# Patient Record
Sex: FEMALE | Race: WHITE | Employment: OTHER | URBAN - METROPOLITAN AREA
[De-identification: names, ages, dates, MRNs, and addresses within clinical notes are randomized per-mention and may not be internally consistent; named-entity substitution may affect disease eponyms.]

---

## 2017-04-10 ENCOUNTER — HOSPITAL ENCOUNTER (OUTPATIENT)
Dept: MAMMOGRAPHY | Age: 72
Discharge: HOME | End: 2017-04-10
Attending: INTERNAL MEDICINE
Payer: MEDICARE

## 2017-04-10 VITALS — SYSTOLIC BLOOD PRESSURE: 143 MMHG | DIASTOLIC BLOOD PRESSURE: 76 MMHG | TEMPERATURE: 96.2 F | HEART RATE: 57 BPM

## 2017-04-10 DIAGNOSIS — N63.20 LEFT BREAST MASS: ICD-10-CM

## 2017-04-10 DIAGNOSIS — N63.20 BREAST MASS, LEFT: ICD-10-CM

## 2017-04-10 PROCEDURE — 77065 DX MAMMO INCL CAD UNI: CPT

## 2017-04-10 PROCEDURE — 19083 BX BREAST 1ST LESION US IMAG: CPT

## 2017-04-10 PROCEDURE — 88361 TUMOR IMMUNOHISTOCHEM/COMPUT: CPT

## 2017-04-10 PROCEDURE — 73060999999 HC MISC LAB CHARGE

## 2017-04-10 PROCEDURE — 88305 TISSUE EXAM BY PATHOLOGIST: CPT | Performed by: INTERNAL MEDICINE

## 2017-04-10 PROCEDURE — 74011000250 HC RX REV CODE- 250: Performed by: INTERNAL MEDICINE

## 2017-04-10 RX ORDER — LIDOCAINE HYDROCHLORIDE 10 MG/ML
5 INJECTION INFILTRATION; PERINEURAL
Status: COMPLETED | OUTPATIENT
Start: 2017-04-10 | End: 2017-04-10

## 2017-04-10 RX ADMIN — LIDOCAINE HYDROCHLORIDE 5 ML: 10 INJECTION, SOLUTION INFILTRATION; PERINEURAL at 10:43

## 2017-04-12 NOTE — PROGRESS NOTES
Dr Krishna Craft and I spoke with Ms Barbara Barba and her  regarding the results of her Lt breast U/S bx. 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 They did very well with results, had few questions and she had no post bx issues or complaints. I prayed with them and gave them an education packet.  MRI is 4/19 DT @ 10:15 then meet with [facility] 8/08 @ 9:15

## 2017-04-19 ENCOUNTER — HOSPITAL ENCOUNTER (OUTPATIENT)
Dept: MRI IMAGING | Age: 72
Discharge: HOME | End: 2017-04-19
Attending: INTERNAL MEDICINE
Payer: MEDICARE

## 2017-04-19 DIAGNOSIS — C50.912 BREAST CANCER, LEFT BREAST (HCC): ICD-10-CM

## 2017-04-19 LAB — CREAT BLD-MCNC: 1 MG/DL (ref 0.6–1)

## 2017-04-19 PROCEDURE — 74011250636 HC RX REV CODE- 250/636: Performed by: INTERNAL MEDICINE

## 2017-04-19 PROCEDURE — A9577 INJ MULTIHANCE: HCPCS | Performed by: INTERNAL MEDICINE

## 2017-04-19 PROCEDURE — 74011000258 HC RX REV CODE- 258: Performed by: INTERNAL MEDICINE

## 2017-04-19 PROCEDURE — 77059 MRI BREAST BI W WO CONT: CPT

## 2017-04-19 PROCEDURE — 82565 ASSAY OF CREATININE: CPT

## 2017-04-19 RX ORDER — SODIUM CHLORIDE 0.9 % (FLUSH) 0.9 %
10 SYRINGE (ML) INJECTION
Status: COMPLETED | OUTPATIENT
Start: 2017-04-19 | End: 2017-04-19

## 2017-04-19 RX ADMIN — GADOBENATE DIMEGLUMINE 19 ML: 529 INJECTION, SOLUTION INTRAVENOUS at 11:29

## 2017-04-19 RX ADMIN — Medication 10 ML: at 11:29

## 2017-04-19 RX ADMIN — SODIUM CHLORIDE 100 ML: 900 INJECTION, SOLUTION INTRAVENOUS at 11:29

## 2017-04-25 PROBLEM — C50.912 BREAST CANCER, LEFT BREAST (HCC): Status: ACTIVE | Noted: 2017-04-25

## 2017-04-25 RX ORDER — CEFAZOLIN SODIUM IN 0.9 % NACL 2 G/50 ML
2 INTRAVENOUS SOLUTION, PIGGYBACK (ML) INTRAVENOUS ONCE
Status: CANCELLED | OUTPATIENT
Start: 2017-05-05 | End: 2017-05-05

## 2017-04-26 ENCOUNTER — PATIENT OUTREACH (OUTPATIENT)
Dept: CASE MANAGEMENT | Age: 72
End: 2017-04-26

## 2017-04-26 NOTE — ACP (ADVANCE CARE PLANNING)
New patient visit with Dr. Eriberto Ward. Patient wants to have lumpectomy and discussed wire localization lumpectomy with sentinel  lymph node biopsy. Once surgery is completed the patient will  return for path report about one week after surgery and then will be referred to medical oncology. Surgery will be at the [address] and it is outpatient.   To return post op.

## 2017-05-01 ENCOUNTER — HOSPITAL ENCOUNTER (OUTPATIENT)
Dept: SURGERY | Age: 72
Discharge: HOME | End: 2017-05-01
Attending: SURGERY

## 2017-05-01 VITALS
WEIGHT: 190 LBS | HEIGHT: 64 IN | RESPIRATION RATE: 16 BRPM | OXYGEN SATURATION: 100 % | BODY MASS INDEX: 32.44 KG/M2 | SYSTOLIC BLOOD PRESSURE: 134 MMHG | DIASTOLIC BLOOD PRESSURE: 83 MMHG | HEART RATE: 66 BPM

## 2017-05-01 NOTE — PERIOP NOTES
Patient verified name, , and surgery as listed in [facility]. Type 1b surgery, Walk in assessment complete. Labs per surgeon: none  Labs per anesthesia protocol: none  EKG: not needed per protocols. Hibiclens and instructions given per hospital policy. Patient provided with handouts including Guide to Surgery, Pain Management, Hand Hygiene, Blood Transfusion Education, and [facility] Brochure. Patient answered medical/surgical history questions at their best of ability. All prior to admission medications documented in [facility]. Original medication prescription bottle were visualized during patient appointment. Patient instructed to hold all vitamins 7 days prior to surgery and NSAIDS 5 days prior to surgery, patient verbalized understanding. Medications to be held - none    Patient instructed to continue previous medications as prescribed prior to surgery and to take the following medications the day of surgery according to anesthesia guidelines with a small sip of water: tenoretic, zyrtec prn, ativan prn, effexor. Patient taught back and verbalized understanding.

## 2017-05-04 ENCOUNTER — ANESTHESIA EVENT (OUTPATIENT)
Dept: SURGERY | Age: 72
End: 2017-05-04
Payer: MEDICARE

## 2017-05-05 ENCOUNTER — APPOINTMENT (OUTPATIENT)
Dept: NUCLEAR MEDICINE | Age: 72
End: 2017-05-05
Attending: SURGERY
Payer: MEDICARE

## 2017-05-05 ENCOUNTER — APPOINTMENT (OUTPATIENT)
Dept: MAMMOGRAPHY | Age: 72
End: 2017-05-05
Attending: SURGERY
Payer: MEDICARE

## 2017-05-05 ENCOUNTER — ANESTHESIA (OUTPATIENT)
Dept: SURGERY | Age: 72
End: 2017-05-05
Payer: MEDICARE

## 2017-05-05 ENCOUNTER — HOSPITAL ENCOUNTER (OUTPATIENT)
Age: 72
Setting detail: OUTPATIENT SURGERY
Discharge: HOME | End: 2017-05-05
Attending: SURGERY | Admitting: SURGERY
Payer: MEDICARE

## 2017-05-05 VITALS
WEIGHT: 190 LBS | HEIGHT: 64 IN | TEMPERATURE: 97.9 F | BODY MASS INDEX: 32.44 KG/M2 | DIASTOLIC BLOOD PRESSURE: 70 MMHG | OXYGEN SATURATION: 95 % | SYSTOLIC BLOOD PRESSURE: 152 MMHG | HEART RATE: 73 BPM | RESPIRATION RATE: 14 BRPM

## 2017-05-05 DIAGNOSIS — C50.919 BREAST CANCER (HCC): ICD-10-CM

## 2017-05-05 DIAGNOSIS — C50.912 CARCINOMA OF LEFT BREAST (HCC): ICD-10-CM

## 2017-05-05 DIAGNOSIS — N63.20 MASS OF LEFT BREAST: ICD-10-CM

## 2017-05-05 PROCEDURE — 76210000016 HC OR PH I REC 1 TO 1.5 HR: Performed by: SURGERY

## 2017-05-05 PROCEDURE — 77030018836 HC SOL IRR NACL ICUM -A: Performed by: SURGERY

## 2017-05-05 PROCEDURE — 77030003460 US PLC NDL/WIRE/CLIP/SEED BREAST LT

## 2017-05-05 PROCEDURE — 76010000161 HC OR TIME 1 TO 1.5 HR INTENSV-TIER 1: Performed by: SURGERY

## 2017-05-05 PROCEDURE — 77030032490 HC SLV COMPR SCD KNE COVD -B: Performed by: SURGERY

## 2017-05-05 PROCEDURE — 74011250636 HC RX REV CODE- 250/636: Performed by: ANESTHESIOLOGY

## 2017-05-05 PROCEDURE — A9541 TC99M SULFUR COLLOID: HCPCS

## 2017-05-05 PROCEDURE — 77030010938 HC CLP LIG TELE -A: Performed by: SURGERY

## 2017-05-05 PROCEDURE — 77030003028 HC SUT VCRL J&J -A: Performed by: SURGERY

## 2017-05-05 PROCEDURE — 74011000250 HC RX REV CODE- 250: Performed by: ANESTHESIOLOGY

## 2017-05-05 PROCEDURE — 74011250636 HC RX REV CODE- 250/636

## 2017-05-05 PROCEDURE — 77030002933 HC SUT MCRYL J&J -A: Performed by: SURGERY

## 2017-05-05 PROCEDURE — 77030031139 HC SUT VCRL2 J&J -A: Performed by: SURGERY

## 2017-05-05 PROCEDURE — 77030020782 HC GWN BAIR PAWS FLX 3M -B: Performed by: ANESTHESIOLOGY

## 2017-05-05 PROCEDURE — 77030020143 HC AIRWY LARYN INTUB CGAS -A: Performed by: ANESTHESIOLOGY

## 2017-05-05 PROCEDURE — 88305 TISSUE EXAM BY PATHOLOGIST: CPT | Performed by: SURGERY

## 2017-05-05 PROCEDURE — 74011250636 HC RX REV CODE- 250/636: Performed by: SURGERY

## 2017-05-05 PROCEDURE — 76210000020 HC REC RM PH II FIRST 0.5 HR: Performed by: SURGERY

## 2017-05-05 PROCEDURE — 76060000033 HC ANESTHESIA 1 TO 1.5 HR: Performed by: SURGERY

## 2017-05-05 PROCEDURE — 77065 DX MAMMO INCL CAD UNI: CPT

## 2017-05-05 PROCEDURE — 74011000250 HC RX REV CODE- 250: Performed by: SURGERY

## 2017-05-05 PROCEDURE — 77030002996 HC SUT SLK J&J -A: Performed by: SURGERY

## 2017-05-05 PROCEDURE — 74011000250 HC RX REV CODE- 250

## 2017-05-05 PROCEDURE — 77030011640 HC PAD GRND REM COVD -A: Performed by: SURGERY

## 2017-05-05 RX ORDER — MIDAZOLAM HYDROCHLORIDE 1 MG/ML
2 INJECTION, SOLUTION INTRAMUSCULAR; INTRAVENOUS
Status: DISCONTINUED | OUTPATIENT
Start: 2017-05-05 | End: 2017-05-05 | Stop reason: HOSPADM

## 2017-05-05 RX ORDER — OXYCODONE HYDROCHLORIDE 5 MG/1
10 TABLET ORAL
Status: DISCONTINUED | OUTPATIENT
Start: 2017-05-05 | End: 2017-05-05 | Stop reason: HOSPADM

## 2017-05-05 RX ORDER — ONDANSETRON 2 MG/ML
4 INJECTION INTRAMUSCULAR; INTRAVENOUS ONCE
Status: DISCONTINUED | OUTPATIENT
Start: 2017-05-05 | End: 2017-05-05 | Stop reason: HOSPADM

## 2017-05-05 RX ORDER — LIDOCAINE HYDROCHLORIDE 10 MG/ML
5 INJECTION INFILTRATION; PERINEURAL
Status: COMPLETED | OUTPATIENT
Start: 2017-05-05 | End: 2017-05-05

## 2017-05-05 RX ORDER — HYDROMORPHONE HYDROCHLORIDE 2 MG/ML
0.5 INJECTION, SOLUTION INTRAMUSCULAR; INTRAVENOUS; SUBCUTANEOUS
Status: DISCONTINUED | OUTPATIENT
Start: 2017-05-05 | End: 2017-05-05 | Stop reason: HOSPADM

## 2017-05-05 RX ORDER — PROMETHAZINE HYDROCHLORIDE 25 MG/1
25 TABLET ORAL
Qty: 20 TAB | Refills: 0 | Status: SHIPPED | OUTPATIENT
Start: 2017-05-05 | End: 2017-09-14 | Stop reason: ALTCHOICE

## 2017-05-05 RX ORDER — PROPOFOL 10 MG/ML
INJECTION, EMULSION INTRAVENOUS AS NEEDED
Status: DISCONTINUED | OUTPATIENT
Start: 2017-05-05 | End: 2017-05-05 | Stop reason: HOSPADM

## 2017-05-05 RX ORDER — LIDOCAINE HYDROCHLORIDE 10 MG/ML
0.1 INJECTION INFILTRATION; PERINEURAL AS NEEDED
Status: DISCONTINUED | OUTPATIENT
Start: 2017-05-05 | End: 2017-05-05 | Stop reason: HOSPADM

## 2017-05-05 RX ORDER — FENTANYL CITRATE 50 UG/ML
100 INJECTION, SOLUTION INTRAMUSCULAR; INTRAVENOUS ONCE
Status: DISCONTINUED | OUTPATIENT
Start: 2017-05-05 | End: 2017-05-05 | Stop reason: HOSPADM

## 2017-05-05 RX ORDER — LIDOCAINE HYDROCHLORIDE 20 MG/ML
INJECTION, SOLUTION EPIDURAL; INFILTRATION; INTRACAUDAL; PERINEURAL AS NEEDED
Status: DISCONTINUED | OUTPATIENT
Start: 2017-05-05 | End: 2017-05-05 | Stop reason: HOSPADM

## 2017-05-05 RX ORDER — ALBUTEROL SULFATE 0.83 MG/ML
2.5 SOLUTION RESPIRATORY (INHALATION) AS NEEDED
Status: DISCONTINUED | OUTPATIENT
Start: 2017-05-05 | End: 2017-05-05 | Stop reason: HOSPADM

## 2017-05-05 RX ORDER — MIDAZOLAM HYDROCHLORIDE 1 MG/ML
2 INJECTION, SOLUTION INTRAMUSCULAR; INTRAVENOUS ONCE
Status: DISCONTINUED | OUTPATIENT
Start: 2017-05-05 | End: 2017-05-05 | Stop reason: HOSPADM

## 2017-05-05 RX ORDER — NALOXONE HYDROCHLORIDE 0.4 MG/ML
0.1 INJECTION, SOLUTION INTRAMUSCULAR; INTRAVENOUS; SUBCUTANEOUS AS NEEDED
Status: DISCONTINUED | OUTPATIENT
Start: 2017-05-05 | End: 2017-05-05 | Stop reason: HOSPADM

## 2017-05-05 RX ORDER — SODIUM CHLORIDE, SODIUM LACTATE, POTASSIUM CHLORIDE, CALCIUM CHLORIDE 600; 310; 30; 20 MG/100ML; MG/100ML; MG/100ML; MG/100ML
100 INJECTION, SOLUTION INTRAVENOUS CONTINUOUS
Status: DISCONTINUED | OUTPATIENT
Start: 2017-05-05 | End: 2017-05-05 | Stop reason: HOSPADM

## 2017-05-05 RX ORDER — DEXAMETHASONE SODIUM PHOSPHATE 4 MG/ML
INJECTION, SOLUTION INTRA-ARTICULAR; INTRALESIONAL; INTRAMUSCULAR; INTRAVENOUS; SOFT TISSUE AS NEEDED
Status: DISCONTINUED | OUTPATIENT
Start: 2017-05-05 | End: 2017-05-05 | Stop reason: HOSPADM

## 2017-05-05 RX ORDER — CEFAZOLIN SODIUM IN 0.9 % NACL 2 G/50 ML
2 INTRAVENOUS SOLUTION, PIGGYBACK (ML) INTRAVENOUS ONCE
Status: COMPLETED | OUTPATIENT
Start: 2017-05-05 | End: 2017-05-05

## 2017-05-05 RX ORDER — HYDROCODONE BITARTRATE AND ACETAMINOPHEN 7.5; 325 MG/1; MG/1
1 TABLET ORAL
Qty: 30 TAB | Refills: 0 | Status: SHIPPED | OUTPATIENT
Start: 2017-05-05 | End: 2017-07-03 | Stop reason: ALTCHOICE

## 2017-05-05 RX ORDER — DIPHENHYDRAMINE HYDROCHLORIDE 50 MG/ML
12.5 INJECTION, SOLUTION INTRAMUSCULAR; INTRAVENOUS
Status: DISCONTINUED | OUTPATIENT
Start: 2017-05-05 | End: 2017-05-05 | Stop reason: HOSPADM

## 2017-05-05 RX ORDER — ONDANSETRON 2 MG/ML
INJECTION INTRAMUSCULAR; INTRAVENOUS AS NEEDED
Status: DISCONTINUED | OUTPATIENT
Start: 2017-05-05 | End: 2017-05-05 | Stop reason: HOSPADM

## 2017-05-05 RX ORDER — OXYCODONE HYDROCHLORIDE 5 MG/1
5 TABLET ORAL
Status: DISCONTINUED | OUTPATIENT
Start: 2017-05-05 | End: 2017-05-05 | Stop reason: HOSPADM

## 2017-05-05 RX ORDER — FENTANYL CITRATE 50 UG/ML
INJECTION, SOLUTION INTRAMUSCULAR; INTRAVENOUS AS NEEDED
Status: DISCONTINUED | OUTPATIENT
Start: 2017-05-05 | End: 2017-05-05 | Stop reason: HOSPADM

## 2017-05-05 RX ORDER — LIDOCAINE HYDROCHLORIDE 10 MG/ML
INJECTION INFILTRATION; PERINEURAL AS NEEDED
Status: DISCONTINUED | OUTPATIENT
Start: 2017-05-05 | End: 2017-05-05 | Stop reason: HOSPADM

## 2017-05-05 RX ADMIN — FENTANYL CITRATE 100 MCG: 50 INJECTION, SOLUTION INTRAMUSCULAR; INTRAVENOUS at 15:29

## 2017-05-05 RX ADMIN — SODIUM CHLORIDE, SODIUM LACTATE, POTASSIUM CHLORIDE, AND CALCIUM CHLORIDE 100 ML/HR: 600; 310; 30; 20 INJECTION, SOLUTION INTRAVENOUS at 08:50

## 2017-05-05 RX ADMIN — LIDOCAINE HYDROCHLORIDE 5 ML: 10 INJECTION, SOLUTION INFILTRATION; PERINEURAL at 11:44

## 2017-05-05 RX ADMIN — ONDANSETRON 4 MG: 2 INJECTION INTRAMUSCULAR; INTRAVENOUS at 15:41

## 2017-05-05 RX ADMIN — PROPOFOL 170 MG: 10 INJECTION, EMULSION INTRAVENOUS at 15:31

## 2017-05-05 RX ADMIN — CEFAZOLIN 2 G: 1 INJECTION, POWDER, FOR SOLUTION INTRAMUSCULAR; INTRAVENOUS; PARENTERAL at 15:26

## 2017-05-05 RX ADMIN — SODIUM CHLORIDE, SODIUM LACTATE, POTASSIUM CHLORIDE, AND CALCIUM CHLORIDE: 600; 310; 30; 20 INJECTION, SOLUTION INTRAVENOUS at 15:49

## 2017-05-05 RX ADMIN — DEXAMETHASONE SODIUM PHOSPHATE 10 MG: 4 INJECTION, SOLUTION INTRA-ARTICULAR; INTRALESIONAL; INTRAMUSCULAR; INTRAVENOUS; SOFT TISSUE at 15:41

## 2017-05-05 RX ADMIN — LIDOCAINE HYDROCHLORIDE 100 MG: 20 INJECTION, SOLUTION EPIDURAL; INFILTRATION; INTRACAUDAL; PERINEURAL at 15:31

## 2017-05-05 RX ADMIN — LIDOCAINE HYDROCHLORIDE 0.1 ML: 10 INJECTION, SOLUTION INFILTRATION; PERINEURAL at 08:50

## 2017-05-05 NOTE — BRIEF OP NOTE
BRIEF OPERATIVE NOTE    Date of Procedure: 5/5/2017   Preoperative Diagnosis: Malignant neoplasm of left female breast, unspecified site of breast ([facility] [C50.912]  Postoperative Diagnosis: Malignant neoplasm of left female breast, unspecified site of breast ([facility] [C50.912]    Procedure(s):  LEFT BREAST NEEDLE LOCALIZED LUMPECTOMY  LEFT SENTINEL NODE BIOPSY WITH LYMPHATIC MAPPING   Surgeon(s) and Role:     * Shayla Woods MD - Primary         Assistant Staff:       Surgical Staff:  Circ-1: Ian Tee RN  Scrub Tech-1: Silvina Marin  Scrub Tech-2: Jaki Salas  Event Time In   Incision Start 1546   Incision Close 1632     Anesthesia: General   Estimated Blood Loss: 20ml  Specimens:   ID Type Source Tests Collected by Time Destination   1 : LEFT BREAST TISSUE Needle Loc   Shayla Woods MD 9/3/6582 5456 Pathology   2 : LEFT SENTINEL NODE #1 AND #2 Preservative   Shayla Woods MD 4/4/5193 1225 Pathology   3 : LEFT SENTINEL NODE #3 Preservative   Shayla Woods MD 3/7/6290 4676 Pathology      Findings: as above    Complications: none  Implants: * No implants in log *

## 2017-05-05 NOTE — DISCHARGE INSTRUCTIONS
May shower on Sunday. Breast Biopsy: What to Expect at Home    Your Recovery  After a sentinel node biopsy, many women have no side effects. Some women have pain or bruising at the cut (incision) and feel tired. Your breast and underarm area may be slightly swollen. This may last a few days. You should feel close to normal in a few days. The incision the doctor made usually heals in about 2 weeks. The scar usually fades with time. Some women have a buildup of fluid in the area where the lymph nodes were removed. This is known as seroma. This goes away on its own, or your doctor can drain it. Your doctor injected blue dye and radioactive material into your breast. The blue dye may give your breast a bluish color and turn your urine green for about 24 hours. The radioactive material leaves the body on its own in 24 to 48 hours. A sentinel node biopsy may be done at the same time as other breast surgeries. If this is the case, how you recover will be different. This care sheet gives you a general idea about how long it will take for you to recover. But each person recovers at a different pace. Follow the steps below to get better as quickly as possible. How can you care for yourself at home? Activity  · Rest when you feel tired. Getting enough sleep will help you recover. · Try to walk each day. Start by walking a little more than you did the day before. Bit by bit, increase the amount you walk. Walking boosts blood flow and helps prevent pneumonia and constipation. · You may drive when you are no longer taking pain medicine and you feel up to it. · You can lift things when you feel comfortable doing so. · Most women return to work and their normal routines in 2 to 7 days. · You may shower 24 to 48 hours after surgery, if your doctor okays it. Pat the incision dry. Do not take a bath for the first 2 weeks, or until your doctor tells you it is okay.   · Avoid activity or exercise that may put stress on the cut. This includes washing windows, vacuuming, or gardening with the affected arm. Diet  · You can eat your normal diet. If your stomach is upset, try bland, low-fat foods like plain rice, broiled chicken, toast, and yogurt. · You may notice that your bowels are not regular right after your surgery. This is common. Try to avoid constipation and straining with bowel movements. Take a fiber supplement such as Citrucel or Metamucil every day. If you have not had a bowel movement after a couple of days, take a mild laxative. Medicines  · Take pain medicines exactly as directed. ¨ If the doctor gave you a prescription medicine for pain, take it as prescribed. ¨ If you are not taking a prescription pain medicine, take an over-the-counter medicine such as acetaminophen (Tylenol), ibuprofen (Advil, Motrin), or naproxen (Aleve). Read and follow all instructions on the label. ¨ Do not take two or more pain medicines at the same time unless the doctor told you to. Many pain medicines have acetaminophen, which is Tylenol. Too much acetaminophen (Tylenol) can be harmful. · If your doctor prescribed antibiotics, take them as directed. Do not stop taking them just because you feel better. You need to take the full course of antibiotics. · If you think your pain medicine is making you sick to your stomach:  ¨ Take your medicine after meals (unless your doctor has told you not to). ¨ Ask your doctor for a different pain medicine. Incision care  · If you have strips of tape on the cut (incision) the doctor made, leave the tape on for about 1 week or until it falls off. · After you can shower, wash the area daily with warm, soapy water and pat it dry. Follow-up care is a key part of your treatment and safety. Be sure to make and go to all appointments, and call your doctor if you are having problems. Its also a good idea to know your test results and keep a list of the medicines you take.   When should you call for help? Call 911 anytime you think you may need emergency care. For example, call if:  · You passed out (lost consciousness). · You have severe trouble breathing. · You have sudden chest pain and shortness of breath, or you cough up blood. Call your doctor now or seek immediate medical care if:  · You have signs of lymphedema. ¨ You have a feeling of tightness or swelling in or around your arm. ¨ You have pain, weakness that keeps getting worse, or a tingling \"pins and needles\" feeling. ¨ Your arm feels full or heavy. ¨ You notice that your hand or wrist is becoming stiff and hard to move. ¨ You notice swelling in your fingers. · You have increased swelling in the breast.  · You have signs of infection, such as:  ¨ Increased pain, swelling, warmth, or redness. ¨ Red streaks leading from the incision. ¨ Pus draining from the incision. ¨ Swollen lymph nodes in your neck, armpits, or groin. ¨ A fever. · You have loose stitches, or your incision comes open. · You need to change your dressing 3 times because of bleeding. Watch closely for changes in your health, and be sure to contact your doctor if:  · Your rings, watches, or bracelets feel tight, but you have not gained weight. · You do not have a bowel movement after taking a laxative. Where can you learn more? Go to [employer]  Enter H004 in the search box to learn more about \"[place] Node Biopsy for Breast Cancer: What to Expect at Home. \"   © 5590-4558 [employer]wise, [employer]orporated. Care instructions adapted under license by [facility] (which disclaims liability or warranty for this information). This care instruction is for use with your licensed healthcare professional. If you have questions about a medical condition or this instruction, always ask your healthcare professional. [place] [address] any warranty or liability for your use of this information. Content Version: 9.4.03367;  Last Revised: November 30, 2010

## 2017-05-05 NOTE — ANESTHESIA PREPROCEDURE EVALUATION
Anesthetic History   No history of anesthetic complications            Review of Systems / Medical History  Patient summary reviewed and pertinent labs reviewed    Pulmonary  Within defined limits                 Neuro/Psych   Within defined limits           Cardiovascular    Hypertension: well controlled              Exercise tolerance: >4 METS     GI/Hepatic/Renal  Within defined limits              Endo/Other        Obesity and cancer (breast)     Other Findings              Physical Exam    Airway  Mallampati: III  TM Distance: > 6 cm  Neck ROM: normal range of motion   Mouth opening: Normal     Cardiovascular  Regular rate and rhythm,  S1 and S2 normal,  no murmur, click, rub, or gallop  Rhythm: regular  Rate: normal         Dental  No notable dental hx       Pulmonary  Breath sounds clear to auscultation               Abdominal  GI exam deferred       Other Findings            Anesthetic Plan    ASA: 2  Anesthesia type: general          Induction: Intravenous  Anesthetic plan and risks discussed with: Patient and Family

## 2017-05-05 NOTE — ANESTHESIA POSTPROCEDURE EVALUATION
Post-Anesthesia Evaluation and Assessment    Patient: Tiffanie Guerra MRN: [id number]  SSN: [id number]    YOB: 1945  Age: 71 y.o. Sex: female       Cardiovascular Function/Vital Signs  Visit Vitals    /70 (BP 1 Location: Left arm, BP Patient Position: At rest)    Pulse 73    Temp 36.6 °C (97.9 °F)    Resp 14    Ht 5' 4\" (1.626 m)    Wt 86.2 kg (190 lb)    SpO2 95%    BMI 32.61 kg/m2       Patient is status post general anesthesia for Procedure(s):  LEFT BREAST NEEDLE LOCALIZED LUMPECTOMY  LEFT SENTINEL NODE BIOPSY WITH LYMPHATIC MAPPING . Nausea/Vomiting: None    Postoperative hydration reviewed and adequate. Pain:  Pain Scale 1: Numeric (0 - 10) (05/05/17 1726)  Pain Intensity 1: 0 (05/05/17 1726)   Managed    Neurological Status:   Neuro (WDL): Within Defined Limits (05/05/17 1726)  Neuro  Neurologic State: Alert;Eyes open spontaneously (05/05/17 1726)  Cognition: Follows commands (05/05/17 1726)  LUE Motor Response: Purposeful (05/05/17 1726)  LLE Motor Response: Purposeful (05/05/17 1726)  RUE Motor Response: Purposeful (05/05/17 1726)  RLE Motor Response: Purposeful (05/05/17 1726)   At baseline    Mental Status and Level of Consciousness: Arousable    Pulmonary Status:   O2 Device: Room air (05/05/17 1741)   Adequate oxygenation and airway patent    Complications related to anesthesia: None    Post-anesthesia assessment completed.  No concerns    Signed By: Janelle Gavin MD     May 5, 2017

## 2017-05-05 NOTE — IP AVS SNAPSHOT
[address] [place]  
[phone] Patient: Ty Hope MRN: [id number] NRA:[id number] You are allergic to the following Allergen Reactions Penicillins Other (comments)  
 unknown Recent Documentation Height Weight BMI OB Status Smoking Status 1.626 m 86.2 kg 32.61 kg/m2 Postmenopausal Never Smoker Emergency Contacts Name Discharge Info Relation Home Work Mobile [address] CAREGIVER [3] Spouse [3] [phone] About your hospitalization You were admitted on: May 5, 2017 You last received care in the:  [facility] PACU You were discharged on: May 5, 2017 Unit phone number:  [phone] Why you were hospitalized Your primary diagnosis was:  Not on File Providers Seen During Your Hospitalizations Provider Role Specialty Primary office phone Ana Dhillon MD Attending Provider General Surgery [phone] Your Primary Care Physician (PCP) Primary Care Physician Office Phone Office Fax [address] [place] [address] [phone] Follow-up Information Follow up With Details Comments Contact Info Kati Jerome MD   [id number] [facility] [address] [phone] Ana Dhillon MD Schedule an appointment as soon as possible for a visit in 10 day(s)  Sadia Reed [facility] [address] [facility] [address] [phone] Current Discharge Medication List  
  
START taking these medications Dose & Instructions Dispensing Information Comments Morning Noon Evening Bedtime HYDROcodone-acetaminophen 7.5-325 mg per tablet Commonly known as:  [place] Littler Your last dose was: Your next dose is:    
   
   
 Dose:  1 Tab Take 1 Tab by mouth every six (6) hours as needed for Pain. Max Daily Amount: 4 Tabs. Quantity:  30 Tab Refills:  0 promethazine 25 mg tablet Commonly known as:  PHENERGAN Your last dose was: Your next dose is:    
   
   
 Dose:  25 mg Take 1 Tab by mouth every six (6) hours as needed for Nausea. Quantity:  20 Tab Refills:  0 CONTINUE these medications which have CHANGED Dose & Instructions Dispensing Information Comments Morning Noon Evening Bedtime  
 atenolol-chlorthalidone 50-25 mg per tablet Commonly known as:  B[place] Mercedes What changed:  See the new instructions. Your last dose was: Your next dose is: TAKE 1 TABLET BY MOUTH EVERY DAY Quantity:  90 Tab Refills:  3 CONTINUE these medications which have NOT CHANGED Dose & Instructions Dispensing Information Comments Morning Noon Evening Bedtime  
 cetirizine 10 mg tablet Commonly known as:  ZYRTEC Your last dose was: Your next dose is:    
   
   
 Dose:  10 mg Take 1 Tab by mouth daily as needed for Allergies. Indications: ALLERGIC RHINITIS Quantity:  30 Tab Refills:  2 EFFEXOR  mg capsule Generic drug:  venlafaxine-SR Your last dose was: Your next dose is: Take  by mouth daily. Take / use AM day of surgery  per anesthesia protocols. Refills:  0 LORazepam 1 mg tablet Commonly known as:  ATIVAN Your last dose was: Your next dose is:    
   
   
 Dose:  1 Tab Take 1 Tab by mouth every eight (8) hours as needed. Take / use AM day of surgery  per anesthesia protocols if needed Refills:  4 MULTIVITAMIN PO Your last dose was: Your next dose is: Take  by mouth daily. Refills:  0 PREMARIN 0.625 mg/gram vaginal cream  
Generic drug:  conjugated estrogens Your last dose was:     
   
Your next dose is:    
   
   
 Dose:  0.5 g  
 Insert 0.5 g into vagina daily. Refills:  0 RESTORIL 30 mg capsule Generic drug:  temazepam  
   
Your last dose was: Your next dose is: Take  by mouth nightly as needed for Sleep. Refills:  0  
     
   
   
   
  
 VITAMIN B COMPLEX PO Your last dose was: Your next dose is: Take  by mouth. Refills:  0 Where to Get Your Medications Information on where to get these meds will be given to you by the nurse or doctor. ! Ask your nurse or doctor about these medications HYDROcodone-acetaminophen 7.5-325 mg per tablet  
 promethazine 25 mg tablet Discharge Instructions May shower on Sunday. Breast Biopsy: What to Expect at [facility] Your Recovery After a sentinel node biopsy, many women have no side effects. Some women have pain or bruising at the cut (incision) and feel tired. Your breast and underarm area may be slightly swollen. This may last a few days. You should feel close to normal in a few days. The incision the doctor made usually heals in about 2 weeks. The scar usually fades with time. Some women have a buildup of fluid in the area where the lymph nodes were removed. This is known as seroma. This goes away on its own, or your doctor can drain it. Your doctor injected blue dye and radioactive material into your breast. The blue dye may give your breast a bluish color and turn your urine green for about 24 hours. The radioactive material leaves the body on its own in 24 to 48 hours. A sentinel node biopsy may be done at the same time as other breast surgeries. If this is the case, how you recover will be different. This care sheet gives you a general idea about how long it will take for you to recover. But each person recovers at a different pace. Follow the steps below to get better as quickly as possible. How can you care for yourself at home? Activity · Rest when you feel tired. Getting enough sleep will help you recover. · Try to walk each day. Start by walking a little more than you did the day before. Bit by bit, increase the amount you walk. Walking boosts blood flow and helps prevent pneumonia and constipation. · You may drive when you are no longer taking pain medicine and you feel up to it. · You can lift things when you feel comfortable doing so. · Most women return to work and their normal routines in 2 to 7 days. · You may shower 24 to 48 hours after surgery, if your doctor okays it. Pat the incision dry. Do not take a bath for the first 2 weeks, or until your doctor tells you it is okay. · Avoid activity or exercise that may put stress on the cut. This includes washing windows, vacuuming, or gardening with the affected arm. Diet · You can eat your normal diet. If your stomach is upset, try bland, low-fat foods like plain rice, broiled chicken, toast, and yogurt. · You may notice that your bowels are not regular right after your surgery. This is common. Try to avoid constipation and straining with bowel movements. Take a fiber supplement such as Citrucel or Metamucil every day. If you have not had a bowel movement after a couple of days, take a mild laxative. Medicines · Take pain medicines exactly as directed. ¨ If the doctor gave you a prescription medicine for pain, take it as prescribed. ¨ If you are not taking a prescription pain medicine, take an over-the-counter medicine such as acetaminophen (Tylenol), ibuprofen (Advil, Motrin), or naproxen (Aleve). Read and follow all instructions on the label. ¨ Do not take two or more pain medicines at the same time unless the doctor told you to. Many pain medicines have acetaminophen, which is Tylenol. Too much acetaminophen (Tylenol) can be harmful. · If your doctor prescribed antibiotics, take them as directed.  Do not stop taking them just because you feel better. You need to take the full course of antibiotics. · If you think your pain medicine is making you sick to your stomach: 
¨ Take your medicine after meals (unless your doctor has told you not to). ¨ Ask your doctor for a different pain medicine. Incision care · If you have strips of tape on the cut (incision) the doctor made, leave the tape on for about 1 week or until it falls off. · After you can shower, wash the area daily with warm, soapy water and pat it dry. Follow-up care is a key part of your treatment and safety. Be sure to make and go to all appointments, and call your doctor if you are having problems. Its also a good idea to know your test results and keep a list of the medicines you take. When should you call for help? Call 911 anytime you think you may need emergency care. For example, call if: 
· You passed out (lost consciousness). · You have severe trouble breathing. · You have sudden chest pain and shortness of breath, or you cough up blood. Call your doctor now or seek immediate medical care if: 
· You have signs of lymphedema. ¨ You have a feeling of tightness or swelling in or around your arm. ¨ You have pain, weakness that keeps getting worse, or a tingling \"pins and needles\" feeling. ¨ Your arm feels full or heavy. ¨ You notice that your hand or wrist is becoming stiff and hard to move. ¨ You notice swelling in your fingers. · You have increased swelling in the breast. 
· You have signs of infection, such as: 
¨ Increased pain, swelling, warmth, or redness. ¨ Red streaks leading from the incision. ¨ Pus draining from the incision. ¨ Swollen lymph nodes in your neck, armpits, or groin. ¨ A fever. · You have loose stitches, or your incision comes open. · You need to change your dressing 3 times because of bleeding. Watch closely for changes in your health, and be sure to contact your doctor if: · Your rings, watches, or bracelets feel tight, but you have not gained weight. · You do not have a bowel movement after taking a laxative. Where can you learn more? Go to [employer] Enter 898 [id number] in the search box to learn more about \"[place] Node Biopsy for Breast Cancer: What to Expect at Home. \"  
© 2639-9370 Healthwise, Incorporated. Care instructions adapted under license by Lyndon Awad (which disclaims liability or warranty for this information). This care instruction is for use with your licensed healthcare professional. If you have questions about a medical condition or this instruction, always ask your healthcare professional. [address] any warranty or liability for your use of this information. Content Version: 9.4.50674; Last Revised: November 30, 2010 Discharge Instructions Attachments/References BREAST CANCER: SENTINEL NODE BIOPSY : POST-OP (ENGLISH) LUMPECTOMY : POST-OP (ENGLISH) Discharge Orders None ACO Transitions of Care Introducing [facility] [address] [facility] offers a voluntary care coordination program to provide high quality service and care to [facility] fee-for-service beneficiaries. Phillip Gamboa was designed to help you enhance your health and well-being through the following services: ? Transitions of Care  support for individuals who are transitioning from one care setting to another (example: Hospital to home). ? Chronic and Complex Care Coordination  support for individuals and caregivers of those with serious or chronic illnesses or with more than one chronic (ongoing) condition and those who take a number of different medications. If you meet specific medical criteria, a [facility] [address] may call you directly to coordinate your care with your primary care physician and your other care providers. For questions about the [facility] programs, please, contact your physicians office. For general questions or additional information about Accountable Care Organizations: 
Please visit www.medicare.gov/acos. html or call 1-800-MEDICARE (6-610.453.2696) TTY users should call 8-337.787.4868. Introducing [facility]! Brittany Atul introduces SlapVid patient portal. Now you can access parts of your medical record, email your doctor's office, and request medication refills online. 1. In your internet browser, go to [email or web address] [email or web address] 2. Click on the First Time User? Click Here link in the Sign In box. You will see the New Member Sign Up page. 3. Enter your SlapVid Access Code exactly as it appears below. You will not need to use this code after youve completed the sign-up process. If you do not sign up before the expiration date, you must request a new code. · SlapVid Access Code: [email or web address] Expires: 5/8/2017 10:51 AM 
 
4. Enter the last four digits of your Social Security Number (xxxx) and Date of Birth (mm/dd/yyyy) as indicated and click Submit. You will be taken to the next sign-up page. 5. Create a SlapVid ID. This will be your SlapVid login ID and cannot be changed, so think of one that is secure and easy to remember. 6. Create a SlapVid password. You can change your password at any time. 7. Enter your Password Reset Question and Answer. This can be used at a later time if you forget your password. 8. Enter your e-mail address. You will receive e-mail notification when new information is available in [address] 9. Click Sign Up. You can now view and download portions of your medical record. 10. Click the Download Summary menu link to download a portable copy of your medical information. If you have questions, please visit the Frequently Asked Questions section of the SlapVid website.  Remember, SlapVid is NOT to be used for urgent needs. For medical emergencies, dial 911. Now available from your iPhone and Android! General Information Please provide this summary of care documentation to your next provider. Patient Signature:  ____________________________________________________________ Date:  ____________________________________________________________  
  
Mariya Cons Provider Signature:  ____________________________________________________________ Date:  ____________________________________________________________ More Information [place] Node Biopsy for Breast Cancer: What to Expect at Home Your Recovery After a sentinel node biopsy, many women have no side effects. Some women have pain or bruising at the cut (incision) and feel tired. Your breast and underarm area may be slightly swollen. This may last a few days. You should feel close to normal in a few days. The incision the doctor made usually heals in about 2 weeks. The scar usually fades with time. Some women have a buildup of fluid in the area where the lymph nodes were removed. This is known as seroma. This goes away on its own, or your doctor can drain it. When you had this test, your doctor injected blue dye or radioactive material (or both) into your breast. The blue dye may give your breast a bluish color and turn your urine green for about 24 hours. The radioactive material leaves the body on its own in 24 to 48 hours. A sentinel node biopsy may be done at the same time as other breast surgeries. If this is the case, how you recover will be different. This care sheet gives you a general idea about how long it will take for you to recover. But each person recovers at a different pace. Follow the steps below to get better as quickly as possible. How can you care for yourself at home? Activity · Rest when you feel tired. Getting enough sleep will help you recover. · Try to walk each day. Start by walking a little more than you did the day before. Bit by bit, increase the amount you walk. Walking boosts blood flow and helps prevent pneumonia and constipation. · You may drive when you are no longer taking pain medicine and you feel up to it. · You can lift things when you feel comfortable doing so. · Most women return to work and their normal routines in 2 to 7 days. · You may shower 24 to 48 hours after surgery, if your doctor okays it. Pat the incision dry. Do not take a bath for the first 2 weeks, or until your doctor tells you it is okay. · Avoid activity or exercise that may put stress on the cut. This includes washing windows, vacuuming, or gardening with the affected arm. Diet · You can eat your normal diet. If your stomach is upset, try bland, low-fat foods like plain rice, broiled chicken, toast, and yogurt. · You may notice that your bowels are not regular right after your surgery. This is common. Try to avoid constipation and straining with bowel movements. Take a fiber supplement such as Citrucel or Metamucil every day. If you have not had a bowel movement after a couple of days, take a mild laxative. Medicines · Your doctor will tell you if and when you can restart your medicines. He or she will also give you instructions about taking any new medicines. · If you take blood thinners, such as warfarin (Coumadin), clopidogrel (Plavix), or aspirin, be sure to talk to your doctor. He or she will tell you if and when to start taking those medicines again. Make sure that you understand exactly what your doctor wants you to do. · Take pain medicines exactly as directed. ¨ If the doctor gave you a prescription medicine for pain, take it as prescribed. ¨ If you are not taking a prescription pain medicine, take an over-the-counter medicine such as acetaminophen (Tylenol), ibuprofen (Advil, Motrin), or naproxen (Aleve).  Read and follow all instructions on the label. ¨ Do not take two or more pain medicines at the same time unless the doctor told you to. Many pain medicines have acetaminophen, which is Tylenol. Too much acetaminophen (Tylenol) can be harmful. · If your doctor prescribed antibiotics, take them as directed. Do not stop taking them just because you feel better. You need to take the full course of antibiotics. · If you think your pain medicine is making you sick to your stomach: 
¨ Take your medicine after meals (unless your doctor has told you not to). ¨ Ask your doctor for a different pain medicine. Incision care · If you have strips of tape on the cut (incision) the doctor made, leave the tape on for about 1 week or until it falls off. · After you can shower, wash the area daily with warm, soapy water and pat it dry. Follow-up care is a key part of your treatment and safety. Be sure to make and go to all appointments, and call your doctor if you are having problems. It's also a good idea to know your test results and keep a list of the medicines you take. When should you call for help? Call 911 anytime you think you may need emergency care. For example, call if: 
· You passed out (lost consciousness). · You have severe trouble breathing. · You have sudden chest pain and shortness of breath, or you cough up blood. Call your doctor now or seek immediate medical care if: 
· You have increased swelling in the breast. 
· You have signs of infection, such as: 
¨ Increased pain, swelling, warmth, or redness. ¨ Red streaks leading from the incision. ¨ Pus draining from the incision. ¨ Swollen lymph nodes in your neck, armpits, or groin. ¨ A fever. · You have loose stitches, or your incision comes open. · You need to change your dressing 3 times because of bleeding. Watch closely for changes in your health, and be sure to contact your doctor if: 
· You have signs of lymphedema. ¨ You have a feeling of tightness or swelling in or around your arm. ¨ You have pain, weakness that keeps getting worse, or a tingling \"pins and needles\" feeling. ¨ Your arm feels full or heavy. ¨ You notice that your hand or wrist is becoming stiff and hard to move. ¨ You notice swelling in your fingers. · You do not have a bowel movement after taking a laxative. Where can you learn more? Go to http://bryson-simone.info/. Enter 654 [id number] in the search box to learn more about \"[place] Node Biopsy for Breast Cancer: What to Expect at Home. \" Current as of: August 1, 2016 Content Version: 11.2 © 8861-2007 [employer]. Care instructions adapted under license by [employer] (which disclaims liability or warranty for this information). If you have questions about a medical condition or this instruction, always ask your healthcare professional. [address] any warranty or liability for your use of this information. Lumpectomy: What to Expect at [facility] Your Recovery For 1 or 2 days after the surgery you will probably feel tired and have some pain. The skin around the cut (incision) may feel firm, swollen, and tender, and be bruised. Tenderness should go away in about 2 or 3 days, and the bruising within 2 weeks. Firmness and swelling may last for 3 to 6 months. You may feel a soft lump in your breast that gradually turns hard. This is the incision healing. It is not cancer. You should wear a well-fitted and supportive bra, even during the night, for 1 week. You will probably be able to go back to work or your normal routine in 1 to 3 weeks after the surgery. This may depend on whether you have more treatment. Your doctor may have removed some lymph nodes in your armpit to see if the cancer has spread. If so, you may feel either numbness or tingling (\"pins and needles\") in your armpit or on the inside of your upper arm.  This should improve over the next several weeks. Some women have numbness for a longer time. When you find out that you have cancer, you may feel many emotions and may need some help coping. Seek out family, friends, and counselors for support. You also can do things at home to make yourself feel better while you go through treatment. Call the Arsen Cooney (8-136.380.5474) or visit its website at [address] [employer] for more information. This care sheet gives you a general idea about how long it will take for you to recover. But each person recovers at a different pace. Follow the steps below to get better as quickly as possible. How can you care for yourself at home? Activity · Rest when you feel tired. Getting enough sleep will help you recover. You may want to sleep on the side that has not been operated on. Use a pillow to support the affected breast while lying on your side. · Avoid strenuous activities, such as biking, jogging, weightlifting, or aerobic exercise, for 1 month or until your doctor says it is okay. This may include housework, such as washing windows, especially if you have to use the arm next to the affected breast. 
· Most women can return to their normal activities within 2 weeks. · Try to walk each day. Start out by walking a little more than you did the day before. Bit by bit, increase the amount you walk. Walking boosts blood flow and helps prevent pneumonia and constipation. · For 1 to 2 weeks, avoid lifting anything over 10 to 15 pounds or that would make you strain. This may include heavy grocery bags and milk containers, a heavy briefcase or backpack, cat litter or dog food bags, a vacuum , or a child. · You may drive when you are no longer taking pain medicine and can use your arm without pain. Talk to your doctor about when to start driving, especially if you are having radiation treatments.  
· You will probably be able to go back to work or your normal routine in 1 to 3 weeks. It may be longer, depending on the type of work you do and whether you are having radiation or chemotherapy. · You may shower 24 to 48 hours after surgery, if your doctor okays it. Pat the incision dry. Do not take a bath for the first 2 weeks, or until your doctor tells you it is okay. Diet · You can eat your normal diet. If your stomach is upset, try bland, low-fat foods like plain rice, broiled chicken, toast, and yogurt. · You may notice that your bowel movements are not regular right after your surgery. This is common. Try to avoid constipation and straining with bowel movements. You may want to take a fiber supplement every day. If you have not had a bowel movement after a couple of days, ask your doctor about taking a mild laxative. Medicines · Your doctor will tell you if and when you can restart your medicines. He or she will also give you instructions about taking any new medicines. · If you take blood thinners, such as warfarin (Coumadin), clopidogrel (Plavix), or aspirin, be sure to talk to your doctor. He or she will tell you if and when to start taking those medicines again. Make sure that you understand exactly what your doctor wants you to do. · Take pain medicines exactly as directed. ¨ Your doctor may have given you a medicine to numb the area inside and around your cut (incision). The numbness will last from 6 to 12 hours. If you went home right after the surgery, you may want to take pain medicine before this wears off. ¨ If the doctor gave you a prescription medicine for pain, take it as prescribed. ¨ If you are not taking a prescription pain medicine, ask your doctor if you can take an over-the-counter medicine. · If your doctor prescribed antibiotics, take them as directed. Do not stop taking them just because you feel better. You need to take the full course of antibiotics.  
· If you think your pain medicine is making you sick to your stomach: 
 ¨ Take your medicine after meals (unless your doctor has told you not to). ¨ Ask your doctor for a different pain medicine. Incision care · If you have strips of tape on the cut the doctor made (incision), leave the tape on for a week or until it falls off. · When you can shower, wash the area daily with warm, soapy water and pat it dry. Follow-up care is a key part of your treatment and safety. Be sure to make and go to all appointments, and call your doctor if you are having problems. It's also a good idea to know your test results and keep a list of the medicines you take. When should you call for help? Call 911 anytime you think you may need emergency care. For example, call if: 
· You passed out (lost consciousness). · You have severe trouble breathing. · You have sudden chest pain and shortness of breath, or you cough up blood. Call your doctor now or seek immediate medical care if: 
· You have pain that does not get better when you take your pain medicine. · You have signs of infection, such as: 
¨ Increased pain, swelling, warmth, or redness. ¨ Red streaks leading from the incision. ¨ Pus draining from the incision. ¨ A fever. · You have loose stitches or an open incision. · You have sudden swelling of your arm, hands, or fingers. · Bright red blood has soaked through the bandage over your incision. Watch closely for changes in your health, and be sure to contact your doctor if: 
· You see fluid leaking from either nipple. · Your swelling gets worse. · Your swelling is not going down. · You do not have a bowel movement after taking a laxative. Where can you learn more? Go to http://bryson-simone.info/. Enter D222 in the search box to learn more about \"Lumpectomy: What to Expect at Home. \" Current as of: September 22, 2016 Content Version: 11.2 © 1957-1435 [employer].  Care instructions adapted under license by [address] (which disclaims liability or warranty for this information). If you have questions about a medical condition or this instruction, always ask your healthcare professional. [address] any warranty or liability for your use of this information.

## 2017-05-06 NOTE — OP NOTES
[facility]   OPERATIVE REPORT       Name:  Nikos Grayson   MR#:  [id number]   :  1945   Account #:  [de-identified]   Date of Adm:  2017       DATE OF SURGERY: 2017    PREPROCEDURE DIAGNOSIS: Left breast cancer. POSTOPERATIVE DIAGNOSIS: Left breast cancer. PROCEDURE: Left breast needle localized lumpectomy, left   axillary sentinel lymph node biopsy with lymphatic mapping. SURGEON: Kodi Belle MD    ASSISTANT: None. ANESTHESIA: General anesthetic. ESTIMATED BLOOD LOSS: 20 mL. CONDITION AT COMPLETION: Stable. INDICATIONS: This patient is a 81-year-old white female who was   found to have a left-sided breast cancer on mammogram. The   risks, benefits, and alternatives to surgical options were all   discussed with the patient in the office. The patient understood   the risks of procedure and wished to proceed. Appropriate   consent was given. PROCEDURE: The patient was taken to the operating room where she   underwent general endotracheal anesthetic with no difficulties. The breast and axilla were prepped and draped in a sterile   fashion. Prior to going to the operating room, needle   localization and biopsy clip in the left lateral breast was   performed successfully. The sentinel lymph node was also   identified by lymphoscintigraphy. The wire entered the breast at   approximately 9 o'clock position laterally. A curved incision   was made surrounding the wire with a #15 blade, taking an   ellipse of skin to superficial margin. Dissection around the   wire was then performed with electrocautery. Lumpectomy was   performed in a generous fashion with the biopsy clip and wire at   its center. Once the specimen was removed, it was marked for   orientation with a long suture lateral, short suture superior,   and a double suture to deep margin. Clips were placed, 1   lateral, 2 superior, and 3 deep.  Specimen radiograph was   performed in the room showing removal of the biopsy clip at the   center of the specimen with generous margin. Of note, the deep   margin of the specimen was pectoral fascia, which was taken   along with the specimen. Irrigation was performed and hemostasis   assured. Attention was turned to the axilla. Through the same   skin incision. The NeoProbe was used to localize the sentinel   node in the left axilla. This was carefully dissected out. Three   separate lymph nodes had increased uptake and all were taken as   sentinel nodes and sent as separate specimens. After removal of   these 3 nodes, scanning of the axilla showed no further uptake. Irrigation was again performed of this cavity and hemostasis   assured. The wound was closed in 2 layers with 3-0 Vicryl   interrupted and 4-0 subcuticular Monocryl. Mastisol and Steri-  Strips dressing were applied. The patient was awakened in the   room, extubated, and taken to recovery in good condition.         MD Cristin Rascon / BENI   D:  05/05/2017   17:38   T:  05/05/2017   23:15   Job #:  [id number]

## 2017-05-16 ENCOUNTER — PATIENT OUTREACH (OUTPATIENT)
Dept: CASE MANAGEMENT | Age: 72
End: 2017-05-16

## 2017-06-20 ENCOUNTER — PATIENT OUTREACH (OUTPATIENT)
Dept: CASE MANAGEMENT | Age: 72
End: 2017-06-20

## 2017-06-20 PROBLEM — C50.412 MALIGNANT NEOPLASM OF UPPER-OUTER QUADRANT OF LEFT FEMALE BREAST (HCC): Status: ACTIVE | Noted: 2017-06-20

## 2017-06-20 NOTE — PROGRESS NOTES
6/20/2017 Saw the patient with Dr Michelle Ledezma. Her pathology from left breast lumpectomy on 5/5 is as follows:     A: LEFT BREAST TISSUE: INFILTRATING DUCT CARCINOMA WITH LOBULAR FEATURES, LOW GRADE (WELL DIFFERENTIATED) MEASURING APPROXIMATELY 1.1 X 1 X 0.7 CM IN ASSOCIATION WITH DUCTAL CARCINOMA IN SITU, INTERMEDIATE GRADE (CRIBRIFORM TYPE). INFILTRATING CARCINOMA IS APPROXIMATELY 0.1 CN FROM MARKED SUPERIOR MARGIN. A MICROSCOPIC FOCUS OF TUMOR MOST LIKELY REPRESENTING INTRAMAMMARY LYMPHATIC SPREAD IS PRESENT, APPROXIMATELY 0.3 CM FROM MARKED ANTERIOR MARGIN. OTHER MARGINS ARE GREATER THAN 1 CM FROM TUMOR. SEE COMMENT. B: LEFT SENTINEL LYMPH NODE: LYMPH NODE NEGATIVE FOR CARCINOMA. C: LEFT SENTINEL NODE #2: LYMPH NODE NEGATIVE FOR METASTATIC CARCINOMA. ER 97% GA 86% Her 2 positive    Dr Michelle Ledezma discussed with the patient that she would need chemotherapy with Herceptin. We discussed Taxol/Herceptin. The patient was a bit caught off guard with the discussion of chemotherapy. I have given her my number and Nevin's number. Lina Perera will follow up on Thursday to see what her final decision is regarding treatment. She is agreeable to radiation and hormone blocking pill as this was the treatment she anticipated. Navigation will continue to follow.

## 2017-06-20 NOTE — PROGRESS NOTES
6/20/2017 The patient called me and she does want to proceed with chemotherapy. I have asked the  to begin work on port placement, echocardiogram, chemo education and chemo start. Navigation will continue to follow.

## 2017-06-21 ENCOUNTER — PATIENT OUTREACH (OUTPATIENT)
Dept: CASE MANAGEMENT | Age: 72
End: 2017-06-21

## 2017-06-21 NOTE — PROGRESS NOTES
6/21/17 pt called this morning and stated she changed her mind and will not take any chemo. Reason was she was clinically depressed and just could not do that. This is being managed by her psychologist, and she has an appt today with them. She is willing to do radiation, a referral was ordered. Dr. Donny Vera was made aware of pts decision. He would like follow up in the next 2 weeks to see pt.  making arrangement for appts and will call the pt. Navigation will continue to follow.

## 2017-07-03 ENCOUNTER — HOSPITAL ENCOUNTER (OUTPATIENT)
Dept: LAB | Age: 72
Discharge: HOME | End: 2017-07-03
Payer: MEDICARE

## 2017-07-03 ENCOUNTER — HOSPITAL ENCOUNTER (OUTPATIENT)
Dept: RADIATION ONCOLOGY | Age: 72
Discharge: HOME | End: 2017-07-03
Payer: MEDICARE

## 2017-07-03 ENCOUNTER — PATIENT OUTREACH (OUTPATIENT)
Dept: CASE MANAGEMENT | Age: 72
End: 2017-07-03

## 2017-07-03 VITALS
BODY MASS INDEX: 32.22 KG/M2 | DIASTOLIC BLOOD PRESSURE: 78 MMHG | WEIGHT: 187.7 LBS | SYSTOLIC BLOOD PRESSURE: 130 MMHG | TEMPERATURE: 98.2 F | OXYGEN SATURATION: 96 % | HEART RATE: 71 BPM

## 2017-07-03 DIAGNOSIS — C50.412 MALIGNANT NEOPLASM OF UPPER-OUTER QUADRANT OF LEFT FEMALE BREAST (HCC): ICD-10-CM

## 2017-07-03 LAB
ALBUMIN SERPL BCP-MCNC: 3.7 G/DL (ref 3.2–4.6)
ALBUMIN/GLOB SERPL: 0.9 {RATIO} (ref 1.2–3.5)
ALP SERPL-CCNC: 93 U/L (ref 50–136)
ALT SERPL-CCNC: 30 U/L (ref 12–65)
ANION GAP BLD CALC-SCNC: 8 MMOL/L (ref 7–16)
AST SERPL W P-5'-P-CCNC: 20 U/L (ref 15–37)
BASOPHILS # BLD AUTO: 0 K/UL (ref 0–0.2)
BASOPHILS # BLD: 0 % (ref 0–2)
BILIRUB SERPL-MCNC: 0.4 MG/DL (ref 0.2–1.1)
BUN SERPL-MCNC: 23 MG/DL (ref 8–23)
CALCIUM SERPL-MCNC: 9.8 MG/DL (ref 8.3–10.4)
CHLORIDE SERPL-SCNC: 98 MMOL/L (ref 98–107)
CO2 SERPL-SCNC: 32 MMOL/L (ref 21–32)
CREAT SERPL-MCNC: 1.13 MG/DL (ref 0.6–1)
DIFFERENTIAL METHOD BLD: ABNORMAL
EOSINOPHIL # BLD: 0.1 K/UL (ref 0–0.8)
EOSINOPHIL NFR BLD: 1 % (ref 0.5–7.8)
ERYTHROCYTE [DISTWIDTH] IN BLOOD BY AUTOMATED COUNT: 14.3 % (ref 11.9–14.6)
GLOBULIN SER CALC-MCNC: 3.9 G/DL (ref 2.3–3.5)
GLUCOSE SERPL-MCNC: 114 MG/DL (ref 65–100)
HCT VFR BLD AUTO: 39.1 % (ref 35.8–46.3)
HGB BLD-MCNC: 13.3 G/DL (ref 11.7–15.4)
LYMPHOCYTES # BLD AUTO: 21 % (ref 13–44)
LYMPHOCYTES # BLD: 2 K/UL (ref 0.5–4.6)
MCH RBC QN AUTO: 30.9 PG (ref 26.1–32.9)
MCHC RBC AUTO-ENTMCNC: 34 G/DL (ref 31.4–35)
MCV RBC AUTO: 90.9 FL (ref 79.6–97.8)
MONOCYTES # BLD: 0.7 K/UL (ref 0.1–1.3)
MONOCYTES NFR BLD AUTO: 7 % (ref 4–12)
NEUTS SEG # BLD: 7 K/UL (ref 1.7–8.2)
NEUTS SEG NFR BLD AUTO: 70 % (ref 43–78)
NRBC # BLD: 0 K/UL (ref 0–0.2)
PLATELET # BLD AUTO: 292 K/UL (ref 150–450)
PMV BLD AUTO: 10.7 FL (ref 10.8–14.1)
POTASSIUM SERPL-SCNC: 3.4 MMOL/L (ref 3.5–5.1)
PROT SERPL-MCNC: 7.6 G/DL (ref 6.3–8.2)
RBC # BLD AUTO: 4.3 M/UL (ref 4.05–5.25)
SODIUM SERPL-SCNC: 138 MMOL/L (ref 136–145)
WBC # BLD AUTO: 9.9 K/UL (ref 4.3–11.1)

## 2017-07-03 PROCEDURE — 99211 OFF/OP EST MAY X REQ PHY/QHP: CPT

## 2017-07-03 RX ORDER — PAROXETINE HYDROCHLORIDE 20 MG/1
TABLET, FILM COATED ORAL DAILY
COMMUNITY
End: 2017-09-14 | Stop reason: ALTCHOICE

## 2017-07-03 NOTE — PROGRESS NOTES
7/3/17 saw pt today with Dr. Terri Hood for follow up breast cancer. She has decided to not do chemo. Radiation saw her today. She is agreeable to have this. Discussed arimidex to start after radiation completed. Potential side effects reviewed. Baseline bone density. Follow up in 3 months to evaluate tolerance of AI. Encouraged to call with any concerns. Navigation will continue to follow.

## 2017-07-03 NOTE — PROGRESS NOTES
Pt here for consult for left breast cancer. S/p lumpectomy. F/u Dr. Abbi Rosenbaum today. Mri breast 17. Pt has hx depression. States is on new medicine. She states she came off meds because she felt well and started with  Depression symptoms right before diagnosis. She states she does not feel that she could go through chemo right now.  3/ Para 3. Started menses at 7 yoa. Menses stopped at age 63 with menopause. Hx contraceptive use for 9-10 months. No HRT. No symptoms.     John Clements, RN

## 2017-07-03 NOTE — CONSULTS
Patient: Sonia Marte MRN: [id number]  SSN: [id number]    YOB: 1945  Age: 71 y.o. Sex: female      Other Providers:  Eron Elizondo MD.      CHIEF COMPLAINT: Breast cancer. DIAGNOSIS: Left breast invasive ductal carcinoma, [id number], Stage IA. ER 97%, SC 86%, HER2 Positive. PREVIOUS TREATMENT:  1) 5/5/17:  Left breast lumpectomy. HISTORY OF PRESENT ILLNESS:  Sonia Marte is a 71 y.o. female who I am seeing at the request of Dr. Gopal Andrews. She previously completed routine yearly mammograms. She missed her mammogram in 2016 and was found to have a left breast mass by mammogram on 3/25/2017. The area was not palpable, and there were no associated breast changes including nipple discharge or skin change. She was sent for an ultrasound guided biopsy on 4/10/2017 with pathology positive for breast cancer, highly ER/SC positive and HER2 positive. She was then referred to Dr. Prieto Rosenbaum and underwent a left breast needle localized lumpectomy with sentinel lymph node biopsy on 5/5/2017, which showed the tumor to be 1.1 cm with 2 negative sentinel nodes. She was seen in medical oncology 6/20/2017. she was interested in their opinion but was very reluctant after medical oncology recommended chemotherapy. She wanted time to think about her options and was referred to us for consideration of radiation. She is accompanied by her  and is seeing medical oncology after our appointment today. She is dealing with worsening clinical depression as her major issue at the moment. OBSTETRIC HISTORY:    Menarche at the age of 7.    G1,P2. Oral Contraceptive Pills:  9-10 months.     Hormone Replacement Therapy:  None  Menopause at 63    PAST MEDICAL HISTORY:    Past Medical History:   Diagnosis Date    Abdominal pain, right upper quadrant     Abnormal weight gain     Acute sinusitis, unspecified     Benign neoplasm of colon 2/25/2015    Benign paroxysmal positional vertigo 2/25/2015    Calculus of gallbladder without mention of cholecystitis or obstruction 2/25/2015    Depressive disorder, not elsewhere classified 2/25/2015    Dysuria     Essential hypertension, benign 2/25/2015    Insomnia, unspecified 2/25/2015    Nausea alone     Neoplasm of uncertain behavior of skin     Other malaise and fatigue     Primary localized osteoarthrosis, unspecified site 2/25/2015    Urinary tract infection, site not specified     Vaginitis and vulvovaginitis, unspecified 2/25/2015       The patient denies history of collagen vascular diseases, pacemaker insertion, prior radiation or prior chemotherapy. PAST SURGICAL HISTORY:   Past Surgical History:   Procedure Laterality Date    HX BREAST LUMPECTOMY Right     benign per pt    HX BREAST LUMPECTOMY Left 5/5/2017    LEFT BREAST NEEDLE LOCALIZED LUMPECTOMY performed by Radha Woodall MD at [address] [place] [address] [place]:     Current Outpatient Prescriptions:     PARoxetine (PAXIL) 20 mg tablet, Take  by mouth daily. , Disp: , Rfl:     anastrozole (ARIMIDEX) 1 mg tablet, Take 1 Tab by mouth daily. Start medication after radiation is completed. , Disp: 30 Tab, Rfl: 5    promethazine (PHENERGAN) 25 mg tablet, Take 1 Tab by mouth every six (6) hours as needed for Nausea., Disp: 20 Tab, Rfl: 0    VITAMIN B COMPLEX PO, Take 1 Tab by mouth daily. , Disp: , Rfl:     MULTIVITAMIN PO, Take  by mouth daily. , Disp: , Rfl:     atenolol-chlorthalidone (TENORETIC) 50-25 mg per tablet, TAKE 1 TABLET BY MOUTH EVERY DAY (Patient taking differently: TAKE 1 TABLET BY MOUTH EVERY DAY - takes in the evening. Take / use AM day of surgery  per anesthesia protocols.), Disp: 90 Tab, Rfl: 3    LORazepam (ATIVAN) 1 mg tablet, Take 1 Tab by mouth every eight (8) hours as needed.  Take / use AM day of surgery  per anesthesia protocols if needed, Disp: , Rfl: 4    temazepam (RESTORIL) 30 mg capsule, Take  by mouth nightly as needed for Sleep., Disp: , Rfl: ALLERGIES:   Allergies   Allergen Reactions    Penicillins Other (comments)     unknown       SOCIAL HISTORY:   Social History     Social History    Marital status:      Spouse name: N/A    Number of children: N/A    Years of education: N/A     Occupational History    Not on file. Social History Main Topics    Smoking status: Never Smoker    Smokeless tobacco: Never Used    Alcohol use No    Drug use: No    Sexual activity: Not on file     Other Topics Concern    Not on file     Social History Narrative       FAMILY HISTORY:   Family History   Problem Relation Age of Onset    Cancer Maternal Aunt      breast, great aunt    Cancer Maternal Grandmother      great grandmother, breast       REVIEW OF SYSTEMS: Please see the completed review of systems sheet in the chart that I have reviewed today. PHYSICAL EXAMINATION:   ECOG Performance status 0  VITAL SIGNS:   Visit Vitals    /78 (BP 1 Location: Left arm, BP Patient Position: Sitting)    Pulse 71    Temp 98.2 °F (36.8 °C)    Wt 85.1 kg (187 lb 11.2 oz)    SpO2 96%    BMI 32.22 kg/m2        GENERAL: The patient is well-developed, ambulatory, alert and in no acute distress. HEENT: Head is normocephalic, atraumatic. Pupils are equal, round and reactive to light and accommodation. Extraocular movement intact. Hearing is intact bilaterally to finger rub. Oral cavity reveals no lesions. Mucous membranes are moist. NECK: Neck is supple with no masses. CARDIOVASCULAR: Heart is regular rate and rhythm. There are no murmurs rubs or gallups. Radial pulses are 2+ RESPIRATORY: Lungs are clear to auscultation and percussion. There is normal respiratory effort. GASTROINTESTINAL: The abdomen is soft, non-tender, nondistended with no hepatospelnomagaly. Digital rectal examination: deferred LYMPHATIC: There is no cervical, supraclavicular or axillary lymphadenopathy bilaterally.  MUSCULOSKELETAL: Extremities reveal no cyanosis, clubbing or edema.  is 5+/5. BREASTS: Examination of the unaffected breast reveals no dominant nodules or masses. The lumpectomy cavity appears well healed with good aesthetics and symmetry. Well healed surgical incision. NEURO:  Cranial nerves II-XII grossly intact. Muscular strength and sensation are intact throughout all four extremities. PATHOLOGY:       DIAGNOSIS   A: LEFT BREAST TISSUE: INFILTRATING DUCT CARCINOMA WITH LOBULAR FEATURES, LOW GRADE (WELL DIFFERENTIATED) MEASURING APPROXIMATELY 1.1 X 1 X 0.7 CM IN ASSOCIATION WITH DUCTAL CARCINOMA IN SITU, INTERMEDIATE GRADE (CRIBRIFORM TYPE). INFILTRATING CARCINOMA IS APPROXIMATELY 0.1 CN FROM MARKED SUPERIOR MARGIN. A MICROSCOPIC FOCUS OF TUMOR MOST LIKELY REPRESENTING INTRAMAMMARY LYMPHATIC SPREAD IS PRESENT, APPROXIMATELY 0.3 CM FROM MARKED ANTERIOR MARGIN. OTHER MARGINS ARE GREATER THAN 1 CM FROM TUMOR. SEE COMMENT. B: LEFT SENTINEL LYMPH NODE: LYMPH NODE NEGATIVE FOR CARCINOMA. C: LEFT SENTINEL NODE #2: LYMPH NODE NEGATIVE FOR METASTATIC CARCINOMA. Comment   Infiltrating carcinoma in this specimen is similar to that in prior core biopsy [id number] which has estrogen receptors of 97%, progesterone receptors of 86% and Her2 3+ positive. If clinically indicated receptor studies can be repeated in this material      A: ANATOMIC SITE: Left breast (presumably 2 o'clock position, 4 cm from nipple based on prior core biopsy). PROCEDURE: Needle localized wide excision. HISTOLOGIC TYPE: Infiltrating duct with lobular features. SIZE: Approximately 1.1 x 1 x 0.7 cm. UNIFOCAL OR MULTIFOCAL: Apparently unifocal with intramammary lymphatic focus. PRESENCE OF SKIN, NIPPLE OR SKELETAL MUSCLE INVOLVEMENT: Not identified. OLAYINKA MODIFICATION OF BLOOM-MAK GRADE:   ARCHITECTURAL SCORE: 2/3. NUCLEAR SCORE: 1/3. MITOTIC SCORE: 1/3. TOTAL SCORE: 4/9 = Low Grade. 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 LYMPHOVASCULAR INVASION: Apparent focus of intramammary lymphatic spread. ARE MICROCALCIFICATIONS IDENTIFIED: No.   TUMOR DISTANCE TO CLOSEST MARGIN: 0.1 cm from superior margin. ANTERIOR (SUPERFICIAL): Greater than 1 cm. POSTERIOR (DEEP): Greater than 1 cm. MEDIAL: Greater than 1 cm. LATERAL: Greater than 1 cm. INFERIOR: Approximately 0.3 cm (separate 1 mm focus away from primary). SUPERIOR: Infiltrating tumor is present approximately 0.1 from marked superior margin. LYMPH NODE STATUS:   TOTAL NUMBER OF LYMPH NODES CONTAINING CARCINOMA: 0.   TOTAL NUMBER OF LYMPH NODES EXAMINED: 2 (see B and C). METASTASIS:   SIZE OF LARGEST POSITIVE NODE: Does not apply. EXTRA-CAPSULAR EXTENSION OF TUMOR: Does not apply. OTHER FINDINGS: Changes consistent with prior biopsy site. TNM CLASSIFICATION: pT1 pN0. COLD ISCHEMIC TIME: 1 hour, 42 minutes. TOTAL TIME IN FORMALIN: 60 hours, 15 minutes. BLOCK NOT SENT BECAUSE RECEPTOR STUDIES PERFORMED ON PRIOR CORE BIOPSY (SEE [id number] FOR ESTROGEN RECEPTORS OF 97%, PROGESTERONE RECEPTOR STUDY A 86% AND HER2/3+ POSITIVE).      LABORATORY:   Lab Results   Component Value Date/Time    Sodium 138 07/03/2017 10:02 AM    Potassium 3.4 07/03/2017 10:02 AM    Chloride 98 07/03/2017 10:02 AM    CO2 32 07/03/2017 10:02 AM    Anion gap 8 07/03/2017 10:02 AM    Glucose 114 07/03/2017 10:02 AM    BUN 23 07/03/2017 10:02 AM    Creatinine 1.13 07/03/2017 10:02 AM    GFR est AA >60 07/03/2017 10:02 AM    GFR est non-AA 50 07/03/2017 10:02 AM    Calcium 9.8 07/03/2017 10:02 AM    Albumin 3.7 07/03/2017 10:02 AM    Protein, total 7.6 07/03/2017 10:02 AM    Globulin 3.9 07/03/2017 10:02 AM    A-G Ratio 0.9 07/03/2017 10:02 AM    AST (SGOT) 20 07/03/2017 10:02 AM    ALT (SGPT) 30 07/03/2017 10:02 AM     Lab Results   Component Value Date/Time    WBC 9.9 07/03/2017 10:02 AM    HGB 13.3 07/03/2017 10:02 AM    HCT 39.1 07/03/2017 10:02 AM    PLATELET 685 83/89/5222 10:02 AM RADIOLOGY:    Ultrasound of the left breast was coming pleated 3/24/2017 at [address]. This was compared against previous mammograms 3/15/2017 and 4/8/2015. At the 2 o'clock position there was a 7 x 7 x 8 mm dense nodule that was vertically oriented with irregular margins suspicious for malignancy. Biopsy was recommended, Bi-Rads 4    Mri Breast Bi W Wo Cont    Result Date: 4/19/2017  BREAST MRI BEFORE AND AFTER CONTRAST CLINICAL HISTORY:  Follow-up recent needle biopsy diagnosis of left 2:00 infiltrating duct carcinoma with lobular features; for preoperative evaluation. TECHNIQUE: Standard axial and sagittal images were obtained before and during bolus injection of 19 cc of MultiHance IV. CAD was employed. COMPARISON:  Multiple prior studies including bilateral mammography and left ultrasound of March 24, 2017 and left ultrasound biopsy of April 10, 2017. FINDINGS: There is minimal background parenchymal enhancement bilaterally. Left 2:00 carcinoma with biopsy marker clip has maximal diameter of 10 mm by MRI. 2 small enhancing foci just inferior to it (each measuring approximately 5 mm) appear to be stable mammographically compared with spot images from 2011. In retrospect, the more anterior of the 2 small nodules has decreased in size in the interim, suggesting a benign etiology. They may represent small fibroadenoma. No pathologically enlarged or dysmorphic lymph nodes are seen. The liver, lungs, and chest wall appear unremarkable as imaged. IMPRESSION:  1. LEFT 2:00 CARCINOMA HAS MAXIMAL MRI DIAMETER OF 10 MM. 2.  TWO VERY SMALL SUBJACENT ENHANCING FOCI APPEAR TO BE STABLE MAMMOGRAPHICALLY SINCE 2011, SUGGESTING BENIGN ETIOLOGIES. IF FURTHER PREOPERATIVE IMAGING EVALUATION OF POSSIBLE SATELLITE TUMOR NODULES IS CLINICALLY INDICATED, THEN SECOND-LOOK LEFT BREAST ULTRASOUND WITH POSSIBLE BIOPSY WOULD BE MOST USEFUL.  3.  NO MRI EVIDENCE OF CONTRALATERAL MALIGNANCY OR OF METASTATIC DISEASE IN THE IMAGED CHEST. BI-RADS Assessment Category 6: Known Biopsy Proven Malignancy  This case was reviewed in consultation with colleagues. IMPRESSION:  Lori Franz is a 71 y.o. female with Stage IA breast cancer. The natural history of breast cancer was reviewed with the patient. Prognostic features including stage, performance status and presence or absence of lymph node involvement were reviewed with the patient. Various treatment options including lumpectomy alone, breast conservation therapy, and mastectomy were compared and contrasted with regard to outcome and quality of life. We discussed the data in support of breast conservation therapy, specifically NSABP B-06, which compared mastectomy to lumpectomy plus or minus radiation. This showed no difference in overall survival but improved local regional control with adjuvant radiation. This has become the standard for patient's wishing to conserve the breast.  Subsequent trials have evaluated the role of boost following an initial course and again showed improved control. We therefore typically recommend 50 gray to the breast followed by a 10 Gray boost to the lumpectomy cavity. There is now also long-term data in support of hypofractionation which has come out of [address] [place] [address] where 3-4 weeks of radiation was compared to the conventional treatment and to date has shown equivalent tumor control and cosmetic outcomes. This is an option for women who were not excluded from the studies or found on subgroup analysis to suffer worse outcomes: women with Grade III tumors, generally women receiving chemotherapy, with large breast size such that significant heterogeneity can not be avoided, and lymph node node positive disease. CALE consensus guidelines now support these conclusions.       For women 79years old or over, data from [facility] [address], a CAL study, has demonstrated no improvement in overall survival with tamoxifen alone as compared to tamoxifen and radiation. No difference in survival has been demonstrated despite a small but statistically significant worsening of local control. For this reason, the omission of radiation is acceptable if the patient agrees to hormone therapy. However, this trial admitted patient's who would require chemotherapy and these conclusions cannot therefore be made in the setting of HER-2 positive breast cancer. For that reason I would offer radiation but despite not being well represented in the hypo-fractionated radiation protocols, I would feel comfortable with this approach. however, she was very reluctant to consider chemotherapy and will likely wish to proceed with radiation alone. Despite not needing chemotherapy, considering she is refusing as opposed to it not being recommended, I still feel she warrants radiation. The practicalities, indications, benefits, side-effects and complications of radiation therapy were discussed. Skin changes fatigue, and potential for long-term complications including radiation pneumonitis, and rib fractures were among the complications highlighted. I have recommended a course of radiation therapy to the breast as a standard portion of breast conservation therapy. She will likely be doing radiation alone but is meeting with Dr. Hussain Uribe later today. We have gone ahead and schedule her for simulation on Wednesday which could change if she changes her mind about chemotherapy. Plan:  1. Genetic testing-not indicated  2. Smoking cessation-not indicated  3. Patient will be simulated Wednesday, with radiotherapy to begin shortly thereafter unless she elects to proceed with chemo in which case we would proceed afterwards. A dose of 42.56 Gy to the whole breast followed by a 10 Gy in 5 fraction boost to the tumor bed will be prescribed.      Fatou Byrne MD   July 3, 2017

## 2017-07-05 ENCOUNTER — HOSPITAL ENCOUNTER (OUTPATIENT)
Dept: RADIATION ONCOLOGY | Age: 72
Discharge: HOME | End: 2017-07-05
Payer: MEDICARE

## 2017-07-05 VITALS
TEMPERATURE: 97.7 F | HEART RATE: 68 BPM | DIASTOLIC BLOOD PRESSURE: 66 MMHG | OXYGEN SATURATION: 96 % | SYSTOLIC BLOOD PRESSURE: 119 MMHG

## 2017-07-05 PROCEDURE — 77290 THER RAD SIMULAJ FIELD CPLX: CPT

## 2017-07-05 PROCEDURE — 77332 RADIATION TREATMENT AID(S): CPT

## 2017-07-05 NOTE — PROGRESS NOTES
Pt here for See/Sim left breast cancer. Consents signed for RT. Skin care sheet given to pt. Informed pt to hold vitamins during RT.     Elmer Partida RN

## 2017-07-10 ENCOUNTER — HOSPITAL ENCOUNTER (OUTPATIENT)
Dept: RADIATION ONCOLOGY | Age: 72
Discharge: HOME | End: 2017-07-10
Payer: MEDICARE

## 2017-07-10 PROCEDURE — 77300 RADIATION THERAPY DOSE PLAN: CPT

## 2017-07-10 PROCEDURE — 77295 3-D RADIOTHERAPY PLAN: CPT

## 2017-07-10 PROCEDURE — 77334 RADIATION TREATMENT AID(S): CPT

## 2017-07-12 ENCOUNTER — HOSPITAL ENCOUNTER (OUTPATIENT)
Dept: MAMMOGRAPHY | Age: 72
Discharge: HOME | End: 2017-07-12
Attending: INTERNAL MEDICINE
Payer: MEDICARE

## 2017-07-12 DIAGNOSIS — Z78.0 POST-MENOPAUSAL: ICD-10-CM

## 2017-07-12 PROCEDURE — 77080 DXA BONE DENSITY AXIAL: CPT

## 2017-07-18 ENCOUNTER — HOSPITAL ENCOUNTER (OUTPATIENT)
Dept: RADIATION ONCOLOGY | Age: 72
Discharge: HOME | End: 2017-07-18
Payer: MEDICARE

## 2017-07-18 PROCEDURE — 77412 RADIATION TX DELIVERY LVL 3: CPT

## 2017-07-18 PROCEDURE — 77280 THER RAD SIMULAJ FIELD SMPL: CPT

## 2017-07-19 ENCOUNTER — HOSPITAL ENCOUNTER (OUTPATIENT)
Dept: RADIATION ONCOLOGY | Age: 72
Discharge: HOME | End: 2017-07-19
Payer: MEDICARE

## 2017-07-19 PROCEDURE — 77412 RADIATION TX DELIVERY LVL 3: CPT

## 2017-07-20 ENCOUNTER — HOSPITAL ENCOUNTER (OUTPATIENT)
Dept: RADIATION ONCOLOGY | Age: 72
Discharge: HOME | End: 2017-07-20
Payer: MEDICARE

## 2017-07-20 PROCEDURE — 77412 RADIATION TX DELIVERY LVL 3: CPT

## 2017-07-21 ENCOUNTER — HOSPITAL ENCOUNTER (OUTPATIENT)
Dept: RADIATION ONCOLOGY | Age: 72
Discharge: HOME | End: 2017-07-21
Payer: MEDICARE

## 2017-07-21 PROCEDURE — 77412 RADIATION TX DELIVERY LVL 3: CPT

## 2017-07-24 ENCOUNTER — HOSPITAL ENCOUNTER (OUTPATIENT)
Dept: RADIATION ONCOLOGY | Age: 72
Discharge: HOME | End: 2017-07-24
Payer: MEDICARE

## 2017-07-24 PROCEDURE — 77412 RADIATION TX DELIVERY LVL 3: CPT

## 2017-07-24 PROCEDURE — 77336 RADIATION PHYSICS CONSULT: CPT

## 2017-07-25 ENCOUNTER — HOSPITAL ENCOUNTER (OUTPATIENT)
Dept: RADIATION ONCOLOGY | Age: 72
Discharge: HOME | End: 2017-07-25
Payer: MEDICARE

## 2017-07-25 PROCEDURE — 77417 THER RADIOLOGY PORT IMAGE(S): CPT

## 2017-07-25 PROCEDURE — 77412 RADIATION TX DELIVERY LVL 3: CPT

## 2017-07-26 ENCOUNTER — HOSPITAL ENCOUNTER (OUTPATIENT)
Dept: RADIATION ONCOLOGY | Age: 72
Discharge: HOME | End: 2017-07-26
Payer: MEDICARE

## 2017-07-26 PROCEDURE — 77412 RADIATION TX DELIVERY LVL 3: CPT

## 2017-07-27 ENCOUNTER — HOSPITAL ENCOUNTER (OUTPATIENT)
Dept: RADIATION ONCOLOGY | Age: 72
Discharge: HOME | End: 2017-07-27
Payer: MEDICARE

## 2017-07-27 PROCEDURE — 77412 RADIATION TX DELIVERY LVL 3: CPT

## 2017-07-28 ENCOUNTER — HOSPITAL ENCOUNTER (OUTPATIENT)
Dept: RADIATION ONCOLOGY | Age: 72
Discharge: HOME | End: 2017-07-28
Payer: MEDICARE

## 2017-07-28 PROCEDURE — 77412 RADIATION TX DELIVERY LVL 3: CPT

## 2017-07-31 ENCOUNTER — HOSPITAL ENCOUNTER (OUTPATIENT)
Dept: RADIATION ONCOLOGY | Age: 72
Discharge: HOME | End: 2017-07-31
Payer: MEDICARE

## 2017-07-31 PROCEDURE — 77412 RADIATION TX DELIVERY LVL 3: CPT

## 2017-07-31 PROCEDURE — 77290 THER RAD SIMULAJ FIELD CPLX: CPT

## 2017-07-31 PROCEDURE — 77321 SPECIAL TELETX PORT PLAN: CPT

## 2017-07-31 PROCEDURE — 77334 RADIATION TREATMENT AID(S): CPT

## 2017-07-31 PROCEDURE — 77336 RADIATION PHYSICS CONSULT: CPT

## 2017-07-31 NOTE — PROGRESS NOTES
Patient: Beau Weber MRN: [id number]  SSN: [id number]    YOB: 1945  Age: 71 y.o. Sex: female      DIAGNOSIS: Left breast invasive ductal carcinoma, [id number], Stage IA. ER 97%, LA 86%, HER2 Positive.       PREVIOUS TREATMENT:  1) 5/5/17:  Left breast lumpectomy. TREATMENT SITE:  Left breast    DOSE and FRACTIONATION:  10/16 fractions, 2660 cGy of 4256 cGy, 0/5 boost.      INTERVAL HISTORY:  Beau Weber is a 71 y.o. female being treated for breast cancer. She was doing well without complaints week 1-2. OBJECTIVE:  No findings week 1. Mild pin color week 2. There were no vitals taken for this visit. Lab Results   Component Value Date/Time    Sodium 138 07/03/2017 10:02 AM    Potassium 3.4 07/03/2017 10:02 AM    Chloride 98 07/03/2017 10:02 AM    CO2 32 07/03/2017 10:02 AM    Anion gap 8 07/03/2017 10:02 AM    Glucose 114 07/03/2017 10:02 AM    BUN 23 07/03/2017 10:02 AM    Creatinine 1.13 07/03/2017 10:02 AM    GFR est AA >60 07/03/2017 10:02 AM    GFR est non-AA 50 07/03/2017 10:02 AM    Calcium 9.8 07/03/2017 10:02 AM    Albumin 3.7 07/03/2017 10:02 AM    Protein, total 7.6 07/03/2017 10:02 AM    Globulin 3.9 07/03/2017 10:02 AM    A-G Ratio 0.9 07/03/2017 10:02 AM    AST (SGOT) 20 07/03/2017 10:02 AM    ALT (SGPT) 30 07/03/2017 10:02 AM     Lab Results   Component Value Date/Time    WBC 9.9 07/03/2017 10:02 AM    HGB 13.3 07/03/2017 10:02 AM    HCT 39.1 07/03/2017 10:02 AM    PLATELET 025 57/03/1070 10:02 AM       ASSESSMENT and PLAN:  Beau Weber is tolerating radiation as anticipated for the current dose and fraction. We will continue on as planned with another treatment visit anticipated next week.         Shey Flores MD   July 31, 2017

## 2017-08-01 ENCOUNTER — HOSPITAL ENCOUNTER (OUTPATIENT)
Dept: RADIATION ONCOLOGY | Age: 72
Discharge: HOME | End: 2017-08-01
Payer: MEDICARE

## 2017-08-01 PROCEDURE — 77417 THER RADIOLOGY PORT IMAGE(S): CPT

## 2017-08-01 PROCEDURE — 77412 RADIATION TX DELIVERY LVL 3: CPT

## 2017-08-02 ENCOUNTER — HOSPITAL ENCOUNTER (OUTPATIENT)
Dept: RADIATION ONCOLOGY | Age: 72
Discharge: HOME | End: 2017-08-02
Payer: MEDICARE

## 2017-08-02 PROCEDURE — 77412 RADIATION TX DELIVERY LVL 3: CPT

## 2017-08-02 PROCEDURE — 77331 SPECIAL RADIATION DOSIMETRY: CPT

## 2017-08-03 ENCOUNTER — HOSPITAL ENCOUNTER (OUTPATIENT)
Dept: RADIATION ONCOLOGY | Age: 72
Discharge: HOME | End: 2017-08-03
Payer: MEDICARE

## 2017-08-03 PROCEDURE — 77412 RADIATION TX DELIVERY LVL 3: CPT

## 2017-08-04 ENCOUNTER — HOSPITAL ENCOUNTER (OUTPATIENT)
Dept: RADIATION ONCOLOGY | Age: 72
Discharge: HOME | End: 2017-08-04
Payer: MEDICARE

## 2017-08-04 PROCEDURE — 77412 RADIATION TX DELIVERY LVL 3: CPT

## 2017-08-07 ENCOUNTER — HOSPITAL ENCOUNTER (OUTPATIENT)
Dept: RADIATION ONCOLOGY | Age: 72
Discharge: HOME | End: 2017-08-07
Payer: MEDICARE

## 2017-08-07 PROCEDURE — 77412 RADIATION TX DELIVERY LVL 3: CPT

## 2017-08-07 PROCEDURE — 77336 RADIATION PHYSICS CONSULT: CPT

## 2017-08-07 NOTE — PROGRESS NOTES
Patient: Michael Nicholas MRN: [id number]  SSN: [id number]    YOB: 1945  Age: 71 y.o. Sex: female      DIAGNOSIS: Left breast invasive ductal carcinoma, [id number], Stage IA. ER 97%, NM 86%, HER2 Positive.       PREVIOUS TREATMENT:  1) 5/5/17:  Left breast lumpectomy. TREATMENT SITE:  Left breast    DOSE and FRACTIONATION:  15/16 fractions, 3990 cGy of 4256 cGy, 0/5 boost.      INTERVAL HISTORY:  Michael Nicholas is a 71 y.o. female being treated for breast cancer. She was doing well without complaints week 1-3. OBJECTIVE:  No findings week 1. Mild pink color week 2. There were no vitals taken for this visit. Lab Results   Component Value Date/Time    Sodium 138 07/03/2017 10:02 AM    Potassium 3.4 07/03/2017 10:02 AM    Chloride 98 07/03/2017 10:02 AM    CO2 32 07/03/2017 10:02 AM    Anion gap 8 07/03/2017 10:02 AM    Glucose 114 07/03/2017 10:02 AM    BUN 23 07/03/2017 10:02 AM    Creatinine 1.13 07/03/2017 10:02 AM    GFR est AA >60 07/03/2017 10:02 AM    GFR est non-AA 50 07/03/2017 10:02 AM    Calcium 9.8 07/03/2017 10:02 AM    Albumin 3.7 07/03/2017 10:02 AM    Protein, total 7.6 07/03/2017 10:02 AM    Globulin 3.9 07/03/2017 10:02 AM    A-G Ratio 0.9 07/03/2017 10:02 AM    AST (SGOT) 20 07/03/2017 10:02 AM    ALT (SGPT) 30 07/03/2017 10:02 AM     Lab Results   Component Value Date/Time    WBC 9.9 07/03/2017 10:02 AM    HGB 13.3 07/03/2017 10:02 AM    HCT 39.1 07/03/2017 10:02 AM    PLATELET 134 25/70/0579 10:02 AM       ASSESSMENT and PLAN:  Michael Nicholas is tolerating radiation as anticipated for the current dose and fraction. We will continue on as planned with another treatment visit anticipated next week.         Luke Colon MD   August 7, 2017

## 2017-08-08 ENCOUNTER — HOSPITAL ENCOUNTER (OUTPATIENT)
Dept: RADIATION ONCOLOGY | Age: 72
Discharge: HOME | End: 2017-08-08
Payer: MEDICARE

## 2017-08-08 PROCEDURE — 77412 RADIATION TX DELIVERY LVL 3: CPT

## 2017-08-08 PROCEDURE — 77280 THER RAD SIMULAJ FIELD SMPL: CPT

## 2017-08-09 ENCOUNTER — HOSPITAL ENCOUNTER (OUTPATIENT)
Dept: RADIATION ONCOLOGY | Age: 72
Discharge: HOME | End: 2017-08-09
Payer: MEDICARE

## 2017-08-09 PROCEDURE — 77412 RADIATION TX DELIVERY LVL 3: CPT

## 2017-08-10 ENCOUNTER — APPOINTMENT (OUTPATIENT)
Dept: RADIATION ONCOLOGY | Age: 72
End: 2017-08-10
Payer: MEDICARE

## 2017-08-11 ENCOUNTER — HOSPITAL ENCOUNTER (OUTPATIENT)
Dept: RADIATION ONCOLOGY | Age: 72
Discharge: HOME | End: 2017-08-11
Payer: MEDICARE

## 2017-08-11 PROCEDURE — 77412 RADIATION TX DELIVERY LVL 3: CPT

## 2017-08-14 ENCOUNTER — HOSPITAL ENCOUNTER (OUTPATIENT)
Dept: RADIATION ONCOLOGY | Age: 72
Discharge: HOME | End: 2017-08-14
Payer: MEDICARE

## 2017-08-14 PROBLEM — R63.4 WEIGHT LOSS: Status: ACTIVE | Noted: 2017-08-14

## 2017-08-14 PROCEDURE — 77412 RADIATION TX DELIVERY LVL 3: CPT

## 2017-08-15 ENCOUNTER — HOSPITAL ENCOUNTER (OUTPATIENT)
Dept: RADIATION ONCOLOGY | Age: 72
Discharge: HOME | End: 2017-08-15
Payer: MEDICARE

## 2017-08-15 PROCEDURE — 77336 RADIATION PHYSICS CONSULT: CPT

## 2017-08-15 PROCEDURE — 77412 RADIATION TX DELIVERY LVL 3: CPT

## 2017-08-16 ENCOUNTER — HOSPITAL ENCOUNTER (OUTPATIENT)
Dept: RADIATION ONCOLOGY | Age: 72
Discharge: HOME | End: 2017-08-16
Payer: MEDICARE

## 2017-08-16 PROCEDURE — 77412 RADIATION TX DELIVERY LVL 3: CPT

## 2017-09-14 ENCOUNTER — HOSPITAL ENCOUNTER (OUTPATIENT)
Dept: RADIATION ONCOLOGY | Age: 72
Discharge: HOME | End: 2017-09-14
Payer: MEDICARE

## 2017-09-14 VITALS
DIASTOLIC BLOOD PRESSURE: 76 MMHG | BODY MASS INDEX: 31.22 KG/M2 | HEART RATE: 66 BPM | OXYGEN SATURATION: 97 % | TEMPERATURE: 98.1 F | WEIGHT: 181.9 LBS | SYSTOLIC BLOOD PRESSURE: 128 MMHG

## 2017-09-14 DIAGNOSIS — C50.912 MALIGNANT NEOPLASM OF LEFT FEMALE BREAST, UNSPECIFIED SITE OF BREAST: Primary | ICD-10-CM

## 2017-09-14 PROCEDURE — 99211 OFF/OP EST MAY X REQ PHY/QHP: CPT

## 2017-09-14 RX ORDER — FLUOXETINE 20 MG/1
40 TABLET ORAL DAILY
COMMUNITY
End: 2018-02-08

## 2017-09-14 NOTE — PROGRESS NOTES
Pt here for one month f/u for left breast cancer. S/p lumpectomy, no chemo, RT.  RT end 8-16-17. Bilateral mammogram 2-7-17. Bone density 7-12-17. F/u Dr. Roberto Dorado 10-3-17. Taking Arimidex daily. Pt states she  is severely depressed. Has been seeing  for 11 years. States she stopped medicine and now is struggling. She states she cannot handle chemo right now.       Caprice Rodriguez RN

## 2017-09-14 NOTE — PROGRESS NOTES
Patient: George Snyder MRN: [id number]  SSN: [id number]    YOB: 1945  Age: 71 y.o. Sex: female      Other Providers:  Tin Linder MD.      CHIEF COMPLAINT: Breast cancer. DIAGNOSIS: Left breast invasive ductal carcinoma, [id number], Stage IA. ER 97%, OK 86%, HER2 Positive. PREVIOUS TREATMENT:  1) 5/5/17:  Left breast lumpectomy. 2) Radiation of the left breast. DOSE and FRACTIONATION:  16 fractions, 4256 cGy, 5 boost.        Treatment date: 07/18 through 08/16/17    HISTORY OF PRESENT ILLNESS:  George Snyder is a 71 y.o. female initially seen by Dr. Dk Sue at the request of Dr. Zack Villagomez. She previously completed routine yearly mammograms. She missed her mammogram in 2016 and was found to have a left breast mass by mammogram on 3/25/2017. The area was not palpable, and there were no associated breast changes including nipple discharge or skin change. She was sent for an ultrasound guided biopsy on 4/10/2017 with pathology positive for breast cancer, highly ER/OK positive and HER2 positive. She was then referred to Dr. Sami Easley and underwent a left breast needle localized lumpectomy with sentinel lymph node biopsy on 5/5/2017, which showed the tumor to be 1.1 cm with 2 negative sentinel nodes. She was seen in medical oncology 6/20/2017. she was interested in their opinion but was very reluctant after medical oncology recommended chemotherapy. She wanted time to think about her options and was referred to us for consideration of radiation. INTERVAL HISTORY: Ms Jaguar Ceja returns today for follow up 1 month after completing radiation therapy for her left breast invasive ductal carcinoma. She tolerated radiation well. She reports having some mild erythema, otherwise no complaints. Her energy is on the low side. She has been dealing with depression since February. She had stopped all medications at this time.  She reports that she is back on medication and is followed by her psychiatrist. She denies any suicide thoughts. She is supported by her , family members and friends. OBSTETRIC HISTORY:    Menarche at the age of 7.    G1,P2. Oral Contraceptive Pills:  9-10 months. Hormone Replacement Therapy:  None  Menopause at 63    PAST MEDICAL HISTORY:    Past Medical History:   Diagnosis Date    Abdominal pain, right upper quadrant     Abnormal weight gain     Acute sinusitis, unspecified     Benign neoplasm of colon 2/25/2015    Benign paroxysmal positional vertigo 2/25/2015    Calculus of gallbladder without mention of cholecystitis or obstruction 2/25/2015    Depressive disorder, not elsewhere classified 2/25/2015    Dysuria     Essential hypertension, benign 2/25/2015    Insomnia, unspecified 2/25/2015    Nausea alone     Neoplasm of uncertain behavior of skin     Other malaise and fatigue     Primary localized osteoarthrosis, unspecified site 2/25/2015    Urinary tract infection, site not specified     Vaginitis and vulvovaginitis, unspecified 2/25/2015       The patient denies history of collagen vascular diseases, pacemaker insertion, prior radiation or prior chemotherapy. PAST SURGICAL HISTORY:   Past Surgical History:   Procedure Laterality Date    HX BREAST LUMPECTOMY Right     benign per pt    HX BREAST LUMPECTOMY Left 5/5/2017    LEFT BREAST NEEDLE LOCALIZED LUMPECTOMY performed by Roro Daniel MD at [address] [place] [address] [place]:     Current Outpatient Prescriptions:     PARoxetine (PAXIL) 20 mg tablet, Take  by mouth daily. , Disp: , Rfl:     anastrozole (ARIMIDEX) 1 mg tablet, Take 1 Tab by mouth daily. Start medication after radiation is completed. , Disp: 30 Tab, Rfl: 5    promethazine (PHENERGAN) 25 mg tablet, Take 1 Tab by mouth every six (6) hours as needed for Nausea., Disp: 20 Tab, Rfl: 0    atenolol-chlorthalidone (TENORETIC) 50-25 mg per tablet, TAKE 1 TABLET BY MOUTH EVERY DAY (Patient taking differently: TAKE 1 TABLET BY MOUTH EVERY DAY - takes in the evening. Take / use AM day of surgery  per anesthesia protocols.), Disp: 90 Tab, Rfl: 3    LORazepam (ATIVAN) 1 mg tablet, Take 1 Tab by mouth every eight (8) hours as needed. Take / use AM day of surgery  per anesthesia protocols if needed, Disp: , Rfl: 4    temazepam (RESTORIL) 30 mg capsule, Take  by mouth nightly as needed for Sleep., Disp: , Rfl:     ALLERGIES:   Allergies   Allergen Reactions    Penicillins Other (comments)     unknown       SOCIAL HISTORY:   Social History     Social History    Marital status:      Spouse name: N/A    Number of children: N/A    Years of education: N/A     Occupational History    Not on file. Social History Main Topics    Smoking status: Never Smoker    Smokeless tobacco: Never Used    Alcohol use No    Drug use: No    Sexual activity: Not on file     Other Topics Concern    Not on file     Social History Narrative       FAMILY HISTORY:   Family History   Problem Relation Age of Onset    Cancer Maternal Aunt      breast, great aunt    Cancer Maternal Grandmother      great grandmother, breast       REVIEW OF SYSTEMS: Please see the completed review of systems sheet in the chart that I have reviewed today. PHYSICAL EXAMINATION:   ECOG Performance status 0  VITAL SIGNS:   There were no vitals taken for this visit. GENERAL: The patient is well-developed, ambulatory, alert and in no acute distress. HEENT: Head is normocephalic, atraumatic. NECK: Neck is supple with no masses. BREASTS: Examination of the unaffected breast reveals no dominant nodules or masses. The lumpectomy cavity appears well healed with good aesthetics and symmetry. Well healed surgical incision. Mild erythema of the radiation field.         PATHOLOGY:       DIAGNOSIS   A: LEFT BREAST TISSUE: INFILTRATING DUCT CARCINOMA WITH LOBULAR FEATURES, LOW GRADE (WELL DIFFERENTIATED) MEASURING APPROXIMATELY 1.1 X 1 X 0.7 CM IN ASSOCIATION WITH DUCTAL CARCINOMA IN SITU, INTERMEDIATE GRADE (CRIBRIFORM TYPE). INFILTRATING CARCINOMA IS APPROXIMATELY 0.1 CN FROM MARKED SUPERIOR MARGIN. A MICROSCOPIC FOCUS OF TUMOR MOST LIKELY REPRESENTING INTRAMAMMARY LYMPHATIC SPREAD IS PRESENT, APPROXIMATELY 0.3 CM FROM MARKED ANTERIOR MARGIN. OTHER MARGINS ARE GREATER THAN 1 CM FROM TUMOR. SEE COMMENT. B: LEFT SENTINEL LYMPH NODE: LYMPH NODE NEGATIVE FOR CARCINOMA. C: LEFT SENTINEL NODE #2: LYMPH NODE NEGATIVE FOR METASTATIC CARCINOMA. Comment   Infiltrating carcinoma in this specimen is similar to that in prior core biopsy [id number] which has estrogen receptors of 97%, progesterone receptors of 86% and Her2 3+ positive. If clinically indicated receptor studies can be repeated in this material      A: ANATOMIC SITE: Left breast (presumably 2 o'clock position, 4 cm from nipple based on prior core biopsy). PROCEDURE: Needle localized wide excision. HISTOLOGIC TYPE: Infiltrating duct with lobular features. SIZE: Approximately 1.1 x 1 x 0.7 cm. UNIFOCAL OR MULTIFOCAL: Apparently unifocal with intramammary lymphatic focus. PRESENCE OF SKIN, NIPPLE OR SKELETAL MUSCLE INVOLVEMENT: Not identified. OLAYINKA MODIFICATION OF BLOOM-MAK GRADE:   ARCHITECTURAL SCORE: 2/3. NUCLEAR SCORE: 1/3. MITOTIC SCORE: 1/3. TOTAL SCORE: 4/9 = Low Grade. 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 LYMPHOVASCULAR INVASION: Apparent focus of intramammary lymphatic spread. ARE MICROCALCIFICATIONS IDENTIFIED: No.   TUMOR DISTANCE TO CLOSEST MARGIN: 0.1 cm from superior margin. ANTERIOR (SUPERFICIAL): Greater than 1 cm. POSTERIOR (DEEP): Greater than 1 cm. MEDIAL: Greater than 1 cm. LATERAL: Greater than 1 cm. INFERIOR: Approximately 0.3 cm (separate 1 mm focus away from primary). SUPERIOR: Infiltrating tumor is present approximately 0.1 from marked superior margin.    LYMPH NODE STATUS:   TOTAL NUMBER OF LYMPH NODES CONTAINING CARCINOMA: 0. TOTAL NUMBER OF LYMPH NODES EXAMINED: 2 (see B and C). METASTASIS:   SIZE OF LARGEST POSITIVE NODE: Does not apply. EXTRA-CAPSULAR EXTENSION OF TUMOR: Does not apply. OTHER FINDINGS: Changes consistent with prior biopsy site. TNM CLASSIFICATION: pT1 pN0. COLD ISCHEMIC TIME: 1 hour, 42 minutes. TOTAL TIME IN FORMALIN: 60 hours, 15 minutes. BLOCK NOT SENT BECAUSE RECEPTOR STUDIES PERFORMED ON PRIOR CORE BIOPSY (SEE [id number] FOR ESTROGEN RECEPTORS OF 97%, PROGESTERONE RECEPTOR STUDY A 86% AND HER2/3+ POSITIVE). LABORATORY: none today    RADIOLOGY:    Ultrasound of the left breast was coming pleated 3/24/2017 at [address]. This was compared against previous mammograms 3/15/2017 and 4/8/2015. At the 2 o'clock position there was a 7 x 7 x 8 mm dense nodule that was vertically oriented with irregular margins suspicious for malignancy. Biopsy was recommended, Bi-Rads 4    Mri Breast Bi W Wo Cont    Result Date: 4/19/2017  BREAST MRI BEFORE AND AFTER CONTRAST CLINICAL HISTORY:  Follow-up recent needle biopsy diagnosis of left 2:00 infiltrating duct carcinoma with lobular features; for preoperative evaluation. TECHNIQUE: Standard axial and sagittal images were obtained before and during bolus injection of 19 cc of MultiHance IV. CAD was employed. COMPARISON:  Multiple prior studies including bilateral mammography and left ultrasound of March 24, 2017 and left ultrasound biopsy of April 10, 2017. FINDINGS: There is minimal background parenchymal enhancement bilaterally. Left 2:00 carcinoma with biopsy marker clip has maximal diameter of 10 mm by MRI. 2 small enhancing foci just inferior to it (each measuring approximately 5 mm) appear to be stable mammographically compared with spot images from 2011. In retrospect, the more anterior of the 2 small nodules has decreased in size in the interim, suggesting a benign etiology. They may represent small fibroadenoma.   No pathologically enlarged or dysmorphic lymph nodes are seen. The liver, lungs, and chest wall appear unremarkable as imaged. IMPRESSION:  1. LEFT 2:00 CARCINOMA HAS MAXIMAL MRI DIAMETER OF 10 MM. 2.  TWO VERY SMALL SUBJACENT ENHANCING FOCI APPEAR TO BE STABLE MAMMOGRAPHICALLY SINCE 2011, SUGGESTING BENIGN ETIOLOGIES. IF FURTHER PREOPERATIVE IMAGING EVALUATION OF POSSIBLE SATELLITE TUMOR NODULES IS CLINICALLY INDICATED, THEN SECOND-LOOK LEFT BREAST ULTRASOUND WITH POSSIBLE BIOPSY WOULD BE MOST USEFUL. 3.  NO MRI EVIDENCE OF CONTRALATERAL MALIGNANCY OR OF METASTATIC DISEASE IN THE IMAGED CHEST. BI-RADS Assessment Category 6: Known Biopsy Proven Malignancy  This case was reviewed in consultation with colleagues. IMPRESSION/PLAN:  Ricardo Coronel is a 71 y.o. female with left breast invasive ductal carcinoma, [id number], Stage IA. ER 97%, RI 86%, HER2 Positive S/P left breast lumpectomy, 05/05/17. She completed radiation therapy, 08/16/17. She is now 1 month after completing radiation and is recovering as anticipated. She states that she refused chemotherapy as recommended by  because of her depression. She did start Arimidex immediately after completing radiation and is tolerating well. She is scheduled with Dr. Sawyer Perez early October for follow up. I will go ahead and schedule a mammogram for March 2018 and see her shortly thereafter. I spent 25 minutes in direct counseling and ongoing management of her breast cancer. In regards to her clinical depression, she will continue follow up with psychiatry. All questions were answered to the best of my ability. Izabela Osborne NP   September 14, 2017       Portions of this note were copied from prior encounters and reviewed for accuracy, currency, and represent documentation and tasks completed during this encounter. I verify and attest these portions to be unchanged from prior visits.

## 2017-10-03 ENCOUNTER — HOSPITAL ENCOUNTER (OUTPATIENT)
Dept: LAB | Age: 72
Discharge: HOME | End: 2017-10-03
Payer: MEDICARE

## 2017-10-03 ENCOUNTER — PATIENT OUTREACH (OUTPATIENT)
Dept: CASE MANAGEMENT | Age: 72
End: 2017-10-03

## 2017-10-03 DIAGNOSIS — C50.412 MALIGNANT NEOPLASM OF UPPER-OUTER QUADRANT OF LEFT FEMALE BREAST (HCC): ICD-10-CM

## 2017-10-03 LAB
ALBUMIN SERPL-MCNC: 3.8 G/DL (ref 3.2–4.6)
ALBUMIN/GLOB SERPL: 1 {RATIO} (ref 1.2–3.5)
ALP SERPL-CCNC: 82 U/L (ref 50–136)
ALT SERPL-CCNC: 29 U/L (ref 12–65)
ANION GAP SERPL CALC-SCNC: 6 MMOL/L (ref 7–16)
AST SERPL-CCNC: 23 U/L (ref 15–37)
BASOPHILS # BLD: 0 K/UL (ref 0–0.2)
BASOPHILS NFR BLD: 0 % (ref 0–2)
BILIRUB SERPL-MCNC: 0.4 MG/DL (ref 0.2–1.1)
BUN SERPL-MCNC: 18 MG/DL (ref 8–23)
CALCIUM SERPL-MCNC: 10.3 MG/DL (ref 8.3–10.4)
CHLORIDE SERPL-SCNC: 93 MMOL/L (ref 98–107)
CO2 SERPL-SCNC: 32 MMOL/L (ref 21–32)
CREAT SERPL-MCNC: 0.99 MG/DL (ref 0.6–1)
DIFFERENTIAL METHOD BLD: ABNORMAL
EOSINOPHIL # BLD: 0.1 K/UL (ref 0–0.8)
EOSINOPHIL NFR BLD: 1 % (ref 0.5–7.8)
ERYTHROCYTE [DISTWIDTH] IN BLOOD BY AUTOMATED COUNT: 12.4 % (ref 11.9–14.6)
GLOBULIN SER CALC-MCNC: 4 G/DL (ref 2.3–3.5)
GLUCOSE SERPL-MCNC: 115 MG/DL (ref 65–100)
HCT VFR BLD AUTO: 39.7 % (ref 35.8–46.3)
HGB BLD-MCNC: 13.9 G/DL (ref 11.7–15.4)
LYMPHOCYTES # BLD: 1.5 K/UL (ref 0.5–4.6)
LYMPHOCYTES NFR BLD: 16 % (ref 13–44)
MCH RBC QN AUTO: 31.2 PG (ref 26.1–32.9)
MCHC RBC AUTO-ENTMCNC: 35 G/DL (ref 31.4–35)
MCV RBC AUTO: 89 FL (ref 79.6–97.8)
MONOCYTES # BLD: 0.9 K/UL (ref 0.1–1.3)
MONOCYTES NFR BLD: 9 % (ref 4–12)
NEUTS SEG # BLD: 6.8 K/UL (ref 1.7–8.2)
NEUTS SEG NFR BLD: 73 % (ref 43–78)
NRBC # BLD: 0 K/UL (ref 0–0.2)
PLATELET # BLD AUTO: 270 K/UL (ref 150–450)
PMV BLD AUTO: 10.4 FL (ref 10.8–14.1)
POTASSIUM SERPL-SCNC: 3.9 MMOL/L (ref 3.5–5.1)
PROT SERPL-MCNC: 7.8 G/DL (ref 6.3–8.2)
RBC # BLD AUTO: 4.46 M/UL (ref 4.05–5.25)
SODIUM SERPL-SCNC: 131 MMOL/L (ref 136–145)
WBC # BLD AUTO: 9.3 K/UL (ref 4.3–11.1)

## 2017-10-03 PROCEDURE — 80053 COMPREHEN METABOLIC PANEL: CPT | Performed by: INTERNAL MEDICINE

## 2017-10-03 PROCEDURE — 36415 COLL VENOUS BLD VENIPUNCTURE: CPT | Performed by: INTERNAL MEDICINE

## 2017-10-03 PROCEDURE — 85025 COMPLETE CBC W/AUTO DIFF WBC: CPT | Performed by: INTERNAL MEDICINE

## 2018-01-05 ENCOUNTER — HOSPITAL ENCOUNTER (OUTPATIENT)
Dept: LAB | Age: 73
Discharge: HOME | End: 2018-01-05
Payer: MEDICARE

## 2018-01-05 DIAGNOSIS — C50.412 MALIGNANT NEOPLASM OF UPPER-OUTER QUADRANT OF LEFT BREAST IN FEMALE, ESTROGEN RECEPTOR POSITIVE (HCC): ICD-10-CM

## 2018-01-05 DIAGNOSIS — Z17.0 MALIGNANT NEOPLASM OF UPPER-OUTER QUADRANT OF LEFT BREAST IN FEMALE, ESTROGEN RECEPTOR POSITIVE (HCC): ICD-10-CM

## 2018-01-05 PROBLEM — F33.9 RECURRENT DEPRESSION (HCC): Status: ACTIVE | Noted: 2018-01-05

## 2018-01-05 LAB
ALBUMIN SERPL-MCNC: 3.7 G/DL (ref 3.2–4.6)
ALBUMIN/GLOB SERPL: 0.9 {RATIO}
ALP SERPL-CCNC: 78 U/L (ref 50–136)
ALT SERPL-CCNC: 26 U/L (ref 12–65)
ANION GAP SERPL CALC-SCNC: 5 MMOL/L
AST SERPL-CCNC: 25 U/L (ref 15–37)
BASOPHILS # BLD: 0 K/UL (ref 0–0.2)
BASOPHILS NFR BLD: 0 % (ref 0–2)
BILIRUB SERPL-MCNC: 0.4 MG/DL (ref 0.2–1.1)
BUN SERPL-MCNC: 25 MG/DL (ref 8–23)
CALCIUM SERPL-MCNC: 9.8 MG/DL (ref 8.3–10.4)
CHLORIDE SERPL-SCNC: 98 MMOL/L (ref 98–107)
CO2 SERPL-SCNC: 33 MMOL/L (ref 21–32)
CREAT SERPL-MCNC: 1.1 MG/DL (ref 0.6–1)
DIFFERENTIAL METHOD BLD: ABNORMAL
EOSINOPHIL # BLD: 0.1 K/UL (ref 0–0.8)
EOSINOPHIL NFR BLD: 1 % (ref 0.5–7.8)
ERYTHROCYTE [DISTWIDTH] IN BLOOD BY AUTOMATED COUNT: 13.2 % (ref 11.9–14.6)
GLOBULIN SER CALC-MCNC: 4.1 G/DL
GLUCOSE SERPL-MCNC: 102 MG/DL (ref 65–100)
HCT VFR BLD AUTO: 39.7 % (ref 35.8–46.3)
HGB BLD-MCNC: 13.5 G/DL (ref 11.7–15.4)
LYMPHOCYTES # BLD: 1.7 K/UL (ref 0.5–4.6)
LYMPHOCYTES NFR BLD: 19 % (ref 13–44)
MCH RBC QN AUTO: 31.6 PG (ref 26.1–32.9)
MCHC RBC AUTO-ENTMCNC: 34 G/DL (ref 31.4–35)
MCV RBC AUTO: 93 FL (ref 79.6–97.8)
MONOCYTES # BLD: 0.7 K/UL (ref 0.1–1.3)
MONOCYTES NFR BLD: 8 % (ref 4–12)
NEUTS SEG # BLD: 6.6 K/UL (ref 1.7–8.2)
NEUTS SEG NFR BLD: 72 % (ref 43–78)
NRBC # BLD: 0 K/UL (ref 0–0.2)
NRBC BLD-RTO: 0 PER 100 WBC (ref 0–2)
PLATELET # BLD AUTO: 248 K/UL (ref 150–450)
PMV BLD AUTO: 10.6 FL (ref 10.8–14.1)
POTASSIUM SERPL-SCNC: 4.9 MMOL/L (ref 3.5–5.1)
PROT SERPL-MCNC: 7.8 G/DL (ref 6.3–8.2)
RBC # BLD AUTO: 4.27 M/UL (ref 4.05–5.25)
SODIUM SERPL-SCNC: 136 MMOL/L (ref 136–145)
WBC # BLD AUTO: 9.2 K/UL (ref 4.3–11.1)

## 2018-01-05 PROCEDURE — 80053 COMPREHEN METABOLIC PANEL: CPT | Performed by: INTERNAL MEDICINE

## 2018-01-05 PROCEDURE — 36415 COLL VENOUS BLD VENIPUNCTURE: CPT | Performed by: INTERNAL MEDICINE

## 2018-01-05 PROCEDURE — 85025 COMPLETE CBC W/AUTO DIFF WBC: CPT | Performed by: INTERNAL MEDICINE

## 2018-02-20 ENCOUNTER — APPOINTMENT (OUTPATIENT)
Dept: CT IMAGING | Age: 73
End: 2018-02-20
Attending: EMERGENCY MEDICINE
Payer: MEDICARE

## 2018-02-20 ENCOUNTER — HOSPITAL ENCOUNTER (EMERGENCY)
Age: 73
Discharge: HOME | End: 2018-02-20
Attending: EMERGENCY MEDICINE
Payer: MEDICARE

## 2018-02-20 VITALS
RESPIRATION RATE: 14 BRPM | HEART RATE: 70 BPM | BODY MASS INDEX: 30.39 KG/M2 | OXYGEN SATURATION: 98 % | SYSTOLIC BLOOD PRESSURE: 142 MMHG | WEIGHT: 178 LBS | DIASTOLIC BLOOD PRESSURE: 70 MMHG | HEIGHT: 64 IN | TEMPERATURE: 98.1 F

## 2018-02-20 DIAGNOSIS — F32.A CHRONIC DEPRESSION: ICD-10-CM

## 2018-02-20 DIAGNOSIS — R55 SYNCOPE AND COLLAPSE: Primary | ICD-10-CM

## 2018-02-20 DIAGNOSIS — N30.00 ACUTE CYSTITIS WITHOUT HEMATURIA: ICD-10-CM

## 2018-02-20 LAB
ALBUMIN SERPL-MCNC: 3.8 G/DL (ref 3.2–4.6)
ALBUMIN/GLOB SERPL: 1 {RATIO} (ref 1.2–3.5)
ALP SERPL-CCNC: 81 U/L (ref 50–136)
ALT SERPL-CCNC: 26 U/L (ref 12–65)
ANION GAP SERPL CALC-SCNC: 7 MMOL/L (ref 7–16)
AST SERPL-CCNC: 25 U/L (ref 15–37)
ATRIAL RATE: 63 BPM
BACTERIA URNS QL MICRO: ABNORMAL /HPF
BASOPHILS # BLD: 0 K/UL (ref 0–0.2)
BASOPHILS NFR BLD: 0 % (ref 0–2)
BILIRUB SERPL-MCNC: 0.4 MG/DL (ref 0.2–1.1)
BUN SERPL-MCNC: 22 MG/DL (ref 8–23)
CALCIUM SERPL-MCNC: 9.5 MG/DL (ref 8.3–10.4)
CALCULATED P AXIS, ECG09: 61 DEGREES
CALCULATED R AXIS, ECG10: 16 DEGREES
CALCULATED T AXIS, ECG11: 72 DEGREES
CASTS URNS QL MICRO: 0 /LPF
CHLORIDE SERPL-SCNC: 96 MMOL/L (ref 98–107)
CO2 SERPL-SCNC: 31 MMOL/L (ref 21–32)
CREAT SERPL-MCNC: 1.06 MG/DL (ref 0.6–1)
CRYSTALS URNS QL MICRO: 0 /LPF
DIAGNOSIS, 93000: NORMAL
DIFFERENTIAL METHOD BLD: ABNORMAL
EOSINOPHIL # BLD: 0 K/UL (ref 0–0.8)
EOSINOPHIL NFR BLD: 0 % (ref 0.5–7.8)
EPI CELLS #/AREA URNS HPF: ABNORMAL /HPF
ERYTHROCYTE [DISTWIDTH] IN BLOOD BY AUTOMATED COUNT: 13.1 % (ref 11.9–14.6)
GLOBULIN SER CALC-MCNC: 3.7 G/DL (ref 2.3–3.5)
GLUCOSE SERPL-MCNC: 141 MG/DL (ref 65–100)
HCT VFR BLD AUTO: 39.2 % (ref 35.8–46.3)
HGB BLD-MCNC: 13.6 G/DL (ref 11.7–15.4)
IMM GRANULOCYTES # BLD: 0 K/UL (ref 0–0.5)
IMM GRANULOCYTES NFR BLD AUTO: 0 % (ref 0–5)
LYMPHOCYTES # BLD: 1.4 K/UL (ref 0.5–4.6)
LYMPHOCYTES NFR BLD: 14 % (ref 13–44)
MCH RBC QN AUTO: 31.8 PG (ref 26.1–32.9)
MCHC RBC AUTO-ENTMCNC: 34.7 G/DL (ref 31.4–35)
MCV RBC AUTO: 91.6 FL (ref 79.6–97.8)
MONOCYTES # BLD: 0.4 K/UL (ref 0.1–1.3)
MONOCYTES NFR BLD: 4 % (ref 4–12)
MUCOUS THREADS URNS QL MICRO: 0 /LPF
NEUTS SEG # BLD: 8.2 K/UL (ref 1.7–8.2)
NEUTS SEG NFR BLD: 82 % (ref 43–78)
OTHER OBSERVATIONS,UCOM: ABNORMAL
P-R INTERVAL, ECG05: 150 MS
PLATELET # BLD AUTO: 238 K/UL (ref 150–450)
PMV BLD AUTO: 10.8 FL (ref 10.8–14.1)
POTASSIUM SERPL-SCNC: 3.6 MMOL/L (ref 3.5–5.1)
PROT SERPL-MCNC: 7.5 G/DL (ref 6.3–8.2)
Q-T INTERVAL, ECG07: 410 MS
QRS DURATION, ECG06: 88 MS
QTC CALCULATION (BEZET), ECG08: 419 MS
RBC # BLD AUTO: 4.28 M/UL (ref 4.05–5.25)
RBC #/AREA URNS HPF: ABNORMAL /HPF
SODIUM SERPL-SCNC: 134 MMOL/L (ref 136–145)
TROPONIN I SERPL-MCNC: <0.02 NG/ML (ref 0.02–0.05)
TROPONIN I SERPL-MCNC: <0.02 NG/ML (ref 0.02–0.05)
VENTRICULAR RATE, ECG03: 63 BPM
WBC # BLD AUTO: 10 K/UL (ref 4.3–11.1)
WBC URNS QL MICRO: ABNORMAL /HPF

## 2018-02-20 PROCEDURE — 70450 CT HEAD/BRAIN W/O DYE: CPT

## 2018-02-20 PROCEDURE — 85025 COMPLETE CBC W/AUTO DIFF WBC: CPT | Performed by: EMERGENCY MEDICINE

## 2018-02-20 PROCEDURE — 80053 COMPREHEN METABOLIC PANEL: CPT | Performed by: EMERGENCY MEDICINE

## 2018-02-20 PROCEDURE — 93005 ELECTROCARDIOGRAM TRACING: CPT | Performed by: EMERGENCY MEDICINE

## 2018-02-20 PROCEDURE — 74011250636 HC RX REV CODE- 250/636: Performed by: EMERGENCY MEDICINE

## 2018-02-20 PROCEDURE — 96360 HYDRATION IV INFUSION INIT: CPT | Performed by: EMERGENCY MEDICINE

## 2018-02-20 PROCEDURE — 84484 ASSAY OF TROPONIN QUANT: CPT | Performed by: EMERGENCY MEDICINE

## 2018-02-20 PROCEDURE — 99284 EMERGENCY DEPT VISIT MOD MDM: CPT | Performed by: EMERGENCY MEDICINE

## 2018-02-20 PROCEDURE — 81015 MICROSCOPIC EXAM OF URINE: CPT | Performed by: EMERGENCY MEDICINE

## 2018-02-20 RX ORDER — CEFDINIR 300 MG/1
300 CAPSULE ORAL 2 TIMES DAILY
Qty: 20 CAP | Refills: 0 | Status: SHIPPED | OUTPATIENT
Start: 2018-02-20 | End: 2018-03-02

## 2018-02-20 RX ORDER — SODIUM CHLORIDE 9 MG/ML
1000 INJECTION, SOLUTION INTRAVENOUS ONCE
Status: COMPLETED | OUTPATIENT
Start: 2018-02-20 | End: 2018-02-20

## 2018-02-20 RX ADMIN — SODIUM CHLORIDE 1000 ML: 900 INJECTION, SOLUTION INTRAVENOUS at 12:00

## 2018-02-20 NOTE — ED NOTES
I have reviewed discharge instructions with the patient. The patient verbalized understanding. Patient left ED via Discharge Method: ambulatory to Home with     Opportunity for questions and clarification provided. Patient given 1 scripts. To continue your aftercare when you leave the hospital, you may receive an automated call from our care team to check in on how you are doing. This is a free service and part of our promise to provide the best care and service to meet your aftercare needs.  If you have questions, or wish to unsubscribe from this service please call [phone]. Thank you for Choosing our [facility] Emergency Department.

## 2018-02-20 NOTE — ED TRIAGE NOTES
Pt states syncopal episode this morning. States fell from standing position- states head hit floor.  states pt did have LOC lasting 3-4 minutes. Pt states she does not take any blood thinners. Pt states back pain. Denies any chest pain or shortness of breath. Pt is alert and oriented x 4. Respirations are even and unlabored. Pt did have blood work completed on 2/14 as well as a urinalysis. Urine culture showed E-coli per the records.

## 2018-02-20 NOTE — DISCHARGE INSTRUCTIONS
Fainting: Care Instructions  Your Care Instructions    When you faint, or pass out, you lose consciousness for a short time. A brief drop in blood flow to the brain often causes it. When you fall or lie down, more blood flows to your brain and you regain consciousness. Emotional stress, pain, or overheating-especially if you have been standing-can make you faint. In these cases, fainting is usually not serious. But fainting can be a sign of a more serious problem. Your doctor may want you to have more tests to rule out other causes. The treatment you need depends on the reason why you fainted. The doctor has checked you carefully, but problems can develop later. If you notice any problems or new symptoms, get medical treatment right away. Follow-up care is a key part of your treatment and safety. Be sure to make and go to all appointments, and call your doctor if you are having problems. It's also a good idea to know your test results and keep a list of the medicines you take. How can you care for yourself at home? · Drink plenty of fluids to prevent dehydration. If you have kidney, heart, or liver disease and have to limit fluids, talk with your doctor before you increase your fluid intake. When should you call for help? Call 911 anytime you think you may need emergency care. For example, call if:  ? · You have symptoms of a heart problem. These may include:  ¨ Chest pain or pressure. ¨ Severe trouble breathing. ¨ A fast or irregular heartbeat. ¨ Lightheadedness or sudden weakness. ¨ Coughing up pink, foamy mucus. ¨ Passing out. After you call 911, the  may tell you to chew 1 adult-strength or 2 to 4 low-dose aspirin. Wait for an ambulance. Do not try to drive yourself. ? · You have symptoms of a stroke. These may include:  ¨ Sudden numbness, tingling, weakness, or loss of movement in your face, arm, or leg, especially on only one side of your body. ¨ Sudden vision changes.   ¨ Sudden trouble speaking. ¨ Sudden confusion or trouble understanding simple statements. ¨ Sudden problems with walking or balance. ¨ A sudden, severe headache that is different from past headaches. ? · You passed out (lost consciousness) again. ? Watch closely for changes in your health, and be sure to contact your doctor if:  ? · You do not get better as expected. Where can you learn more? Go to http://bryson-simone.info/. Enter M687 in the search box to learn more about \"Fainting: Care Instructions. \"  Current as of: March 20, 2017  Content Version: 11.4  © 2109-5139 [employer]. Care instructions adapted under license by [employer] (which disclaims liability or warranty for this information). If you have questions about a medical condition or this instruction, always ask your healthcare professional. [address] any warranty or liability for your use of this information. Urinary Tract Infection in Women: Care Instructions  Your Care Instructions    A urinary tract infection, or UTI, is a general term for an infection anywhere between the kidneys and the urethra (where urine comes out). Most UTIs are bladder infections. They often cause pain or burning when you urinate. UTIs are caused by bacteria and can be cured with antibiotics. Be sure to complete your treatment so that the infection goes away. Follow-up care is a key part of your treatment and safety. Be sure to make and go to all appointments, and call your doctor if you are having problems. It's also a good idea to know your test results and keep a list of the medicines you take. How can you care for yourself at home? · Take your antibiotics as directed. Do not stop taking them just because you feel better. You need to take the full course of antibiotics. · Drink extra water and other fluids for the next day or two.  This may help wash out the bacteria that are causing the infection. (If you have kidney, heart, or liver disease and have to limit fluids, talk with your doctor before you increase your fluid intake.)  · Avoid drinks that are carbonated or have caffeine. They can irritate the bladder. · Urinate often. Try to empty your bladder each time. · To relieve pain, take a hot bath or lay a heating pad set on low over your lower belly or genital area. Never go to sleep with a heating pad in place. To prevent UTIs  · Drink plenty of water each day. This helps you urinate often, which clears bacteria from your system. (If you have kidney, heart, or liver disease and have to limit fluids, talk with your doctor before you increase your fluid intake.)  · Urinate when you need to. · Urinate right after you have sex. · Change sanitary pads often. · Avoid douches, bubble baths, feminine hygiene sprays, and other feminine hygiene products that have deodorants. · After going to the bathroom, wipe from front to back. When should you call for help? Call your doctor now or seek immediate medical care if:  ? · Symptoms such as fever, chills, nausea, or vomiting get worse or appear for the first time. ? · You have new pain in your back just below your rib cage. This is called flank pain. ? · There is new blood or pus in your urine. ? · You have any problems with your antibiotic medicine. ? Watch closely for changes in your health, and be sure to contact your doctor if:  ? · You are not getting better after taking an antibiotic for 2 days. ? · Your symptoms go away but then come back. Where can you learn more? Go to http://bryson-simone.info/. Enter A584 in the search box to learn more about \"Urinary Tract Infection in Women: Care Instructions. \"  Current as of: May 12, 2017  Content Version: 11.4  © 6216-6420 [employer].  Care instructions adapted under license by [employer] (which disclaims liability or warranty for this information). If you have questions about a medical condition or this instruction, always ask your healthcare professional. [place] [address] any warranty or liability for your use of this information.

## 2018-02-20 NOTE — ED PROVIDER NOTES
HPI Comments: 80-year-old white female with history of depression presents with a syncopal episode this morning. She got up and went into her closet and then woke up on the floor several minutes later. Her  believes it could've lasted only about 3 minutes as he remembers hearing her get up and then she called him from another room after she had awakened and gone into this other rim. She denies any presyncopal symptoms. Denies any chest pain, trouble breathing, abdominal pain or palpitations. She denies any headache but says her head feels \"crazy\". She complains of some lightheadedness and dizziness. Denies any true vertigo type symptoms. Patient is a 71 y.o. female presenting with syncope. The history is provided by the patient. Syncope    This is a new problem. The current episode started 1 to 2 hours ago. Episode frequency: 1. The problem has been resolved. She lost consciousness for a period of 1 to 5 minutes. The problem is associated with standing up. Associated symptoms include light-headedness. Pertinent negatives include no visual change, no chest pain, no confusion, no fever, no abdominal pain, no nausea, no vomiting, no congestion, no headaches, no back pain and no weakness. She has tried nothing for the symptoms.         Past Medical History:   Diagnosis Date    Abdominal pain, right upper quadrant     Abnormal weight gain     Acute sinusitis, unspecified     Benign neoplasm of colon 2/25/2015    Benign paroxysmal positional vertigo 2/25/2015    Calculus of gallbladder without mention of cholecystitis or obstruction 2/25/2015    Depressive disorder, not elsewhere classified 2/25/2015    Dysuria     Essential hypertension, benign 2/25/2015    Insomnia, unspecified 2/25/2015    Nausea alone     Neoplasm of uncertain behavior of skin     Other malaise and fatigue     Primary localized osteoarthrosis, unspecified site 2/25/2015    Urinary tract infection, site not specified  Vaginitis and vulvovaginitis, unspecified 2/25/2015       Past Surgical History:   Procedure Laterality Date    HX BREAST LUMPECTOMY Right     benign per pt    HX BREAST LUMPECTOMY Left 5/5/2017    LEFT BREAST NEEDLE LOCALIZED LUMPECTOMY performed by Anai Jasmine [facility] at [facility]         Family History:   Problem Relation Age of Onset    Cancer Maternal Aunt      breast, great aunt    Cancer Maternal Grandmother      great grandmother, breast       Social History     Social History    Marital status:      Spouse name: N/A    Number of children: N/A    Years of education: N/A     Occupational History    Not on file. Social History Main Topics    Smoking status: Never Smoker    Smokeless tobacco: Never Used    Alcohol use No    Drug use: No    Sexual activity: Not on file     Other Topics Concern    Not on file     Social History Narrative         ALLERGIES: Penicillins    Review of Systems   Constitutional: Negative for chills and fever. HENT: Negative for congestion, rhinorrhea and sore throat. Eyes: Negative for photophobia and redness. Respiratory: Negative for cough and shortness of breath. Cardiovascular: Positive for syncope. Negative for chest pain and leg swelling. Gastrointestinal: Negative for abdominal pain, diarrhea, nausea and vomiting. Endocrine: Negative for polydipsia and polyuria. Genitourinary: Negative for dysuria, frequency and hematuria. Musculoskeletal: Negative for back pain and myalgias. Neurological: Positive for light-headedness. Negative for weakness, numbness and headaches. Psychiatric/Behavioral: Positive for dysphoric mood. Negative for confusion and suicidal ideas. Vitals:    02/20/18 1032   BP: 138/79   Pulse: 68   Resp: 18   Temp: 98.1 °F (36.7 °C)   SpO2: 100%   Weight: 80.7 kg (178 lb)   Height: 5' 4\" (1.626 m)            Physical Exam   Constitutional: She is oriented to person, place, and time.  She appears well-developed and well-nourished. Eyes: Conjunctivae are normal. Pupils are equal, round, and reactive to light. Neck: Normal range of motion. Neck supple. Cardiovascular: Normal rate, regular rhythm, normal heart sounds and intact distal pulses. No murmur heard. Pulmonary/Chest: Breath sounds normal. No respiratory distress. Abdominal: Soft. She exhibits no distension. There is no tenderness. There is no rebound and no guarding. Musculoskeletal: Normal range of motion. She exhibits no edema or tenderness. Neurological: She is alert and oriented to person, place, and time. She has normal strength. No cranial nerve deficit or sensory deficit. She exhibits normal muscle tone. Coordination normal.   Skin: Skin is warm and dry. Nursing note and vitals reviewed. MDM  Number of Diagnoses or Management Options  Diagnosis management comments: Unexplained syncope. Arrhythmia versus vasovagal.  No chest pain trouble breathing or abdominal pain to suggest MI, PE, aortic dissection. CT head to rule out intracranial hemorrhage given  Her lack of memory for the event and is well also due to dizziness and lightheadedness and symptoms of \"craziness\" in her head. Patient denies suicidal plan or ideation. 12:37 PM  Urine culture reveals susceptibility. Place on Omnicef third-generation cephalosporin.        Amount and/or Complexity of Data Reviewed  Clinical lab tests: ordered and reviewed  Tests in the radiology section of CPT®: ordered and reviewed          ED Course       Procedures

## 2018-03-16 ENCOUNTER — HOSPITAL ENCOUNTER (OUTPATIENT)
Dept: MAMMOGRAPHY | Age: 73
Discharge: HOME | End: 2018-03-16
Attending: NURSE PRACTITIONER
Payer: MEDICARE

## 2018-03-16 DIAGNOSIS — C50.912 MALIGNANT NEOPLASM OF LEFT FEMALE BREAST (HCC): ICD-10-CM

## 2018-03-16 PROCEDURE — 77066 DX MAMMO INCL CAD BI: CPT

## 2018-03-21 ENCOUNTER — HOSPITAL ENCOUNTER (OUTPATIENT)
Dept: RADIATION ONCOLOGY | Age: 73
Discharge: HOME | End: 2018-03-21
Payer: MEDICARE

## 2018-03-21 VITALS
SYSTOLIC BLOOD PRESSURE: 119 MMHG | HEART RATE: 80 BPM | RESPIRATION RATE: 16 BRPM | TEMPERATURE: 97.7 F | DIASTOLIC BLOOD PRESSURE: 60 MMHG | WEIGHT: 175 LBS | OXYGEN SATURATION: 95 % | HEIGHT: 64 IN | BODY MASS INDEX: 29.88 KG/M2

## 2018-03-21 PROCEDURE — 99211 OFF/OP EST MAY X REQ PHY/QHP: CPT

## 2018-03-21 NOTE — PROGRESS NOTES
Patient: Hawa Cartwright MRN: [id number]  SSN: [id number]    YOB: 1945  Age: 71 y.o. Sex: female      Other Providers:  Shantanu Sheikh MD.      CHIEF COMPLAINT: Breast cancer. DIAGNOSIS: Left breast invasive ductal carcinoma, [id number], Stage IA. ER 97%, IL 86%, HER2 Positive. PREVIOUS TREATMENT:  1) 5/5/17:  Left breast lumpectomy. 2) Radiation of the left breast. DOSE and FRACTIONATION:  16 fractions, 4256 cGy, 5 boost.        Treatment date: 07/18 through 08/16/17    HISTORY OF PRESENT ILLNESS:  Hawa Cartwright is a 71 y.o. female initially seen by Dr. Marnie Srinivasan at the request of Dr. Cammie Valle. She previously completed routine yearly mammograms. She missed her mammogram in 2016 and was found to have a left breast mass by mammogram on 3/25/2017. The area was not palpable, and there were no associated breast changes including nipple discharge or skin change. She was sent for an ultrasound guided biopsy on 4/10/2017 with pathology positive for breast cancer, highly ER/IL positive and HER2 positive. She was then referred to Dr. Ellen Kamara and underwent a left breast needle localized lumpectomy with sentinel lymph node biopsy on 5/5/2017, which showed the tumor to be 1.1 cm with 2 negative sentinel nodes. She was seen in medical oncology 6/20/2017. she was interested in their opinion but was very reluctant after medical oncology recommended chemotherapy. She wanted time to think about her options and was referred to us for consideration of radiation. INTERVAL HISTORY: Ms Flores Hester returns today for follow up 7 months after completing radiation therapy for her left breast invasive ductal carcinoma. She tolerated radiation well. She denies any lingering side effects from her previous radiation therapy. Her energy is on the low side, most likely due to her history of chronic depression.  She reports that she is back on medication and is followed by her psychiatrist. She denies any suicide thoughts. She is supported by her , family members and friends. OBSTETRIC HISTORY:    Menarche at the age of 7.    G1,P2. Oral Contraceptive Pills:  9-10 months. Hormone Replacement Therapy:  None  Menopause at 63    PAST MEDICAL HISTORY:    Past Medical History:   Diagnosis Date    Abdominal pain, right upper quadrant     Abnormal weight gain     Acute sinusitis, unspecified     Benign neoplasm of colon 2/25/2015    Benign paroxysmal positional vertigo 2/25/2015    Breast cancer ([facility] [address] 05/2017    left    Calculus of gallbladder without mention of cholecystitis or obstruction 2/25/2015    Depressive disorder, not elsewhere classified 2/25/2015    Dysuria     Essential hypertension, benign 2/25/2015    Insomnia, unspecified 2/25/2015    Nausea alone     Neoplasm of uncertain behavior of skin     Other malaise and fatigue     Primary localized osteoarthrosis, unspecified site 2/25/2015    Radiation therapy complication     Urinary tract infection, site not specified     Vaginitis and vulvovaginitis, unspecified 2/25/2015       The patient denies history of collagen vascular diseases, pacemaker insertion, prior radiation or prior chemotherapy. PAST SURGICAL HISTORY:   Past Surgical History:   Procedure Laterality Date    HX BREAST LUMPECTOMY Right     benign per pt    HX BREAST LUMPECTOMY Left 5/5/2017    LEFT BREAST NEEDLE LOCALIZED LUMPECTOMY performed by Nancee Phoenix, MD [facility] [address]:     Current Outpatient Prescriptions:     fluvoxaMINE (LUVOX) 50 mg tablet, TAKE 1 TABLET BY MOUTH EVERY EVENING, Disp: , Rfl: 1    atenolol-chlorthalidone (TENORETIC) 50-25 mg per tablet, Take 0.5 Tabs by mouth daily. , Disp: 90 Tab, Rfl: 3    anastrozole (ARIMIDEX) 1 mg tablet, Take 1 Tab by mouth daily. Start medication after radiation is completed. , Disp: 30 Tab, Rfl: 5    clonazePAM (KLONOPIN) 1 mg tablet, , Disp: , Rfl:     QUEtiapine (SEROQUEL) 50 mg tablet, TAKE 3 TABLETS BY MOUTH AT BEDTIME, Disp: , Rfl: 2    temazepam (RESTORIL) 30 mg capsule, Take  by mouth nightly as needed for Sleep., Disp: , Rfl:     ALLERGIES:   Allergies   Allergen Reactions    Penicillins Other (comments)     unknown       SOCIAL HISTORY:   Social History     Social History    Marital status:      Spouse name: N/A    Number of children: N/A    Years of education: N/A     Occupational History    Not on file. Social History Main Topics    Smoking status: Never Smoker    Smokeless tobacco: Never Used    Alcohol use No    Drug use: No    Sexual activity: Not on file     Other Topics Concern    Not on file     Social History Narrative       FAMILY HISTORY:   Family History   Problem Relation Age of Onset    Cancer Maternal Aunt      breast, great aunt    Cancer Maternal Grandmother      great grandmother, breast    Breast Cancer Maternal Grandmother        REVIEW OF SYSTEMS: Please see the completed review of systems sheet in the chart that I have reviewed today. PHYSICAL EXAMINATION:   ECOG Performance status 0  VITAL SIGNS: blood pressure 119/60   Pulse  80  Temperature  97.7  Weight  175       GENERAL: The patient is well-developed, ambulatory, alert and in no acute distress. HEENT: Head is normocephalic, atraumatic. NECK: Neck is supple with no masses. PATHOLOGY:       DIAGNOSIS   A: LEFT BREAST TISSUE: INFILTRATING DUCT CARCINOMA WITH LOBULAR FEATURES, LOW GRADE (WELL DIFFERENTIATED) MEASURING APPROXIMATELY 1.1 X 1 X 0.7 CM IN ASSOCIATION WITH DUCTAL CARCINOMA IN SITU, INTERMEDIATE GRADE (CRIBRIFORM TYPE). INFILTRATING CARCINOMA IS APPROXIMATELY 0.1 CN FROM MARKED SUPERIOR MARGIN. A MICROSCOPIC FOCUS OF TUMOR MOST LIKELY REPRESENTING INTRAMAMMARY LYMPHATIC SPREAD IS PRESENT, APPROXIMATELY 0.3 CM FROM MARKED ANTERIOR MARGIN. OTHER MARGINS ARE GREATER THAN 1 CM FROM TUMOR. SEE COMMENT. B: LEFT SENTINEL LYMPH NODE: LYMPH NODE NEGATIVE FOR CARCINOMA.    C: LEFT SENTINEL NODE #2: LYMPH NODE NEGATIVE FOR METASTATIC CARCINOMA. Comment   Infiltrating carcinoma in this specimen is similar to that in prior core biopsy [id number] which has estrogen receptors of 97%, progesterone receptors of 86% and Her2 3+ positive. If clinically indicated receptor studies can be repeated in this material      A: ANATOMIC SITE: Left breast (presumably 2 o'clock position, 4 cm from nipple based on prior core biopsy). PROCEDURE: Needle localized wide excision. HISTOLOGIC TYPE: Infiltrating duct with lobular features. SIZE: Approximately 1.1 x 1 x 0.7 cm. UNIFOCAL OR MULTIFOCAL: Apparently unifocal with intramammary lymphatic focus. PRESENCE OF SKIN, NIPPLE OR SKELETAL MUSCLE INVOLVEMENT: Not identified. OLAYINKA MODIFICATION OF BLOOM-MAK GRADE:   ARCHITECTURAL SCORE: 2/3. NUCLEAR SCORE: 1/3. MITOTIC SCORE: 1/3. TOTAL SCORE: 4/9 = Low Grade. 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 LYMPHOVASCULAR INVASION: Apparent focus of intramammary lymphatic spread. ARE MICROCALCIFICATIONS IDENTIFIED: No.   TUMOR DISTANCE TO CLOSEST MARGIN: 0.1 cm from superior margin. ANTERIOR (SUPERFICIAL): Greater than 1 cm. POSTERIOR (DEEP): Greater than 1 cm. MEDIAL: Greater than 1 cm. LATERAL: Greater than 1 cm. INFERIOR: Approximately 0.3 cm (separate 1 mm focus away from primary). SUPERIOR: Infiltrating tumor is present approximately 0.1 from marked superior margin. LYMPH NODE STATUS:   TOTAL NUMBER OF LYMPH NODES CONTAINING CARCINOMA: 0.   TOTAL NUMBER OF LYMPH NODES EXAMINED: 2 (see B and C). METASTASIS:   SIZE OF LARGEST POSITIVE NODE: Does not apply. EXTRA-CAPSULAR EXTENSION OF TUMOR: Does not apply. OTHER FINDINGS: Changes consistent with prior biopsy site. TNM CLASSIFICATION: pT1 pN0. COLD ISCHEMIC TIME: 1 hour, 42 minutes. TOTAL TIME IN FORMALIN: 60 hours, 15 minutes.    BLOCK NOT SENT BECAUSE RECEPTOR STUDIES PERFORMED ON PRIOR CORE BIOPSY (SEE [id number] FOR ESTROGEN RECEPTORS OF 97%, PROGESTERONE RECEPTOR STUDY A 86% AND HER2/3+ POSITIVE). LABORATORY: none today    RADIOLOGY:    DIAGNOSTIC DIGITAL BILATERAL MAMMOGRAPHY  3/16/2018     CLINICAL INDICATION: Annual bilateral mammogram, surveillance of left breast  cancer status post lumpectomy and radiation, surgery on 5/5/2017, patient  reports her maternal grandmother had breast cancer     COMPARISON: 5/5/2017, also prior outside mammography going back to 5/22/2008  from [facility]; breast MRI 4/19/2017      FINDINGS:      Mammogram: Digital diagnostic mammography was performed, and is interpreted in  conjunction with a computer assisted detection (CAD) system.       Standard views demonstrate scattered glandular densities bilaterally. There are  minimal typically benign calcifications on the left anteriorly, with no  suspicious cluster on either side. There is mild expected linear scarring at the  left 2:00 lumpectomy site. No suspicious nonsurgical distortion, developing  mass, nipple retraction or unexpected skin thickening is seen on either side.     IMPRESSION:   1. No evidence of residual or recurrent malignancy in either breast.  2. Left expected lumpectomy and radiation change.     Recommend screening mammography in one year unless other imaging is clinically  warranted in the interval. A reminder letter will be scheduled. This was  reported to the patient at the time of interpretation.     BI-RADS Assessment Category 2: Benign finding     IMPRESSION/PLAN:  Miryam Polk is a 71 y.o. female with left breast invasive ductal carcinoma, [id number], Stage IA. ER 97%, CO 86%, HER2 Positive S/P left breast lumpectomy, 05/05/17. She completed radiation therapy, 08/16/17. She declined chemotherapy as recommended by medical oncology, but agreed to endocrine therapy and is tolerating well.  DEXA, 7/12/17 was normal.     PLAN:  1) She is now 7 months after completing radiation and is recovering as anticipated. Mammogram, 3/16/18 shows no evidence for malignancy. Continue annual mammograms  2) Endocrine therapy as directed by Dr. Aidan Boogie, [facility]. She is scheduled with Dr. Aidan Boogie in July for follow up. 3) Continue with calcium and vitamin D supplementation. Repeat DEXA, 7/2019  4) I spent 20 minutes in direct counseling and ongoing management of her breast cancer. In regards to her clinical depression, she will continue follow up with psychiatry. I will see her again in 6 months. All questions were answered to the best of my ability. Shiva Fuentes NP   March 21, 2018       Portions of this note were copied from prior encounters and reviewed for accuracy, currency, and represent documentation and tasks completed during this encounter. I verify and attest these portions to be unchanged from prior visits.

## 2018-03-21 NOTE — PROGRESS NOTES
6 month f/u left breast cancer. S/p Lumpectomy 5-5-17. RT end 8-16-17. Taking Arimidex. Elevated distress screening. Pt is not suicidal. Receiving shock therapy. Therapist is following pt. Bone density 7/17. Mammogram 3-16-18. F/u with Dr. Johnny Guzman on 7-9-18.     Laurie Cope RN

## 2018-07-09 ENCOUNTER — HOSPITAL ENCOUNTER (OUTPATIENT)
Dept: LAB | Age: 73
Discharge: HOME | End: 2018-07-09
Payer: MEDICARE

## 2018-07-09 DIAGNOSIS — C50.412 MALIGNANT NEOPLASM OF UPPER-OUTER QUADRANT OF LEFT BREAST IN FEMALE, ESTROGEN RECEPTOR POSITIVE (HCC): ICD-10-CM

## 2018-07-09 DIAGNOSIS — Z17.0 MALIGNANT NEOPLASM OF UPPER-OUTER QUADRANT OF LEFT BREAST IN FEMALE, ESTROGEN RECEPTOR POSITIVE (HCC): ICD-10-CM

## 2018-07-09 LAB
ALBUMIN SERPL-MCNC: 3.7 G/DL (ref 3.2–4.6)
ALBUMIN/GLOB SERPL: 1.1 {RATIO} (ref 1.2–3.5)
ALP SERPL-CCNC: 69 U/L (ref 50–136)
ALT SERPL-CCNC: 20 U/L (ref 12–65)
ANION GAP SERPL CALC-SCNC: 7 MMOL/L (ref 7–16)
AST SERPL-CCNC: 20 U/L (ref 15–37)
BASOPHILS # BLD: 0 K/UL (ref 0–0.2)
BASOPHILS NFR BLD: 1 % (ref 0–2)
BILIRUB SERPL-MCNC: 0.3 MG/DL (ref 0.2–1.1)
BUN SERPL-MCNC: 28 MG/DL (ref 8–23)
CALCIUM SERPL-MCNC: 9.4 MG/DL (ref 8.3–10.4)
CHLORIDE SERPL-SCNC: 101 MMOL/L (ref 98–107)
CO2 SERPL-SCNC: 27 MMOL/L (ref 21–32)
CREAT SERPL-MCNC: 1.04 MG/DL (ref 0.6–1)
DIFFERENTIAL METHOD BLD: ABNORMAL
EOSINOPHIL # BLD: 0.4 K/UL (ref 0–0.8)
EOSINOPHIL NFR BLD: 7 % (ref 0.5–7.8)
ERYTHROCYTE [DISTWIDTH] IN BLOOD BY AUTOMATED COUNT: 13.2 % (ref 11.9–14.6)
GLOBULIN SER CALC-MCNC: 3.4 G/DL (ref 2.3–3.5)
GLUCOSE SERPL-MCNC: 79 MG/DL (ref 65–100)
HCT VFR BLD AUTO: 35.1 % (ref 35.8–46.3)
HGB BLD-MCNC: 11.9 G/DL (ref 11.7–15.4)
LYMPHOCYTES # BLD: 1.5 K/UL (ref 0.5–4.6)
LYMPHOCYTES NFR BLD: 26 % (ref 13–44)
MCH RBC QN AUTO: 31.4 PG (ref 26.1–32.9)
MCHC RBC AUTO-ENTMCNC: 33.9 G/DL (ref 31.4–35)
MCV RBC AUTO: 92.6 FL (ref 79.6–97.8)
MONOCYTES # BLD: 0.5 K/UL (ref 0.1–1.3)
MONOCYTES NFR BLD: 9 % (ref 4–12)
NEUTS SEG # BLD: 3.3 K/UL (ref 1.7–8.2)
NEUTS SEG NFR BLD: 57 % (ref 43–78)
NRBC # BLD: 0 K/UL (ref 0–0.2)
PLATELET # BLD AUTO: 235 K/UL (ref 150–450)
PMV BLD AUTO: 10.5 FL (ref 10.8–14.1)
POTASSIUM SERPL-SCNC: 4.1 MMOL/L (ref 3.5–5.1)
PROT SERPL-MCNC: 7.1 G/DL (ref 6.3–8.2)
RBC # BLD AUTO: 3.79 M/UL (ref 4.05–5.25)
SODIUM SERPL-SCNC: 135 MMOL/L (ref 136–145)
WBC # BLD AUTO: 5.8 K/UL (ref 4.3–11.1)

## 2018-07-09 PROCEDURE — 80053 COMPREHEN METABOLIC PANEL: CPT | Performed by: INTERNAL MEDICINE

## 2018-07-09 PROCEDURE — 36415 COLL VENOUS BLD VENIPUNCTURE: CPT | Performed by: INTERNAL MEDICINE

## 2018-07-09 PROCEDURE — 85025 COMPLETE CBC W/AUTO DIFF WBC: CPT | Performed by: INTERNAL MEDICINE

## 2018-09-19 ENCOUNTER — APPOINTMENT (OUTPATIENT)
Dept: RADIATION ONCOLOGY | Age: 73
End: 2018-09-19

## 2018-10-31 ENCOUNTER — APPOINTMENT (OUTPATIENT)
Dept: RADIATION ONCOLOGY | Age: 73
End: 2018-10-31

## 2018-11-21 ENCOUNTER — HOSPITAL ENCOUNTER (OUTPATIENT)
Dept: RADIATION ONCOLOGY | Age: 73
Discharge: HOME | End: 2018-11-21
Payer: MEDICARE

## 2018-11-21 VITALS
OXYGEN SATURATION: 97 % | DIASTOLIC BLOOD PRESSURE: 73 MMHG | WEIGHT: 196 LBS | BODY MASS INDEX: 33.64 KG/M2 | TEMPERATURE: 97.8 F | HEART RATE: 64 BPM | SYSTOLIC BLOOD PRESSURE: 125 MMHG

## 2018-11-21 PROCEDURE — 99211 OFF/OP EST MAY X REQ PHY/QHP: CPT

## 2018-11-21 RX ORDER — LYSINE HCL 500 MG
2 TABLET ORAL DAILY
COMMUNITY
End: 2020-01-20 | Stop reason: ALTCHOICE

## 2018-11-21 NOTE — PROGRESS NOTES
Patient: Damion Lopes MRN: [id number]  SSN: [id number] YOB: 1945  Age: 68 y.o. Sex: female Other Providers:  Jennifer Craig MD.   
 
CHIEF COMPLAINT: Breast cancer. DIAGNOSIS: Left breast invasive ductal carcinoma, [id number], Stage IA. ER 97%, VA 86%, HER2 Positive. PREVIOUS TREATMENT: 
1) 5/5/17:  Left breast lumpectomy. 2) Radiation of the left breast. DOSE and FRACTIONATION:  16 fractions, 4256 cGy, 5 boost.   
    Treatment date: 07/18 through 08/16/17 HISTORY OF PRESENT ILLNESS:  Damion Lopes is a 68 y.o. female initially seen by Dr. Vidya Piper at the request of Dr. Sang Haskins. She previously completed routine yearly mammograms. She missed her mammogram in 2016 and was found to have a left breast mass by mammogram on 3/25/2017. The area was not palpable, and there were no associated breast changes including nipple discharge or skin change. She was sent for an ultrasound guided biopsy on 4/10/2017 with pathology positive for breast cancer, highly ER/VA positive and HER2 positive. She was then referred to Dr. Anna Curtis and underwent a left breast needle localized lumpectomy with sentinel lymph node biopsy on 5/5/2017, which showed the tumor to be 1.1 cm with 2 negative sentinel nodes. She was seen in medical oncology 6/20/2017. she was interested in their opinion but was very reluctant after medical oncology recommended chemotherapy. She wanted time to think about her options and was referred to us for consideration of radiation. INTERVAL HISTORY: Ms Opal Poon returns today for follow up 15 months after completing radiation therapy for her left breast invasive ductal carcinoma. She tolerated radiation well. She denies any lingering side effects from her previous radiation therapy. Tolerating Arimidex well. Now on Wellbutrin for history of depression and is doing extremely well. OBSTETRIC HISTORY:   
Menarche at the age of 7.   
G1,P2. Oral Contraceptive Pills:  9-10 months. Hormone Replacement Therapy:  None Menopause at 63 PAST MEDICAL HISTORY:   
Past Medical History:  
Diagnosis Date  Abdominal pain, right upper quadrant  Abnormal weight gain  Acute sinusitis, unspecified  Benign neoplasm of colon 2/25/2015  Benign paroxysmal positional vertigo 2/25/2015  Breast cancer ([facility] [address] 05/2017  
 left  Calculus of gallbladder without mention of cholecystitis or obstruction 2/25/2015  Depressive disorder, not elsewhere classified 2/25/2015  Dysuria  Essential hypertension, benign 2/25/2015  Insomnia, unspecified 2/25/2015  Nausea alone  Neoplasm of uncertain behavior of skin  Other malaise and fatigue  Primary localized osteoarthrosis, unspecified site 2/25/2015  Radiation therapy complication  Urinary tract infection, site not specified  Vaginitis and vulvovaginitis, unspecified 2/25/2015 The patient denies history of collagen vascular diseases, pacemaker insertion, prior radiation or prior chemotherapy. PAST SURGICAL HISTORY:  
Past Surgical History:  
Procedure Laterality Date  HX BREAST LUMPECTOMY Right   
 benign per pt MEDICATIONS:  
 
Current Outpatient Medications:  
  anastrozole (ARIMIDEX) 1 mg tablet, Take 1 Tab by mouth daily. Start medication after radiation is completed. , Disp: 90 Tab, Rfl: 3 
  atenolol-chlorthalidone (TENORETIC) 50-25 mg per tablet, Take 0.5 Tabs by mouth daily. , Disp: 90 Tab, Rfl: 3 
  fluvoxaMINE (LUVOX) 50 mg tablet, TAKE 1 TABLET BY MOUTH EVERY EVENING, Disp: , Rfl: 1   clonazePAM (KLONOPIN) 1 mg tablet, , Disp: , Rfl:  
  QUEtiapine (SEROQUEL) 50 mg tablet, TAKE 3 TABLETS BY MOUTH AT BEDTIME, Disp: , Rfl: 2 
  temazepam (RESTORIL) 30 mg capsule, Take  by mouth nightly as needed for Sleep., Disp: , Rfl: ALLERGIES:  
Allergies Allergen Reactions  Penicillins Other (comments)  
  unknown SOCIAL HISTORY:  
 Social History Socioeconomic History  Marital status:  Spouse name: Not on file  Number of children: Not on file  Years of education: Not on file  Highest education level: Not on file Social Needs  Financial resource strain: Not on file  Food insecurity - worry: Not on file  Food insecurity - inability: Not on file  Transportation needs - medical: Not on file  Transportation needs - non-medical: Not on file Occupational History  Not on file Tobacco Use  Smoking status: Never Smoker  Smokeless tobacco: Never Used Substance and Sexual Activity  Alcohol use: No  
 Drug use: No  
 Sexual activity: Not on file Other Topics Concern  Not on file Social History Narrative  Not on file FAMILY HISTORY:  
Family History Problem Relation Age of Onset  Cancer Maternal Aunt   
     breast, great aunt  Cancer Maternal Grandmother   
     great grandmother, breast  
 Breast Cancer Maternal Grandmother REVIEW OF SYSTEMS: Please see the completed review of systems sheet in the chart that I have reviewed today. PHYSICAL EXAMINATION:  
ECOG Performance status 0 
VITAL SIGNS: blood pressure  125/73 Pulse 64 Temperature  97.8 Weight  196 GENERAL: The patient is well-developed, ambulatory, alert and in no acute distress. HEENT: Head is normocephalic, atraumatic. NECK: Neck is supple with no masses. BREASTS: Examination of the unaffected breast reveals no dominant nodules or masses. The lumpectomy cavity appears well healed with good aesthetics and symmetry. Well healed surgical incision. PATHOLOGY:   
 
 DIAGNOSIS  
A: LEFT BREAST TISSUE: INFILTRATING DUCT CARCINOMA WITH LOBULAR FEATURES, LOW GRADE (WELL DIFFERENTIATED) MEASURING APPROXIMATELY 1.1 X 1 X 0.7 CM IN ASSOCIATION WITH DUCTAL CARCINOMA IN SITU, INTERMEDIATE GRADE (CRIBRIFORM TYPE).  INFILTRATING CARCINOMA IS APPROXIMATELY 0.1 CN FROM MARKED SUPERIOR MARGIN. A MICROSCOPIC FOCUS OF TUMOR MOST LIKELY REPRESENTING INTRAMAMMARY LYMPHATIC SPREAD IS PRESENT, APPROXIMATELY 0.3 CM FROM MARKED ANTERIOR MARGIN. OTHER MARGINS ARE GREATER THAN 1 CM FROM TUMOR. SEE COMMENT. B: LEFT SENTINEL LYMPH NODE: LYMPH NODE NEGATIVE FOR CARCINOMA. C: LEFT SENTINEL NODE #2: LYMPH NODE NEGATIVE FOR METASTATIC CARCINOMA. Comment Infiltrating carcinoma in this specimen is similar to that in prior core biopsy [id number] which has estrogen receptors of 97%, progesterone receptors of 86% and Her2 3+ positive. If clinically indicated receptor studies can be repeated in this material  
 
 A: ANATOMIC SITE: Left breast (presumably 2 o'clock position, 4 cm from nipple based on prior core biopsy). PROCEDURE: Needle localized wide excision. HISTOLOGIC TYPE: Infiltrating duct with lobular features. SIZE: Approximately 1.1 x 1 x 0.7 cm. UNIFOCAL OR MULTIFOCAL: Apparently unifocal with intramammary lymphatic focus. PRESENCE OF SKIN, NIPPLE OR SKELETAL MUSCLE INVOLVEMENT: Not identified. OLAYINKA MODIFICATION OF BLOOM-MAK GRADE:  
ARCHITECTURAL SCORE: 2/3. NUCLEAR SCORE: 1/3. MITOTIC SCORE: 1/3. TOTAL SCORE: 4/9 = Low Grade. 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 LYMPHOVASCULAR INVASION: Apparent focus of intramammary lymphatic spread. ARE MICROCALCIFICATIONS IDENTIFIED: No.  
TUMOR DISTANCE TO CLOSEST MARGIN: 0.1 cm from superior margin. ANTERIOR (SUPERFICIAL): Greater than 1 cm. POSTERIOR (DEEP): Greater than 1 cm. MEDIAL: Greater than 1 cm. LATERAL: Greater than 1 cm. INFERIOR: Approximately 0.3 cm (separate 1 mm focus away from primary). SUPERIOR: Infiltrating tumor is present approximately 0.1 from marked superior margin. LYMPH NODE STATUS:  
TOTAL NUMBER OF LYMPH NODES CONTAINING CARCINOMA: 0.  
TOTAL NUMBER OF LYMPH NODES EXAMINED: 2 (see B and C).   
METASTASIS:  
 SIZE OF LARGEST POSITIVE NODE: Does not apply. EXTRA-CAPSULAR EXTENSION OF TUMOR: Does not apply. OTHER FINDINGS: Changes consistent with prior biopsy site. TNM CLASSIFICATION: pT1 pN0. COLD ISCHEMIC TIME: 1 hour, 42 minutes. TOTAL TIME IN FORMALIN: 60 hours, 15 minutes. BLOCK NOT SENT BECAUSE RECEPTOR STUDIES PERFORMED ON PRIOR CORE BIOPSY (SEE [id number] FOR ESTROGEN RECEPTORS OF 97%, PROGESTERONE RECEPTOR STUDY A 86% AND HER2/3+ POSITIVE). LABORATORY: none today RADIOLOGY:   
DIAGNOSTIC DIGITAL BILATERAL MAMMOGRAPHY  3/16/2018 
  
CLINICAL INDICATION: Annual bilateral mammogram, surveillance of left breast 
cancer status post lumpectomy and radiation, surgery on 5/5/2017, patient 
reports her maternal grandmother had breast cancer 
  
COMPARISON: 5/5/2017, also prior outside mammography going back to 5/22/2008 
from [facility]; breast MRI 4/19/2017  
  
FINDINGS:  
  
Mammogram: Digital diagnostic mammography was performed, and is interpreted in 
conjunction with a computer assisted detection (CAD) system.   
  
Standard views demonstrate scattered glandular densities bilaterally. There are 
minimal typically benign calcifications on the left anteriorly, with no 
suspicious cluster on either side. There is mild expected linear scarring at the 
left 2:00 lumpectomy site. No suspicious nonsurgical distortion, developing 
mass, nipple retraction or unexpected skin thickening is seen on either side. 
  
IMPRESSION:  
1. No evidence of residual or recurrent malignancy in either breast. 
2. Left expected lumpectomy and radiation change. 
  
Recommend screening mammography in one year unless other imaging is clinically 
warranted in the interval. A reminder letter will be scheduled.   This was 
reported to the patient at the time of interpretation. 
  
BI-RADS Assessment Category 2: Benign finding 
  
IMPRESSION/PLAN:  Cristobal Sprague is a 68 y.o. female with left breast invasive ductal carcinoma, [id number], Stage IA. ER 97%, KS 86%, HER2 Positive S/P left breast lumpectomy, 05/05/17. She completed radiation therapy, 08/16/17. She declined chemotherapy as recommended by medical oncology, but agreed to endocrine therapy and is tolerating well. DEXA, 7/12/17 was normal. Negative mammogram, 3/16/18. PLAN: 
1) Negative mammogram, 3/16/2018. Annual scheduled for 3/19/2019 
2) Endocrine therapy as directed by Dr. Adia Yoon, [facility]. 3) Continue with calcium and vitamin D supplementation. Repeat DEXA, 7/2019 4) I spent 20 minutes in direct counseling and ongoing management of her breast cancer. I will see her again in 12 months. All questions were answered to the best of my ability. Carlie Villafana NP November 21, 2018 Portions of this note were copied from prior encounters and reviewed for accuracy, currency, and represent documentation and tasks completed during this encounter. I verify and attest these portions to be unchanged from prior visits.

## 2018-11-21 NOTE — NURSE NAVIGATOR
F/u left breast cancer. Lumpectomy 5-5-17. RT end 8-16-17. Mammogram 3-16-18. Mammogram scheduled 3-19-19. Taking Arimidex. Taking calcium/vitamin D. Nia Yanes RN

## 2018-11-26 PROBLEM — M54.50 LOW BACK PAIN: Status: ACTIVE | Noted: 2018-11-26

## 2018-11-26 PROBLEM — R63.4 WEIGHT LOSS: Status: RESOLVED | Noted: 2017-08-14 | Resolved: 2018-11-26

## 2018-11-26 PROBLEM — C50.912 BREAST CANCER, LEFT BREAST (HCC): Status: RESOLVED | Noted: 2017-04-25 | Resolved: 2018-11-26

## 2019-01-09 ENCOUNTER — HOSPITAL ENCOUNTER (OUTPATIENT)
Dept: LAB | Age: 74
Discharge: HOME | End: 2019-01-09
Payer: MEDICARE

## 2019-01-09 DIAGNOSIS — Z17.0 MALIGNANT NEOPLASM OF UPPER-OUTER QUADRANT OF LEFT BREAST IN FEMALE, ESTROGEN RECEPTOR POSITIVE (HCC): ICD-10-CM

## 2019-01-09 DIAGNOSIS — C50.412 MALIGNANT NEOPLASM OF UPPER-OUTER QUADRANT OF LEFT BREAST IN FEMALE, ESTROGEN RECEPTOR POSITIVE (HCC): ICD-10-CM

## 2019-01-09 LAB
ALBUMIN SERPL-MCNC: 3.7 G/DL (ref 3.2–4.6)
ALBUMIN/GLOB SERPL: 0.9 {RATIO} (ref 1.2–3.5)
ALP SERPL-CCNC: 78 U/L (ref 50–136)
ALT SERPL-CCNC: 19 U/L (ref 12–65)
ANION GAP SERPL CALC-SCNC: 5 MMOL/L (ref 7–16)
AST SERPL-CCNC: 18 U/L (ref 15–37)
BASOPHILS # BLD: 0 K/UL (ref 0–0.2)
BASOPHILS NFR BLD: 1 % (ref 0–2)
BILIRUB SERPL-MCNC: 0.4 MG/DL (ref 0.2–1.1)
BUN SERPL-MCNC: 28 MG/DL (ref 8–23)
CALCIUM SERPL-MCNC: 9.7 MG/DL (ref 8.3–10.4)
CHLORIDE SERPL-SCNC: 101 MMOL/L (ref 98–107)
CO2 SERPL-SCNC: 31 MMOL/L (ref 21–32)
CREAT SERPL-MCNC: 1.2 MG/DL (ref 0.6–1)
DIFFERENTIAL METHOD BLD: NORMAL
EOSINOPHIL # BLD: 0.2 K/UL (ref 0–0.8)
EOSINOPHIL NFR BLD: 3 % (ref 0.5–7.8)
ERYTHROCYTE [DISTWIDTH] IN BLOOD BY AUTOMATED COUNT: 13.5 % (ref 11.9–14.6)
GLOBULIN SER CALC-MCNC: 4 G/DL (ref 2.3–3.5)
GLUCOSE SERPL-MCNC: 97 MG/DL (ref 65–100)
HCT VFR BLD AUTO: 37.9 % (ref 35.8–46.3)
HGB BLD-MCNC: 12.6 G/DL (ref 11.7–15.4)
IMM GRANULOCYTES # BLD AUTO: 0 K/UL (ref 0–0.5)
IMM GRANULOCYTES NFR BLD AUTO: 0 % (ref 0–5)
LYMPHOCYTES # BLD: 1.8 K/UL (ref 0.5–4.6)
LYMPHOCYTES NFR BLD: 29 % (ref 13–44)
MCH RBC QN AUTO: 30.2 PG (ref 26.1–32.9)
MCHC RBC AUTO-ENTMCNC: 33.2 G/DL (ref 31.4–35)
MCV RBC AUTO: 90.9 FL (ref 79.6–97.8)
MONOCYTES # BLD: 0.5 K/UL (ref 0.1–1.3)
MONOCYTES NFR BLD: 8 % (ref 4–12)
NEUTS SEG # BLD: 3.7 K/UL (ref 1.7–8.2)
NEUTS SEG NFR BLD: 59 % (ref 43–78)
NRBC # BLD: 0.01 K/UL (ref 0–0.2)
PLATELET # BLD AUTO: 244 K/UL (ref 150–450)
PMV BLD AUTO: 10.9 FL (ref 9.4–12.3)
POTASSIUM SERPL-SCNC: 4.4 MMOL/L (ref 3.5–5.1)
PROT SERPL-MCNC: 7.7 G/DL (ref 6.3–8.2)
RBC # BLD AUTO: 4.17 M/UL (ref 4.05–5.25)
SODIUM SERPL-SCNC: 137 MMOL/L (ref 136–145)
WBC # BLD AUTO: 6.3 K/UL (ref 4.3–11.1)

## 2019-01-09 PROCEDURE — 85025 COMPLETE CBC W/AUTO DIFF WBC: CPT

## 2019-01-09 PROCEDURE — 36415 COLL VENOUS BLD VENIPUNCTURE: CPT

## 2019-01-09 PROCEDURE — 80053 COMPREHEN METABOLIC PANEL: CPT

## 2019-03-23 ENCOUNTER — HOSPITAL ENCOUNTER (OUTPATIENT)
Dept: MAMMOGRAPHY | Age: 74
Discharge: HOME | End: 2019-03-23
Attending: INTERNAL MEDICINE
Payer: MEDICARE

## 2019-03-23 DIAGNOSIS — Z17.0 MALIGNANT NEOPLASM OF UPPER-OUTER QUADRANT OF LEFT BREAST IN FEMALE, ESTROGEN RECEPTOR POSITIVE (HCC): ICD-10-CM

## 2019-03-23 DIAGNOSIS — Z51.81 ENCOUNTER FOR MONITORING AROMATASE INHIBITOR THERAPY: ICD-10-CM

## 2019-03-23 DIAGNOSIS — Z12.39 BREAST CANCER SCREENING, HIGH RISK PATIENT: ICD-10-CM

## 2019-03-23 DIAGNOSIS — Z79.811 ENCOUNTER FOR MONITORING AROMATASE INHIBITOR THERAPY: ICD-10-CM

## 2019-03-23 DIAGNOSIS — C50.412 MALIGNANT NEOPLASM OF UPPER-OUTER QUADRANT OF LEFT BREAST IN FEMALE, ESTROGEN RECEPTOR POSITIVE (HCC): ICD-10-CM

## 2019-03-23 PROCEDURE — 77067 SCR MAMMO BI INCL CAD: CPT

## 2019-03-27 ENCOUNTER — HOSPITAL ENCOUNTER (OUTPATIENT)
Dept: MAMMOGRAPHY | Age: 74
Discharge: HOME | End: 2019-03-27
Attending: INTERNAL MEDICINE
Payer: MEDICARE

## 2019-03-27 DIAGNOSIS — R92.8 ABNORMAL SCREENING MAMMOGRAM: ICD-10-CM

## 2019-03-27 DIAGNOSIS — Z85.3 HX OF BREAST CANCER: ICD-10-CM

## 2019-03-27 PROCEDURE — 77065 DX MAMMO INCL CAD UNI: CPT

## 2019-03-27 PROCEDURE — 76642 ULTRASOUND BREAST LIMITED: CPT

## 2019-04-03 ENCOUNTER — HOSPITAL ENCOUNTER (OUTPATIENT)
Dept: MAMMOGRAPHY | Age: 74
Discharge: HOME | End: 2019-04-03
Attending: INTERNAL MEDICINE
Payer: MEDICARE

## 2019-04-03 VITALS — DIASTOLIC BLOOD PRESSURE: 52 MMHG | HEART RATE: 61 BPM | SYSTOLIC BLOOD PRESSURE: 102 MMHG

## 2019-04-03 DIAGNOSIS — R92.8 ABNORMAL MAMMOGRAM: ICD-10-CM

## 2019-04-03 DIAGNOSIS — N63.20 BREAST MASS, LEFT: ICD-10-CM

## 2019-04-03 PROCEDURE — 19083 BX BREAST 1ST LESION US IMAG: CPT

## 2019-04-03 PROCEDURE — 77065 DX MAMMO INCL CAD UNI: CPT

## 2019-04-03 PROCEDURE — 74011250636 HC RX REV CODE- 250/636: Performed by: INTERNAL MEDICINE

## 2019-04-03 PROCEDURE — 88305 TISSUE EXAM BY PATHOLOGIST: CPT

## 2019-04-03 RX ORDER — LIDOCAINE HYDROCHLORIDE 10 MG/ML
6 INJECTION INFILTRATION; PERINEURAL
Status: COMPLETED | OUTPATIENT
Start: 2019-04-03 | End: 2019-04-03

## 2019-04-03 RX ADMIN — LIDOCAINE HYDROCHLORIDE 6 ML: 10 INJECTION, SOLUTION INFILTRATION; PERINEURAL at 09:44

## 2019-05-28 PROBLEM — E66.9 OBESITY: Status: ACTIVE | Noted: 2019-05-28

## 2019-07-05 ENCOUNTER — HOSPITAL ENCOUNTER (OUTPATIENT)
Dept: LAB | Age: 74
Discharge: HOME | End: 2019-07-05
Payer: MEDICARE

## 2019-07-05 ENCOUNTER — HOSPITAL ENCOUNTER (EMERGENCY)
Age: 74
Discharge: HOME | End: 2019-07-05
Attending: EMERGENCY MEDICINE | Admitting: EMERGENCY MEDICINE
Payer: MEDICARE

## 2019-07-05 VITALS
SYSTOLIC BLOOD PRESSURE: 158 MMHG | RESPIRATION RATE: 18 BRPM | OXYGEN SATURATION: 100 % | DIASTOLIC BLOOD PRESSURE: 94 MMHG | TEMPERATURE: 97.8 F | HEIGHT: 64 IN | WEIGHT: 185 LBS | BODY MASS INDEX: 31.58 KG/M2 | HEART RATE: 88 BPM

## 2019-07-05 DIAGNOSIS — R11.0 NAUSEA WITHOUT VOMITING: ICD-10-CM

## 2019-07-05 DIAGNOSIS — F41.0 ANXIETY ATTACK: Primary | ICD-10-CM

## 2019-07-05 DIAGNOSIS — Z17.0 MALIGNANT NEOPLASM OF UPPER-OUTER QUADRANT OF LEFT BREAST IN FEMALE, ESTROGEN RECEPTOR POSITIVE (HCC): ICD-10-CM

## 2019-07-05 DIAGNOSIS — C50.412 MALIGNANT NEOPLASM OF UPPER-OUTER QUADRANT OF LEFT BREAST IN FEMALE, ESTROGEN RECEPTOR POSITIVE (HCC): ICD-10-CM

## 2019-07-05 LAB
ALBUMIN SERPL-MCNC: 3.8 G/DL (ref 3.2–4.6)
ALBUMIN/GLOB SERPL: 1 {RATIO} (ref 1.2–3.5)
ALP SERPL-CCNC: 78 U/L (ref 50–136)
ALT SERPL-CCNC: 30 U/L (ref 12–65)
ANION GAP SERPL CALC-SCNC: 11 MMOL/L (ref 7–16)
ANION GAP SERPL CALC-SCNC: 13 MMOL/L (ref 7–16)
AST SERPL-CCNC: 24 U/L (ref 15–37)
BASOPHILS # BLD: 0 K/UL (ref 0–0.2)
BASOPHILS NFR BLD: 0 % (ref 0–2)
BILIRUB SERPL-MCNC: 0.5 MG/DL (ref 0.2–1.1)
BUN SERPL-MCNC: 13 MG/DL (ref 8–23)
BUN SERPL-MCNC: 14 MG/DL (ref 8–23)
CALCIUM SERPL-MCNC: 10.1 MG/DL (ref 8.3–10.4)
CALCIUM SERPL-MCNC: 10.2 MG/DL (ref 8.3–10.4)
CHLORIDE SERPL-SCNC: 97 MMOL/L (ref 98–107)
CHLORIDE SERPL-SCNC: 98 MMOL/L (ref 98–107)
CO2 SERPL-SCNC: 23 MMOL/L (ref 21–32)
CO2 SERPL-SCNC: 24 MMOL/L (ref 21–32)
CREAT SERPL-MCNC: 0.97 MG/DL (ref 0.6–1)
CREAT SERPL-MCNC: 1.06 MG/DL (ref 0.6–1)
DIFFERENTIAL METHOD BLD: NORMAL
EOSINOPHIL # BLD: 0.1 K/UL (ref 0–0.8)
EOSINOPHIL NFR BLD: 2 % (ref 0.5–7.8)
ERYTHROCYTE [DISTWIDTH] IN BLOOD BY AUTOMATED COUNT: 13.8 % (ref 11.9–14.6)
GLOBULIN SER CALC-MCNC: 3.8 G/DL (ref 2.3–3.5)
GLUCOSE SERPL-MCNC: 105 MG/DL (ref 65–100)
GLUCOSE SERPL-MCNC: 115 MG/DL (ref 65–100)
HCT VFR BLD AUTO: 36.7 % (ref 35.8–46.3)
HGB BLD-MCNC: 12.8 G/DL (ref 11.7–15.4)
IMM GRANULOCYTES # BLD AUTO: 0 K/UL (ref 0–0.5)
IMM GRANULOCYTES NFR BLD AUTO: 0 % (ref 0–5)
LYMPHOCYTES # BLD: 2.5 K/UL (ref 0.5–4.6)
LYMPHOCYTES NFR BLD: 28 % (ref 13–44)
MCH RBC QN AUTO: 30.9 PG (ref 26.1–32.9)
MCHC RBC AUTO-ENTMCNC: 34.9 G/DL (ref 31.4–35)
MCV RBC AUTO: 88.6 FL (ref 79.6–97.8)
MONOCYTES # BLD: 0.6 K/UL (ref 0.1–1.3)
MONOCYTES NFR BLD: 6 % (ref 4–12)
NEUTS SEG # BLD: 5.7 K/UL (ref 1.7–8.2)
NEUTS SEG NFR BLD: 64 % (ref 43–78)
NRBC # BLD: 0 K/UL (ref 0–0.2)
PLATELET # BLD AUTO: 354 K/UL (ref 150–450)
PMV BLD AUTO: 9.8 FL (ref 9.4–12.3)
POTASSIUM SERPL-SCNC: 4.1 MMOL/L (ref 3.5–5.1)
POTASSIUM SERPL-SCNC: 4.2 MMOL/L (ref 3.5–5.1)
PROT SERPL-MCNC: 7.6 G/DL (ref 6.3–8.2)
RBC # BLD AUTO: 4.14 M/UL (ref 4.05–5.25)
SODIUM SERPL-SCNC: 132 MMOL/L (ref 136–145)
SODIUM SERPL-SCNC: 134 MMOL/L (ref 136–145)
WBC # BLD AUTO: 9.1 K/UL (ref 4.3–11.1)

## 2019-07-05 PROCEDURE — 36415 COLL VENOUS BLD VENIPUNCTURE: CPT

## 2019-07-05 PROCEDURE — 80048 BASIC METABOLIC PNL TOTAL CA: CPT

## 2019-07-05 PROCEDURE — 96374 THER/PROPH/DIAG INJ IV PUSH: CPT | Performed by: EMERGENCY MEDICINE

## 2019-07-05 PROCEDURE — 85025 COMPLETE CBC W/AUTO DIFF WBC: CPT

## 2019-07-05 PROCEDURE — 99284 EMERGENCY DEPT VISIT MOD MDM: CPT | Performed by: EMERGENCY MEDICINE

## 2019-07-05 PROCEDURE — 74011250636 HC RX REV CODE- 250/636: Performed by: EMERGENCY MEDICINE

## 2019-07-05 PROCEDURE — 96375 TX/PRO/DX INJ NEW DRUG ADDON: CPT | Performed by: EMERGENCY MEDICINE

## 2019-07-05 PROCEDURE — 80053 COMPREHEN METABOLIC PANEL: CPT

## 2019-07-05 RX ORDER — ONDANSETRON 2 MG/ML
4 INJECTION INTRAMUSCULAR; INTRAVENOUS
Status: COMPLETED | OUTPATIENT
Start: 2019-07-05 | End: 2019-07-05

## 2019-07-05 RX ORDER — LORAZEPAM 2 MG/ML
1 INJECTION INTRAMUSCULAR
Status: COMPLETED | OUTPATIENT
Start: 2019-07-05 | End: 2019-07-05

## 2019-07-05 RX ORDER — ONDANSETRON 4 MG/1
4 TABLET, ORALLY DISINTEGRATING ORAL
Qty: 12 TAB | Refills: 0 | Status: SHIPPED | OUTPATIENT
Start: 2019-07-05 | End: 2019-07-30

## 2019-07-05 RX ORDER — LORAZEPAM 2 MG/ML
1 INJECTION INTRAMUSCULAR
Status: DISCONTINUED | OUTPATIENT
Start: 2019-07-05 | End: 2019-07-05

## 2019-07-05 RX ADMIN — SODIUM CHLORIDE 500 ML: 900 INJECTION, SOLUTION INTRAVENOUS at 18:31

## 2019-07-05 RX ADMIN — ONDANSETRON 4 MG: 2 INJECTION INTRAMUSCULAR; INTRAVENOUS at 18:30

## 2019-07-05 RX ADMIN — LORAZEPAM 1 MG: 2 INJECTION INTRAMUSCULAR; INTRAVENOUS at 17:40

## 2019-07-05 NOTE — ED TRIAGE NOTES
Patient arrived via EMS from cancer center. States stopped cymbalta and klonopin. Started on ativan.  withheld ativan prior to MD visit this morning and now has tingling in hands and lips and extremely anxious. Sent here d/t possible anxiety attack.

## 2019-07-05 NOTE — ED PROVIDER NOTES
The patient is a 78-year-old female who presented to the emergency department today secondary to a suspected panic attack. The patient has a history of anxiety and was going to her oncologist today for a follow-up. She has a history of breast cancer. Her  said that he did not give her the Ativan that she is supposed take twice a day this morning he was afraid it would make her fall asleep at the doctor's appointment. She was recently changed from Klonopin and Celexa to Ativan twice daily. The patient does have a history of anxiety and panic disorder. She said that she feels tingling inside and is shaking her hands and she does not do this it makes the tingling inside worse. The patient says that she's had this happen to her before but never quite this intense. The patient's  did ask if I would be able to admit her for a couple of days because of the anxiety. She does not have any suicidal or homicidal ideations.            Past Medical History:   Diagnosis Date    Abdominal pain, right upper quadrant     Abnormal weight gain     Acute sinusitis, unspecified     Benign neoplasm of colon 2/25/2015    Benign paroxysmal positional vertigo 2/25/2015    Breast cancer ([facility] [address] 05/2017    left    Calculus of gallbladder without mention of cholecystitis or obstruction 2/25/2015    Depressive disorder, not elsewhere classified 2/25/2015    Dysuria     Essential hypertension, benign 2/25/2015    Insomnia, unspecified 2/25/2015    Nausea alone     Neoplasm of uncertain behavior of skin     Other malaise and fatigue     Primary localized osteoarthrosis, unspecified site 2/25/2015    Radiation therapy complication     Urinary tract infection, site not specified     Vaginitis and vulvovaginitis, unspecified 2/25/2015       Past Surgical History:   Procedure Laterality Date    HX BREAST BIOPSY      HX BREAST BIOPSY Left 04/03/2019    HX BREAST LUMPECTOMY Right     benign per pt    HX BREAST LUMPECTOMY Left 5/5/2017    LEFT BREAST NEEDLE LOCALIZED LUMPECTOMY performed by Phillip Mckeon MD [facility]         Family History:   Problem Relation Age of Onset    Cancer Maternal Aunt         breast, great aunt    Cancer Maternal Grandmother         great grandmother, breast    Breast Cancer Maternal Grandmother        Social History     Socioeconomic History    Marital status:      Spouse name: Not on file    Number of children: Not on file    Years of education: Not on file    Highest education level: Not on file   Occupational History    Not on file   Social Needs    Financial resource strain: Not on file    Food insecurity:     Worry: Not on file     Inability: Not on file    Transportation needs:     Medical: Not on file     Non-medical: Not on file   Tobacco Use    Smoking status: Never Smoker    Smokeless tobacco: Never Used   Substance and Sexual Activity    Alcohol use: No    Drug use: No    Sexual activity: Not on file   Lifestyle    Physical activity:     Days per week: Not on file     Minutes per session: Not on file    Stress: Not on file   Relationships    Social connections:     Talks on phone: Not on file     Gets together: Not on file     Attends Jewish service: Not on file     Active member of club or organization: Not on file     Attends meetings of clubs or organizations: Not on file     Relationship status: Not on file    Intimate partner violence:     Fear of current or ex partner: Not on file     Emotionally abused: Not on file     Physically abused: Not on file     Forced sexual activity: Not on file   Other Topics Concern    Not on file   Social History Narrative    Not on file         ALLERGIES: Penicillins    Review of Systems   Constitutional: Negative. HENT: Negative. Eyes: Negative. Respiratory: Negative. Musculoskeletal: Negative. Neurological: Negative. Psychiatric/Behavioral: The patient is nervous/anxious. Vitals:    07/05/19 1536   BP: (!) 158/94   Pulse: 88   Resp: 18   Temp: 97.8 °F (36.6 °C)   SpO2: 100%   Weight: 83.9 kg (185 lb)   Height: 5' 4\" (1.626 m)            Physical Exam     GENERAL:The patient is overweight, and well-hydrated. VITAL SIGNS: Heart rate, blood pressure, respiratory rate reviewed as recorded in  nurse's notes  EYES: Pupils reactive. Extraocular motion intact. No conjunctival redness or drainage. MOUTH/THROAT: Pharynx clear; airway patent. NECK: Supple, no meningeal signs. Trachea midline. No masses or thyromegaly. LUNGS: Breath sounds clear and equal bilaterally no accessory muscle use  CHEST: No deformity  CARDIOVASCULAR: Regular rate and rhythm  ABDOMEN: Soft without tenderness. No palpable masses or organomegaly. No  peritoneal signs. No rigidity. EXTREMITIES: No clubbing or cyanosis. No joint swelling. Normal muscle tone. No  restricted range of motion appreciated. NEUROLOGIC: Sensation is grossly intact. Cranial nerve exam reveals face is  symmetrical, tongue is midline speech is clear. SKIN: No rash or petechiae. Good skin turgor palpated. PSYCHIATRIC: Alert and oriented. Anxious with pressured speech.       MDM  Number of Diagnoses or Management Options  Diagnosis management comments: Suicidal ideations, homicidal ideations, self-inflicted injury,    Schizophrenia, schizoaffective disorder, personality disorder, major depression,   depression with anxiety, depression reactive to situation, depression, anxiety, panic  attack, hyperventilation syndrome, bipolar disorder,    Substance abuse, medication noncompliance, drug abuse, alcohol abuse, alcohol  intoxication,    Eating disorder, bulimia nervosa, anorexia nervosa, adjustment reaction         Amount and/or Complexity of Data Reviewed  Tests in the medicine section of CPT®: ordered and reviewed  Review and summarize past medical records: yes      ED Course as of Jul 05 1824 Fri Jul 05, 2019   1817 Follow-up the patient she is no longer trembling but when I ask her how she is doing she says awful. The patient says that she feels nauseous but has not had any episodes of vomiting. She will be given some Zofran and 500 cc of IV fluids. The patient will be discharged to home after that and instructed to take her anxiety medications.     [KH]      ED Course User Index  [KH] Arsen Petty, DO       Procedures

## 2019-07-05 NOTE — DISCHARGE INSTRUCTIONS
Take your Ativan twice a day as prescribed. Take Zofran as needed for nausea. If she begins developing any new or concerning symptoms return the emergency Department at that time.

## 2019-07-06 NOTE — ED NOTES
I have reviewed discharge instructions with the patient. The patient verbalized understanding. Patient left ED via Discharge Method: ambulatory to Home with spouse    Opportunity for questions and clarification provided. Patient given 1 scripts. To continue your aftercare when you leave the hospital, you may receive an automated call from our care team to check in on how you are doing. This is a free service and part of our promise to provide the best care and service to meet your aftercare needs.  If you have questions, or wish to unsubscribe from this service please call [phone]. Thank you for Choosing our [facility] Emergency Department.

## 2019-07-30 PROBLEM — F41.0 PANIC DISORDER: Status: ACTIVE | Noted: 2019-07-30

## 2019-10-08 ENCOUNTER — HOSPITAL ENCOUNTER (OUTPATIENT)
Dept: MAMMOGRAPHY | Age: 74
Discharge: HOME | End: 2019-10-08
Attending: INTERNAL MEDICINE
Payer: MEDICARE

## 2019-10-08 DIAGNOSIS — C50.412 MALIGNANT NEOPLASM OF UPPER-OUTER QUADRANT OF LEFT BREAST IN FEMALE, ESTROGEN RECEPTOR POSITIVE (HCC): ICD-10-CM

## 2019-10-08 DIAGNOSIS — Z17.0 MALIGNANT NEOPLASM OF UPPER-OUTER QUADRANT OF LEFT BREAST IN FEMALE, ESTROGEN RECEPTOR POSITIVE (HCC): ICD-10-CM

## 2019-10-08 PROCEDURE — 77065 DX MAMMO INCL CAD UNI: CPT

## 2019-11-21 ENCOUNTER — HOSPITAL ENCOUNTER (OUTPATIENT)
Dept: RADIATION ONCOLOGY | Age: 74
Discharge: HOME | End: 2019-11-21
Payer: MEDICARE

## 2019-11-21 VITALS
BODY MASS INDEX: 29.13 KG/M2 | WEIGHT: 169.7 LBS | TEMPERATURE: 98 F | SYSTOLIC BLOOD PRESSURE: 131 MMHG | DIASTOLIC BLOOD PRESSURE: 84 MMHG | OXYGEN SATURATION: 96 % | HEART RATE: 72 BPM

## 2019-11-21 PROCEDURE — 99211 OFF/OP EST MAY X REQ PHY/QHP: CPT

## 2019-11-21 NOTE — NURSE NAVIGATOR
F/u left breast cancer. S/p lumpectomy 5-5-17. Declined chemotherapy. RT end 8-16-17. F/u Dr. Zuleyka Mckeon 1-20-20. Taking Arimidex daily  Bilateral Mammogram 3-23-19, left diagnostic mammogram and ultrasound 3-27-19. Biopsy left breast 4-3-19. Left breast mammogram 10-8-19. Pt suffering from bout of depression. Saw psychiatrist yesterday. Antidepressant increased. She denied suicidal thoughts. States she has been to therapy but did not do what they suggested. Paula Ramirez RN    .

## 2020-01-20 ENCOUNTER — HOSPITAL ENCOUNTER (OUTPATIENT)
Dept: LAB | Age: 75
Discharge: HOME | End: 2020-01-20
Payer: MEDICARE

## 2020-01-20 DIAGNOSIS — C50.412 MALIGNANT NEOPLASM OF UPPER-OUTER QUADRANT OF LEFT BREAST IN FEMALE, ESTROGEN RECEPTOR POSITIVE (HCC): ICD-10-CM

## 2020-01-20 DIAGNOSIS — Z17.0 MALIGNANT NEOPLASM OF UPPER-OUTER QUADRANT OF LEFT BREAST IN FEMALE, ESTROGEN RECEPTOR POSITIVE (HCC): ICD-10-CM

## 2020-01-20 LAB
ALBUMIN SERPL-MCNC: 3.8 G/DL (ref 3.2–4.6)
ALBUMIN/GLOB SERPL: 1.1 {RATIO} (ref 1.2–3.5)
ALP SERPL-CCNC: 92 U/L (ref 50–136)
ALT SERPL-CCNC: 22 U/L (ref 12–65)
ANION GAP SERPL CALC-SCNC: 6 MMOL/L (ref 7–16)
AST SERPL-CCNC: 18 U/L (ref 15–37)
BASOPHILS # BLD: 0 K/UL (ref 0–0.2)
BASOPHILS NFR BLD: 0 % (ref 0–2)
BILIRUB SERPL-MCNC: 0.3 MG/DL (ref 0.2–1.1)
BUN SERPL-MCNC: 17 MG/DL (ref 8–23)
CALCIUM SERPL-MCNC: 9.7 MG/DL (ref 8.3–10.4)
CHLORIDE SERPL-SCNC: 102 MMOL/L (ref 98–107)
CO2 SERPL-SCNC: 28 MMOL/L (ref 21–32)
CREAT SERPL-MCNC: 1.03 MG/DL (ref 0.6–1)
DIFFERENTIAL METHOD BLD: NORMAL
EOSINOPHIL # BLD: 0.1 K/UL (ref 0–0.8)
EOSINOPHIL NFR BLD: 1 % (ref 0.5–7.8)
ERYTHROCYTE [DISTWIDTH] IN BLOOD BY AUTOMATED COUNT: 13.6 % (ref 11.9–14.6)
GLOBULIN SER CALC-MCNC: 3.6 G/DL (ref 2.3–3.5)
GLUCOSE SERPL-MCNC: 102 MG/DL (ref 65–100)
HCT VFR BLD AUTO: 40.4 % (ref 35.8–46.3)
HGB BLD-MCNC: 13.5 G/DL (ref 11.7–15.4)
IMM GRANULOCYTES # BLD AUTO: 0 K/UL (ref 0–0.5)
IMM GRANULOCYTES NFR BLD AUTO: 0 % (ref 0–5)
LYMPHOCYTES # BLD: 2.2 K/UL (ref 0.5–4.6)
LYMPHOCYTES NFR BLD: 23 % (ref 13–44)
MCH RBC QN AUTO: 30.7 PG (ref 26.1–32.9)
MCHC RBC AUTO-ENTMCNC: 33.4 G/DL (ref 31.4–35)
MCV RBC AUTO: 91.8 FL (ref 79.6–97.8)
MONOCYTES # BLD: 0.5 K/UL (ref 0.1–1.3)
MONOCYTES NFR BLD: 5 % (ref 4–12)
NEUTS SEG # BLD: 6.8 K/UL (ref 1.7–8.2)
NEUTS SEG NFR BLD: 71 % (ref 43–78)
NRBC # BLD: 0 K/UL (ref 0–0.2)
PLATELET # BLD AUTO: 266 K/UL (ref 150–450)
PMV BLD AUTO: 11.3 FL (ref 9.4–12.3)
POTASSIUM SERPL-SCNC: 4.3 MMOL/L (ref 3.5–5.1)
PROT SERPL-MCNC: 7.4 G/DL (ref 6.3–8.2)
RBC # BLD AUTO: 4.4 M/UL (ref 4.05–5.25)
SODIUM SERPL-SCNC: 136 MMOL/L (ref 136–145)
WBC # BLD AUTO: 9.5 K/UL (ref 4.3–11.1)

## 2020-01-20 PROCEDURE — 85025 COMPLETE CBC W/AUTO DIFF WBC: CPT

## 2020-01-20 PROCEDURE — 36415 COLL VENOUS BLD VENIPUNCTURE: CPT

## 2020-01-20 PROCEDURE — 80053 COMPREHEN METABOLIC PANEL: CPT

## 2020-05-19 PROBLEM — Z79.899 ENCOUNTER FOR LONG-TERM CURRENT USE OF MEDICATION: Status: ACTIVE | Noted: 2020-05-19

## 2020-05-19 PROBLEM — E87.1 HYPONATREMIA: Status: ACTIVE | Noted: 2019-07-19

## 2020-05-19 PROBLEM — Z87.898 HISTORY OF SEIZURE: Status: ACTIVE | Noted: 2020-05-19

## 2020-05-19 PROBLEM — R41.3 MEMORY PROBLEM: Status: ACTIVE | Noted: 2020-05-19

## 2020-07-27 ENCOUNTER — HOSPITAL ENCOUNTER (OUTPATIENT)
Dept: LAB | Age: 75
Discharge: HOME | End: 2020-07-27
Payer: MEDICARE

## 2020-07-27 DIAGNOSIS — Z85.3 HX OF BREAST CANCER: ICD-10-CM

## 2020-07-27 LAB
ALBUMIN SERPL-MCNC: 3.9 G/DL (ref 3.2–4.6)
ALBUMIN/GLOB SERPL: 1.1 {RATIO} (ref 1.2–3.5)
ALP SERPL-CCNC: 83 U/L (ref 50–136)
ALT SERPL-CCNC: 28 U/L (ref 12–65)
ANION GAP SERPL CALC-SCNC: 4 MMOL/L (ref 7–16)
AST SERPL-CCNC: 23 U/L (ref 15–37)
BASOPHILS # BLD: 0 K/UL (ref 0–0.2)
BASOPHILS NFR BLD: 0 % (ref 0–2)
BILIRUB SERPL-MCNC: 0.4 MG/DL (ref 0.2–1.1)
BUN SERPL-MCNC: 14 MG/DL (ref 8–23)
CALCIUM SERPL-MCNC: 9.5 MG/DL (ref 8.3–10.4)
CHLORIDE SERPL-SCNC: 106 MMOL/L (ref 98–107)
CO2 SERPL-SCNC: 28 MMOL/L (ref 21–32)
CREAT SERPL-MCNC: 1.2 MG/DL (ref 0.6–1)
DIFFERENTIAL METHOD BLD: NORMAL
EOSINOPHIL # BLD: 0 K/UL (ref 0–0.8)
EOSINOPHIL NFR BLD: 1 % (ref 0.5–7.8)
ERYTHROCYTE [DISTWIDTH] IN BLOOD BY AUTOMATED COUNT: 14 % (ref 11.9–14.6)
GLOBULIN SER CALC-MCNC: 3.5 G/DL (ref 2.3–3.5)
GLUCOSE SERPL-MCNC: 97 MG/DL (ref 65–100)
HCT VFR BLD AUTO: 40 % (ref 35.8–46.3)
HGB BLD-MCNC: 13.4 G/DL (ref 11.7–15.4)
IMM GRANULOCYTES # BLD AUTO: 0 K/UL (ref 0–0.5)
IMM GRANULOCYTES NFR BLD AUTO: 0 % (ref 0–5)
LYMPHOCYTES # BLD: 2 K/UL (ref 0.5–4.6)
LYMPHOCYTES NFR BLD: 26 % (ref 13–44)
MCH RBC QN AUTO: 31 PG (ref 26.1–32.9)
MCHC RBC AUTO-ENTMCNC: 33.5 G/DL (ref 31.4–35)
MCV RBC AUTO: 92.6 FL (ref 79.6–97.8)
MONOCYTES # BLD: 0.6 K/UL (ref 0.1–1.3)
MONOCYTES NFR BLD: 7 % (ref 4–12)
NEUTS SEG # BLD: 5.1 K/UL (ref 1.7–8.2)
NEUTS SEG NFR BLD: 66 % (ref 43–78)
NRBC # BLD: 0 K/UL (ref 0–0.2)
PLATELET # BLD AUTO: 270 K/UL (ref 150–450)
PMV BLD AUTO: 10.6 FL (ref 9.4–12.3)
POTASSIUM SERPL-SCNC: 4.6 MMOL/L (ref 3.5–5.1)
PROT SERPL-MCNC: 7.4 G/DL (ref 6.3–8.2)
RBC # BLD AUTO: 4.32 M/UL (ref 4.05–5.25)
SODIUM SERPL-SCNC: 138 MMOL/L (ref 136–145)
WBC # BLD AUTO: 7.8 K/UL (ref 4.3–11.1)

## 2020-07-27 PROCEDURE — 80053 COMPREHEN METABOLIC PANEL: CPT

## 2020-07-27 PROCEDURE — 85025 COMPLETE CBC W/AUTO DIFF WBC: CPT

## 2020-07-27 PROCEDURE — 36415 COLL VENOUS BLD VENIPUNCTURE: CPT

## 2020-08-17 PROBLEM — F41.0 PANIC DISORDER: Chronic | Status: ACTIVE | Noted: 2019-07-30

## 2021-01-27 ENCOUNTER — HOSPITAL ENCOUNTER (OUTPATIENT)
Dept: LAB | Age: 76
Discharge: HOME | End: 2021-01-27
Payer: MEDICARE

## 2021-01-27 DIAGNOSIS — C50.412 MALIGNANT NEOPLASM OF UPPER-OUTER QUADRANT OF LEFT BREAST IN FEMALE, ESTROGEN RECEPTOR POSITIVE (HCC): ICD-10-CM

## 2021-01-27 DIAGNOSIS — Z17.0 MALIGNANT NEOPLASM OF UPPER-OUTER QUADRANT OF LEFT BREAST IN FEMALE, ESTROGEN RECEPTOR POSITIVE (HCC): ICD-10-CM

## 2021-01-27 LAB
ALBUMIN SERPL-MCNC: 3.6 G/DL (ref 3.2–4.6)
ALBUMIN/GLOB SERPL: 1.1 {RATIO} (ref 1.2–3.5)
ALP SERPL-CCNC: 70 U/L (ref 50–136)
ALT SERPL-CCNC: 22 U/L (ref 12–65)
ANION GAP SERPL CALC-SCNC: 3 MMOL/L (ref 7–16)
AST SERPL-CCNC: 17 U/L (ref 15–37)
BASOPHILS # BLD: 0.1 K/UL (ref 0–0.2)
BASOPHILS NFR BLD: 1 % (ref 0–2)
BILIRUB SERPL-MCNC: 0.3 MG/DL (ref 0.2–1.1)
BUN SERPL-MCNC: 18 MG/DL (ref 8–23)
CALCIUM SERPL-MCNC: 8.9 MG/DL (ref 8.3–10.4)
CHLORIDE SERPL-SCNC: 104 MMOL/L (ref 98–107)
CO2 SERPL-SCNC: 32 MMOL/L (ref 21–32)
CREAT SERPL-MCNC: 0.8 MG/DL (ref 0.6–1)
DIFFERENTIAL METHOD BLD: ABNORMAL
EOSINOPHIL # BLD: 0.1 K/UL (ref 0–0.8)
EOSINOPHIL NFR BLD: 2 % (ref 0.5–7.8)
ERYTHROCYTE [DISTWIDTH] IN BLOOD BY AUTOMATED COUNT: 13.2 % (ref 11.9–14.6)
GLOBULIN SER CALC-MCNC: 3.3 G/DL (ref 2.3–3.5)
GLUCOSE SERPL-MCNC: 91 MG/DL (ref 65–100)
HCT VFR BLD AUTO: 38 % (ref 35.8–46.3)
HGB BLD-MCNC: 12.4 G/DL (ref 11.7–15.4)
IMM GRANULOCYTES # BLD AUTO: 0 K/UL (ref 0–0.5)
IMM GRANULOCYTES NFR BLD AUTO: 0 % (ref 0–5)
LYMPHOCYTES # BLD: 2.2 K/UL (ref 0.5–4.6)
LYMPHOCYTES NFR BLD: 41 % (ref 13–44)
MCH RBC QN AUTO: 30.7 PG (ref 26.1–32.9)
MCHC RBC AUTO-ENTMCNC: 32.6 G/DL (ref 31.4–35)
MCV RBC AUTO: 94.1 FL (ref 79.6–97.8)
MONOCYTES # BLD: 0.5 K/UL (ref 0.1–1.3)
MONOCYTES NFR BLD: 8 % (ref 4–12)
NEUTS SEG # BLD: 2.6 K/UL (ref 1.7–8.2)
NEUTS SEG NFR BLD: 48 % (ref 43–78)
NRBC # BLD: 0 K/UL (ref 0–0.2)
PLATELET # BLD AUTO: 208 K/UL (ref 150–450)
PMV BLD AUTO: 10.8 FL (ref 9.4–12.3)
POTASSIUM SERPL-SCNC: 3.8 MMOL/L (ref 3.5–5.1)
PROT SERPL-MCNC: 6.9 G/DL (ref 6.3–8.2)
RBC # BLD AUTO: 4.04 M/UL (ref 4.05–5.25)
SODIUM SERPL-SCNC: 139 MMOL/L (ref 136–145)
WBC # BLD AUTO: 5.4 K/UL (ref 4.3–11.1)

## 2021-01-27 PROCEDURE — 36415 COLL VENOUS BLD VENIPUNCTURE: CPT

## 2021-01-27 PROCEDURE — 85025 COMPLETE CBC W/AUTO DIFF WBC: CPT

## 2021-01-27 PROCEDURE — 80053 COMPREHEN METABOLIC PANEL: CPT

## 2021-07-09 ENCOUNTER — HOSPITAL ENCOUNTER (OUTPATIENT)
Dept: MAMMOGRAPHY | Age: 76
Discharge: HOME | End: 2021-07-09
Attending: INTERNAL MEDICINE
Payer: MEDICARE

## 2021-07-09 DIAGNOSIS — Z79.811 LONG TERM (CURRENT) USE OF AROMATASE INHIBITORS: ICD-10-CM

## 2021-07-09 DIAGNOSIS — Z17.0 MALIGNANT NEOPLASM OF UPPER-OUTER QUADRANT OF LEFT BREAST IN FEMALE, ESTROGEN RECEPTOR POSITIVE (HCC): ICD-10-CM

## 2021-07-09 DIAGNOSIS — Z12.31 ENCOUNTER FOR SCREENING MAMMOGRAM FOR MALIGNANT NEOPLASM OF BREAST: ICD-10-CM

## 2021-07-09 DIAGNOSIS — C50.412 MALIGNANT NEOPLASM OF UPPER-OUTER QUADRANT OF LEFT BREAST IN FEMALE, ESTROGEN RECEPTOR POSITIVE (HCC): ICD-10-CM

## 2021-07-09 PROCEDURE — 77067 SCR MAMMO BI INCL CAD: CPT

## 2021-07-09 PROCEDURE — 77080 DXA BONE DENSITY AXIAL: CPT

## 2021-08-04 ENCOUNTER — HOSPITAL ENCOUNTER (OUTPATIENT)
Dept: LAB | Age: 76
Discharge: HOME | End: 2021-08-04
Payer: MEDICARE

## 2021-08-04 DIAGNOSIS — C50.412 MALIGNANT NEOPLASM OF UPPER-OUTER QUADRANT OF LEFT BREAST IN FEMALE, ESTROGEN RECEPTOR POSITIVE (HCC): ICD-10-CM

## 2021-08-04 DIAGNOSIS — Z17.0 MALIGNANT NEOPLASM OF UPPER-OUTER QUADRANT OF LEFT BREAST IN FEMALE, ESTROGEN RECEPTOR POSITIVE (HCC): ICD-10-CM

## 2021-08-04 LAB
ALBUMIN SERPL-MCNC: 3.7 G/DL (ref 3.2–4.6)
ALBUMIN/GLOB SERPL: 1 {RATIO} (ref 1.2–3.5)
ALP SERPL-CCNC: 72 U/L (ref 50–136)
ALT SERPL-CCNC: 19 U/L (ref 12–65)
ANION GAP SERPL CALC-SCNC: 3 MMOL/L (ref 7–16)
AST SERPL-CCNC: 14 U/L (ref 15–37)
BASOPHILS # BLD: 0 K/UL (ref 0–0.2)
BASOPHILS NFR BLD: 1 % (ref 0–2)
BILIRUB SERPL-MCNC: 0.3 MG/DL (ref 0.2–1.1)
BUN SERPL-MCNC: 17 MG/DL (ref 8–23)
CALCIUM SERPL-MCNC: 9.4 MG/DL (ref 8.3–10.4)
CHLORIDE SERPL-SCNC: 106 MMOL/L (ref 98–107)
CO2 SERPL-SCNC: 31 MMOL/L (ref 21–32)
CREAT SERPL-MCNC: 1 MG/DL (ref 0.6–1)
DIFFERENTIAL METHOD BLD: NORMAL
EOSINOPHIL # BLD: 0.1 K/UL (ref 0–0.8)
EOSINOPHIL NFR BLD: 1 % (ref 0.5–7.8)
ERYTHROCYTE [DISTWIDTH] IN BLOOD BY AUTOMATED COUNT: 13.4 % (ref 11.9–14.6)
GLOBULIN SER CALC-MCNC: 3.6 G/DL (ref 2.3–3.5)
GLUCOSE SERPL-MCNC: 105 MG/DL (ref 65–100)
HCT VFR BLD AUTO: 41.7 % (ref 35.8–46.3)
HGB BLD-MCNC: 13.6 G/DL (ref 11.7–15.4)
IMM GRANULOCYTES # BLD AUTO: 0 K/UL (ref 0–0.5)
IMM GRANULOCYTES NFR BLD AUTO: 0 % (ref 0–5)
LYMPHOCYTES # BLD: 2.3 K/UL (ref 0.5–4.6)
LYMPHOCYTES NFR BLD: 36 % (ref 13–44)
MCH RBC QN AUTO: 30.8 PG (ref 26.1–32.9)
MCHC RBC AUTO-ENTMCNC: 32.6 G/DL (ref 31.4–35)
MCV RBC AUTO: 94.3 FL (ref 79.6–97.8)
MONOCYTES # BLD: 0.4 K/UL (ref 0.1–1.3)
MONOCYTES NFR BLD: 7 % (ref 4–12)
NEUTS SEG # BLD: 3.4 K/UL (ref 1.7–8.2)
NEUTS SEG NFR BLD: 55 % (ref 43–78)
NRBC # BLD: 0 K/UL (ref 0–0.2)
PLATELET # BLD AUTO: 216 K/UL (ref 150–450)
PMV BLD AUTO: 10.9 FL (ref 9.4–12.3)
POTASSIUM SERPL-SCNC: 4 MMOL/L (ref 3.5–5.1)
PROT SERPL-MCNC: 7.3 G/DL (ref 6.3–8.2)
RBC # BLD AUTO: 4.42 M/UL (ref 4.05–5.2)
SODIUM SERPL-SCNC: 140 MMOL/L (ref 136–145)
WBC # BLD AUTO: 6.3 K/UL (ref 4.3–11.1)

## 2021-08-04 PROCEDURE — 80053 COMPREHEN METABOLIC PANEL: CPT

## 2021-08-04 PROCEDURE — 36415 COLL VENOUS BLD VENIPUNCTURE: CPT

## 2021-08-04 PROCEDURE — 85025 COMPLETE CBC W/AUTO DIFF WBC: CPT

## 2022-02-02 ENCOUNTER — HOSPITAL ENCOUNTER (OUTPATIENT)
Dept: LAB | Age: 77
Discharge: HOME | End: 2022-02-02
Payer: MEDICARE

## 2022-02-02 DIAGNOSIS — C50.412 MALIGNANT NEOPLASM OF UPPER-OUTER QUADRANT OF LEFT BREAST IN FEMALE, ESTROGEN RECEPTOR POSITIVE (HCC): ICD-10-CM

## 2022-02-02 DIAGNOSIS — Z17.0 MALIGNANT NEOPLASM OF UPPER-OUTER QUADRANT OF LEFT BREAST IN FEMALE, ESTROGEN RECEPTOR POSITIVE (HCC): ICD-10-CM

## 2022-02-02 LAB
ALBUMIN SERPL-MCNC: 3.4 G/DL (ref 3.2–4.6)
ALBUMIN/GLOB SERPL: 0.9 {RATIO} (ref 1.2–3.5)
ALP SERPL-CCNC: 79 U/L (ref 50–136)
ALT SERPL-CCNC: 24 U/L (ref 12–65)
ANION GAP SERPL CALC-SCNC: 3 MMOL/L (ref 7–16)
AST SERPL-CCNC: 24 U/L (ref 15–37)
BASOPHILS # BLD: 0 K/UL (ref 0–0.2)
BASOPHILS NFR BLD: 1 % (ref 0–2)
BILIRUB SERPL-MCNC: 0.3 MG/DL (ref 0.2–1.1)
BUN SERPL-MCNC: 17 MG/DL (ref 8–23)
CALCIUM SERPL-MCNC: 8.9 MG/DL (ref 8.3–10.4)
CHLORIDE SERPL-SCNC: 106 MMOL/L (ref 98–107)
CO2 SERPL-SCNC: 30 MMOL/L (ref 21–32)
CREAT SERPL-MCNC: 1.1 MG/DL (ref 0.6–1)
DIFFERENTIAL METHOD BLD: NORMAL
EOSINOPHIL # BLD: 0.2 K/UL (ref 0–0.8)
EOSINOPHIL NFR BLD: 3 % (ref 0.5–7.8)
ERYTHROCYTE [DISTWIDTH] IN BLOOD BY AUTOMATED COUNT: 13.5 % (ref 11.9–14.6)
GLOBULIN SER CALC-MCNC: 3.7 G/DL (ref 2.3–3.5)
GLUCOSE SERPL-MCNC: 92 MG/DL (ref 65–100)
HCT VFR BLD AUTO: 39.9 % (ref 35.8–46.3)
HGB BLD-MCNC: 13.1 G/DL (ref 11.7–15.4)
IMM GRANULOCYTES # BLD AUTO: 0 K/UL (ref 0–0.5)
IMM GRANULOCYTES NFR BLD AUTO: 0 % (ref 0–5)
LYMPHOCYTES # BLD: 2.2 K/UL (ref 0.5–4.6)
LYMPHOCYTES NFR BLD: 35 % (ref 13–44)
MCH RBC QN AUTO: 30.3 PG (ref 26.1–32.9)
MCHC RBC AUTO-ENTMCNC: 32.8 G/DL (ref 31.4–35)
MCV RBC AUTO: 92.4 FL (ref 79.6–97.8)
MONOCYTES # BLD: 0.5 K/UL (ref 0.1–1.3)
MONOCYTES NFR BLD: 8 % (ref 4–12)
NEUTS SEG # BLD: 3.3 K/UL (ref 1.7–8.2)
NEUTS SEG NFR BLD: 54 % (ref 43–78)
NRBC # BLD: 0 K/UL (ref 0–0.2)
PLATELET # BLD AUTO: 225 K/UL (ref 150–450)
PMV BLD AUTO: 10.8 FL (ref 9.4–12.3)
POTASSIUM SERPL-SCNC: 3.6 MMOL/L (ref 3.5–5.1)
PROT SERPL-MCNC: 7.1 G/DL (ref 6.3–8.2)
RBC # BLD AUTO: 4.32 M/UL (ref 4.05–5.2)
SODIUM SERPL-SCNC: 139 MMOL/L (ref 136–145)
WBC # BLD AUTO: 6.1 K/UL (ref 4.3–11.1)

## 2022-02-02 PROCEDURE — 36415 COLL VENOUS BLD VENIPUNCTURE: CPT

## 2022-02-02 PROCEDURE — 85025 COMPLETE CBC W/AUTO DIFF WBC: CPT

## 2022-02-02 PROCEDURE — 80053 COMPREHEN METABOLIC PANEL: CPT

## 2022-03-18 PROBLEM — E87.1 HYPONATREMIA: Status: ACTIVE | Noted: 2019-07-19

## 2022-03-18 PROBLEM — F33.9 RECURRENT DEPRESSION (HCC): Status: ACTIVE | Noted: 2018-01-05

## 2022-03-18 PROBLEM — E66.9 OBESITY: Status: ACTIVE | Noted: 2019-05-28

## 2022-03-19 PROBLEM — C50.412 MALIGNANT NEOPLASM OF UPPER-OUTER QUADRANT OF LEFT FEMALE BREAST (HCC): Status: ACTIVE | Noted: 2017-06-20

## 2022-03-19 PROBLEM — Z79.899 ENCOUNTER FOR LONG-TERM CURRENT USE OF MEDICATION: Status: ACTIVE | Noted: 2020-05-19

## 2022-03-19 PROBLEM — F41.0 PANIC DISORDER: Status: ACTIVE | Noted: 2019-07-30

## 2022-03-19 PROBLEM — M54.50 LOW BACK PAIN: Status: ACTIVE | Noted: 2018-11-26

## 2022-03-20 PROBLEM — Z87.898 HISTORY OF SEIZURE: Status: ACTIVE | Noted: 2020-05-19

## 2022-03-20 PROBLEM — R41.3 MEMORY PROBLEM: Status: ACTIVE | Noted: 2020-05-19

## 2022-06-20 ENCOUNTER — OFFICE VISIT (OUTPATIENT)
Dept: INTERNAL MEDICINE CLINIC | Facility: CLINIC | Age: 77
End: 2022-06-20
Payer: MEDICARE

## 2022-06-20 VITALS
HEART RATE: 76 BPM | WEIGHT: 150 LBS | HEIGHT: 63 IN | BODY MASS INDEX: 26.58 KG/M2 | SYSTOLIC BLOOD PRESSURE: 159 MMHG | DIASTOLIC BLOOD PRESSURE: 98 MMHG | RESPIRATION RATE: 16 BRPM

## 2022-06-20 DIAGNOSIS — F41.0 PANIC DISORDER: ICD-10-CM

## 2022-06-20 DIAGNOSIS — F33.9 RECURRENT DEPRESSION (HCC): ICD-10-CM

## 2022-06-20 DIAGNOSIS — I10 ESSENTIAL HYPERTENSION, BENIGN: ICD-10-CM

## 2022-06-20 DIAGNOSIS — G45.9 TIA (TRANSIENT ISCHEMIC ATTACK): Primary | ICD-10-CM

## 2022-06-20 LAB
CHOLEST SERPL-MCNC: 201 MG/DL
HDLC SERPL-MCNC: 80 MG/DL (ref 40–60)
HDLC SERPL: 2.5 {RATIO}
LDLC SERPL CALC-MCNC: 106.6 MG/DL
TRIGL SERPL-MCNC: 72 MG/DL (ref 35–150)
VLDLC SERPL CALC-MCNC: 14.4 MG/DL (ref 6–23)

## 2022-06-20 PROCEDURE — 1090F PRES/ABSN URINE INCON ASSESS: CPT | Performed by: INTERNAL MEDICINE

## 2022-06-20 PROCEDURE — G8427 DOCREV CUR MEDS BY ELIG CLIN: HCPCS | Performed by: INTERNAL MEDICINE

## 2022-06-20 PROCEDURE — G8399 PT W/DXA RESULTS DOCUMENT: HCPCS | Performed by: INTERNAL MEDICINE

## 2022-06-20 PROCEDURE — 1036F TOBACCO NON-USER: CPT | Performed by: INTERNAL MEDICINE

## 2022-06-20 PROCEDURE — G8419 CALC BMI OUT NRM PARAM NOF/U: HCPCS | Performed by: INTERNAL MEDICINE

## 2022-06-20 PROCEDURE — 99214 OFFICE O/P EST MOD 30 MIN: CPT | Performed by: INTERNAL MEDICINE

## 2022-06-20 PROCEDURE — 1123F ACP DISCUSS/DSCN MKR DOCD: CPT | Performed by: INTERNAL MEDICINE

## 2022-06-20 SDOH — ECONOMIC STABILITY: FOOD INSECURITY: WITHIN THE PAST 12 MONTHS, YOU WORRIED THAT YOUR FOOD WOULD RUN OUT BEFORE YOU GOT MONEY TO BUY MORE.: NEVER TRUE

## 2022-06-20 SDOH — ECONOMIC STABILITY: FOOD INSECURITY: WITHIN THE PAST 12 MONTHS, THE FOOD YOU BOUGHT JUST DIDN'T LAST AND YOU DIDN'T HAVE MONEY TO GET MORE.: NEVER TRUE

## 2022-06-20 ASSESSMENT — SOCIAL DETERMINANTS OF HEALTH (SDOH): HOW HARD IS IT FOR YOU TO PAY FOR THE VERY BASICS LIKE FOOD, HOUSING, MEDICAL CARE, AND HEATING?: NOT HARD AT ALL

## 2022-06-20 ASSESSMENT — ENCOUNTER SYMPTOMS
WHEEZING: 0
COUGH: 0
NAUSEA: 0
SHORTNESS OF BREATH: 0
BLOOD IN STOOL: 0
CONSTIPATION: 0
DIARRHEA: 0
VOMITING: 0

## 2022-06-20 ASSESSMENT — PATIENT HEALTH QUESTIONNAIRE - PHQ9
7. TROUBLE CONCENTRATING ON THINGS, SUCH AS READING THE NEWSPAPER OR WATCHING TELEVISION: 0
9. THOUGHTS THAT YOU WOULD BE BETTER OFF DEAD, OR OF HURTING YOURSELF: 0
SUM OF ALL RESPONSES TO PHQ QUESTIONS 1-9: 4
8. MOVING OR SPEAKING SO SLOWLY THAT OTHER PEOPLE COULD HAVE NOTICED. OR THE OPPOSITE, BEING SO FIGETY OR RESTLESS THAT YOU HAVE BEEN MOVING AROUND A LOT MORE THAN USUAL: 0
10. IF YOU CHECKED OFF ANY PROBLEMS, HOW DIFFICULT HAVE THESE PROBLEMS MADE IT FOR YOU TO DO YOUR WORK, TAKE CARE OF THINGS AT HOME, OR GET ALONG WITH OTHER PEOPLE: 2
3. TROUBLE FALLING OR STAYING ASLEEP: 1
SUM OF ALL RESPONSES TO PHQ QUESTIONS 1-9: 4
SUM OF ALL RESPONSES TO PHQ QUESTIONS 1-9: 4
5. POOR APPETITE OR OVEREATING: 0
2. FEELING DOWN, DEPRESSED OR HOPELESS: 1
SUM OF ALL RESPONSES TO PHQ QUESTIONS 1-9: 4
6. FEELING BAD ABOUT YOURSELF - OR THAT YOU ARE A FAILURE OR HAVE LET YOURSELF OR YOUR FAMILY DOWN: 0
4. FEELING TIRED OR HAVING LITTLE ENERGY: 2

## 2022-06-20 NOTE — PROGRESS NOTES
6/20/2022 2:52 PM  Location:[facility] [address] [facility] MEDICINE  [place]  Patient #:  [id number]  YOB: 1945            History of Present Illness     Chief Complaint   Patient presents with    6 Month Follow-Up     6 month f/u    Follow-Up from Hospital     was seen at [facility] 3/2022 due to slurred speech - records in chart    Depression     Feels like the medication she is on is not helping       Ms. Singh Lamb is a 69 y.o. female  who presents for follow up on chronic medical problems. Patient notes that she has been on every medication under the sun. Was hospitalized for evidently a TIA. Reviewed MRI with the patient and there were no acute ischemic findings. Also reviewed her echocardiogram and her carotid studies with her. She notes no recurrence of her headache and aphasia. She was diagnosed with a urinary tract infection while in the hospital and treated for this. Patient also notes better control of her blood pressure at home. She felt very panicked coming into the office today. Other  Associated symptoms include weakness (low exercise tolerance). Pertinent negatives include no chest pain, chills, coughing, fever, nausea, numbness or vomiting.           Allergies   Allergen Reactions    Penicillins Other (See Comments)     unknown     Past Medical History:   Diagnosis Date    Abdominal pain, right upper quadrant     Abnormal weight gain     Acute sinusitis, unspecified     Benign neoplasm of colon 2/25/2015    Benign paroxysmal positional vertigo 2/25/2015    Breast cancer ([facility] [address] 05/2017    left    Calculus of gallbladder without mention of cholecystitis or obstruction 2/25/2015    Depressive disorder, not elsewhere classified 2/25/2015    Dysuria     Essential hypertension, benign 2/25/2015    Insomnia, unspecified 2/25/2015    Nausea alone     Neoplasm of uncertain behavior of skin     Other malaise and fatigue     Primary localized osteoarthrosis, unspecified site 2/25/2015    Radiation therapy complication     Urinary tract infection, site not specified     Vaginitis and vulvovaginitis, unspecified 2/25/2015     Social History     Socioeconomic History    Marital status:      Spouse name: None    Number of children: None    Years of education: None    Highest education level: None   Occupational History    None   Tobacco Use    Smoking status: Never Smoker    Smokeless tobacco: Never Used   Substance and Sexual Activity    Alcohol use: No    Drug use: No    Sexual activity: None   Other Topics Concern    None   Social History Narrative    None     Social Determinants of Health     Financial Resource Strain: Low Risk     Difficulty of Paying Living Expenses: Not hard at all   Food Insecurity: No Food Insecurity    Worried About Running Out of Food in the Last Year: Never true    Jasiel of Food in the Last Year: Never true   Transportation Needs:     Lack of Transportation (Medical): Not on file    Lack of Transportation (Non-Medical):  Not on file   Physical Activity:     Days of Exercise per Week: Not on file    Minutes of Exercise per Session: Not on file   Stress:     Feeling of Stress : Not on file   Social Connections:     Frequency of Communication with Friends and Family: Not on file    Frequency of Social Gatherings with Friends and Family: Not on file    Attends Sabianism Services: Not on file    Active Member of [address] [place] [address] or Organizations: Not on file    Attends Club or Organization Meetings: Not on file    Marital Status: Not on file   Intimate Partner Violence:     Fear of Current or Ex-Partner: Not on file    Emotionally Abused: Not on file    Physically Abused: Not on file    Sexually Abused: Not on file   Housing Stability:     Unable to Pay for Housing in the Last Year: Not on file    Number of [place] in the Last Year: Not on file    Unstable Housing in the Last Year: Not on file Past Surgical History:   Procedure Laterality Date    BREAST BIOPSY Left 04/03/2019    US Core Bx (minimal intraductal hyperplasia    BREAST BIOPSY Left 04/10/2017    US Core BX  (positive)    BREAST BIOPSY Left 05/05/2017    Needle Local    BREAST LUMPECTOMY Left 5/5/2017    LEFT BREAST NEEDLE LOCALIZED LUMPECTOMY performed by Charl Riedel, MD at [facility] LUMPECTOMY Right     benign per pt    US BREAST NEEDLE BIOPSY LEFT Left 4/3/2019    US BREAST NEEDLE BIOPSY LEFT 4/3/2019 [facility]     Current Outpatient Medications   Medication Sig Dispense Refill    anastrozole (ARIMIDEX) 1 MG tablet Take 1 mg by mouth daily      LORazepam (ATIVAN) 1 MG tablet TAKE 1 TABLET BY MOUTH TWICE DAILY AS NEEDED FOR ANXIETY      QUEtiapine (SEROQUEL) 50 MG tablet TAKE 3 TABLETS BY MOUTH AT BEDTIME      temazepam (RESTORIL) 30 MG capsule Take by mouth.  venlafaxine (EFFEXOR XR) 150 MG extended release capsule TAKE 1 CAPSULE BY MOUTH EVERY DAY WITH FOOD       No current facility-administered medications for this visit. Health Maintenance   Topic Date Due    COVID-19 Vaccine (1) 05/01/1950    Hepatitis C screen  Never done    Shingles vaccine (1 of 2) Never done   [employer] Visit (AWV)  12/21/2022    Depression Monitoring  02/02/2023    DTaP/Tdap/Td vaccine (2 - Td or Tdap) 12/07/2025    DEXA (modify frequency per FRAX score)  Completed    Flu vaccine  Completed    Pneumococcal 65+ years Vaccine  Completed    Hepatitis A vaccine  Aged Out    Hepatitis B vaccine  Aged Out    Hib vaccine  Aged Out    Meningococcal (ACWY) vaccine  Aged Out     Family History   Problem Relation Age of Onset    Cancer Maternal Aunt         breast, great aunt    Cancer Maternal Grandmother         great grandmother, breast    Breast Cancer Maternal Grandmother              Review of Systems  Review of Systems   Constitutional: Negative for chills and fever.    Respiratory: Negative for cough, shortness of breath and wheezing. Cardiovascular: Negative for chest pain, palpitations and leg swelling. Gastrointestinal: Negative for blood in stool, constipation, diarrhea, nausea and vomiting. Genitourinary: Negative for difficulty urinating, dysuria and hematuria. Neurological: Positive for weakness (low exercise tolerance). Negative for numbness. Psychiatric/Behavioral: Positive for dysphoric mood. The patient is nervous/anxious. BP (!) 159/98 Comment: rechecked  Pulse 76   Resp 16   Ht 5' 3\" (1.6 m)   Wt 150 lb (68 kg)   BMI 26.57 kg/m²       Physical Exam    Physical Exam  Constitutional:       Appearance: Normal appearance. She is not ill-appearing. HENT:      Head: Normocephalic. Neck:      Vascular: No carotid bruit. Cardiovascular:      Rate and Rhythm: Normal rate and regular rhythm. Pulmonary:      Effort: Pulmonary effort is normal.      Breath sounds: Normal breath sounds. No wheezing. Abdominal:      General: Abdomen is flat. Palpations: Abdomen is soft. Tenderness: There is no abdominal tenderness. Musculoskeletal:      Right lower leg: No edema. Left lower leg: No edema. Neurological:      Mental Status: She is alert and oriented to person, place, and time. Deep Tendon Reflexes:      Reflex Scores:       Patellar reflexes are 2+ on the right side and 2+ on the left side. Psychiatric:         Mood and Affect: Mood is anxious. Behavior: Behavior normal.           Assessment & Plan    Current Outpatient Medications   Medication Sig Dispense Refill    anastrozole (ARIMIDEX) 1 MG tablet Take 1 mg by mouth daily      LORazepam (ATIVAN) 1 MG tablet TAKE 1 TABLET BY MOUTH TWICE DAILY AS NEEDED FOR ANXIETY      QUEtiapine (SEROQUEL) 50 MG tablet TAKE 3 TABLETS BY MOUTH AT BEDTIME      temazepam (RESTORIL) 30 MG capsule Take by mouth.       venlafaxine (EFFEXOR XR) 150 MG extended release capsule TAKE 1 CAPSULE BY MOUTH EVERY DAY WITH FOOD       No current facility-administered medications for this visit. Orders Placed This Encounter   Procedures    Lipid Panel     Standing Status:   Future     Number of Occurrences:   1     Standing Expiration Date:   6/20/2023     Order Specific Question:   Is Patient Fasting?/# of Hours     Answer:   no         No orders of the defined types were placed in this encounter. There are no discontinued medications. Diagnosis Orders   1. TIA (transient ischemic attack)  Lipid Panel    Lipid Panel   2. Essential hypertension, benign     3. Recurrent depression ([facility] [address]     4. Panic disorder        Will discuss labs over the phone. Reviewed hospital records as above. We will also obtain GeneSight testing. We will determine if adjusting her medications could help her based on these findings. Urged patient to start riding her stationary bike daily and to try to work up to 15 minutes a day for physical as well as emotional reasons. Document BP twice weekly. Contact office if not <=130/80 consistently. Knows to keep a low threshold for contacting the office with worsening symptoms. Follow up as documented or earlier as needed. Call is worse or no better in the next 2 weeks. Return in about 6 months (around 12/20/2022) for JOSELINE YI.           Breann Hernandez MD

## 2022-06-21 ENCOUNTER — TELEPHONE (OUTPATIENT)
Dept: INTERNAL MEDICINE CLINIC | Facility: CLINIC | Age: 77
End: 2022-06-21

## 2022-06-21 NOTE — RESULT ENCOUNTER NOTE
Your cholesterol is up a little bit. Exercise will help with this as well. Maintain a low fat high fiber diet and make sure you are getting 30 minutes of aerobic exercise at least 4-5 days weekly. Thanks.   Tiffani Sarkar

## 2022-06-21 NOTE — TELEPHONE ENCOUNTER
----- Message from Natalia Boxer, MD sent at 6/21/2022 12:37 PM EDT -----  Your cholesterol is up a little bit. Exercise will help with this as well. Maintain a low fat high fiber diet and make sure you are getting 30 minutes of aerobic exercise at least 4-5 days weekly. Thanks.   Tiffani Sarkar

## 2022-07-11 ENCOUNTER — HOSPITAL ENCOUNTER (OUTPATIENT)
Dept: MAMMOGRAPHY | Age: 77
Discharge: HOME | End: 2022-07-14
Payer: MEDICARE

## 2022-07-11 DIAGNOSIS — Z12.31 VISIT FOR SCREENING MAMMOGRAM: ICD-10-CM

## 2022-07-11 PROCEDURE — 77067 SCR MAMMO BI INCL CAD: CPT

## 2022-07-14 DIAGNOSIS — C50.412 MALIGNANT NEOPLASM OF UPPER-OUTER QUADRANT OF LEFT FEMALE BREAST, UNSPECIFIED ESTROGEN RECEPTOR STATUS (HCC): Primary | ICD-10-CM

## 2022-07-18 ENCOUNTER — HOSPITAL ENCOUNTER (OUTPATIENT)
Dept: LAB | Age: 77
Discharge: HOME | End: 2022-07-21
Payer: MEDICARE

## 2022-07-18 ENCOUNTER — OFFICE VISIT (OUTPATIENT)
Dept: ONCOLOGY | Age: 77
End: 2022-07-18
Payer: MEDICARE

## 2022-07-18 VITALS
HEIGHT: 63 IN | DIASTOLIC BLOOD PRESSURE: 88 MMHG | HEART RATE: 97 BPM | TEMPERATURE: 98.3 F | RESPIRATION RATE: 20 BRPM | OXYGEN SATURATION: 97 % | BODY MASS INDEX: 26.7 KG/M2 | SYSTOLIC BLOOD PRESSURE: 134 MMHG | WEIGHT: 150.7 LBS

## 2022-07-18 DIAGNOSIS — Z51.81 ENCOUNTER FOR MONITORING AROMATASE INHIBITOR THERAPY: ICD-10-CM

## 2022-07-18 DIAGNOSIS — Z12.31 SCREENING MAMMOGRAM FOR HIGH-RISK PATIENT: ICD-10-CM

## 2022-07-18 DIAGNOSIS — Z79.811 ENCOUNTER FOR MONITORING AROMATASE INHIBITOR THERAPY: ICD-10-CM

## 2022-07-18 DIAGNOSIS — C50.412 MALIGNANT NEOPLASM OF UPPER-OUTER QUADRANT OF LEFT FEMALE BREAST, UNSPECIFIED ESTROGEN RECEPTOR STATUS (HCC): ICD-10-CM

## 2022-07-18 DIAGNOSIS — C50.412 MALIGNANT NEOPLASM OF UPPER-OUTER QUADRANT OF LEFT FEMALE BREAST, UNSPECIFIED ESTROGEN RECEPTOR STATUS (HCC): Primary | ICD-10-CM

## 2022-07-18 LAB
ALBUMIN SERPL-MCNC: 3.8 G/DL (ref 3.2–4.6)
ALBUMIN/GLOB SERPL: 1.1 {RATIO} (ref 1.2–3.5)
ALP SERPL-CCNC: 80 U/L (ref 50–136)
ALT SERPL-CCNC: 20 U/L (ref 12–65)
ANION GAP SERPL CALC-SCNC: 5 MMOL/L (ref 7–16)
AST SERPL-CCNC: 17 U/L (ref 15–37)
BASOPHILS # BLD: 0 K/UL (ref 0–0.2)
BASOPHILS NFR BLD: 1 % (ref 0–2)
BILIRUB SERPL-MCNC: 0.3 MG/DL (ref 0.2–1.1)
BUN SERPL-MCNC: 17 MG/DL (ref 8–23)
CALCIUM SERPL-MCNC: 9.3 MG/DL (ref 8.3–10.4)
CHLORIDE SERPL-SCNC: 101 MMOL/L (ref 98–107)
CO2 SERPL-SCNC: 29 MMOL/L (ref 21–32)
CREAT SERPL-MCNC: 1 MG/DL (ref 0.6–1)
DIFFERENTIAL METHOD BLD: NORMAL
EOSINOPHIL # BLD: 0.2 K/UL (ref 0–0.8)
EOSINOPHIL NFR BLD: 2 % (ref 0.5–7.8)
ERYTHROCYTE [DISTWIDTH] IN BLOOD BY AUTOMATED COUNT: 13.4 % (ref 11.9–14.6)
GLOBULIN SER CALC-MCNC: 3.5 G/DL (ref 2.3–3.5)
GLUCOSE SERPL-MCNC: 100 MG/DL (ref 65–100)
HCT VFR BLD AUTO: 41.3 % (ref 35.8–46.3)
HGB BLD-MCNC: 13.4 G/DL (ref 11.7–15.4)
IMM GRANULOCYTES # BLD AUTO: 0 K/UL (ref 0–0.5)
IMM GRANULOCYTES NFR BLD AUTO: 0 % (ref 0–5)
LYMPHOCYTES # BLD: 2.3 K/UL (ref 0.5–4.6)
LYMPHOCYTES NFR BLD: 34 % (ref 13–44)
MCH RBC QN AUTO: 30.5 PG (ref 26.1–32.9)
MCHC RBC AUTO-ENTMCNC: 32.4 G/DL (ref 31.4–35)
MCV RBC AUTO: 93.9 FL (ref 79.6–97.8)
MONOCYTES # BLD: 0.6 K/UL (ref 0.1–1.3)
MONOCYTES NFR BLD: 9 % (ref 4–12)
NEUTS SEG # BLD: 3.6 K/UL (ref 1.7–8.2)
NEUTS SEG NFR BLD: 55 % (ref 43–78)
NRBC # BLD: 0 K/UL (ref 0–0.2)
PLATELET # BLD AUTO: 233 K/UL (ref 150–450)
PMV BLD AUTO: 10.8 FL (ref 9.4–12.3)
POTASSIUM SERPL-SCNC: 4.9 MMOL/L (ref 3.5–5.1)
PROT SERPL-MCNC: 7.3 G/DL (ref 6.3–8.2)
RBC # BLD AUTO: 4.4 M/UL (ref 4.05–5.2)
SODIUM SERPL-SCNC: 135 MMOL/L (ref 136–145)
WBC # BLD AUTO: 6.6 K/UL (ref 4.3–11.1)

## 2022-07-18 PROCEDURE — 80053 COMPREHEN METABOLIC PANEL: CPT

## 2022-07-18 PROCEDURE — 1123F ACP DISCUSS/DSCN MKR DOCD: CPT | Performed by: INTERNAL MEDICINE

## 2022-07-18 PROCEDURE — G8399 PT W/DXA RESULTS DOCUMENT: HCPCS | Performed by: INTERNAL MEDICINE

## 2022-07-18 PROCEDURE — G8428 CUR MEDS NOT DOCUMENT: HCPCS | Performed by: INTERNAL MEDICINE

## 2022-07-18 PROCEDURE — 1036F TOBACCO NON-USER: CPT | Performed by: INTERNAL MEDICINE

## 2022-07-18 PROCEDURE — 1090F PRES/ABSN URINE INCON ASSESS: CPT | Performed by: INTERNAL MEDICINE

## 2022-07-18 PROCEDURE — 99214 OFFICE O/P EST MOD 30 MIN: CPT | Performed by: INTERNAL MEDICINE

## 2022-07-18 PROCEDURE — 36415 COLL VENOUS BLD VENIPUNCTURE: CPT

## 2022-07-18 PROCEDURE — G8417 CALC BMI ABV UP PARAM F/U: HCPCS | Performed by: INTERNAL MEDICINE

## 2022-07-18 PROCEDURE — 85025 COMPLETE CBC W/AUTO DIFF WBC: CPT

## 2022-07-18 ASSESSMENT — PATIENT HEALTH QUESTIONNAIRE - PHQ9
SUM OF ALL RESPONSES TO PHQ QUESTIONS 1-9: 3
2. FEELING DOWN, DEPRESSED OR HOPELESS: 3
SUM OF ALL RESPONSES TO PHQ QUESTIONS 1-9: 3

## 2022-07-18 NOTE — PATIENT INSTRUCTIONS
Patient Instructions from Today's Visit    Reason for Visit:  Follow up - Breast    Diagnosis Information:  [email or web address]      Plan:  - Congratulations, you have made it to your 5 year radha!!      Follow Up:  - 1 year    Recent Lab Results:  Hospital Outpatient Visit on 07/18/2022   Component Date Value Ref Range Status    WBC 07/18/2022 6.6  4.3 - 11.1 K/uL Final    RBC 07/18/2022 4.40  4.05 - 5.2 M/uL Final    Hemoglobin 07/18/2022 13.4  11.7 - 15.4 g/dL Final    Hematocrit 07/18/2022 41.3  35.8 - 46.3 % Final    MCV 07/18/2022 93.9  79.6 - 97.8 FL Final    MCH 07/18/2022 30.5  26.1 - 32.9 PG Final    MCHC 07/18/2022 32.4  31.4 - 35.0 g/dL Final    RDW 07/18/2022 13.4  11.9 - 14.6 % Final    Platelets 25/17/8271 233  150 - 450 K/uL Final    MPV 07/18/2022 10.8  9.4 - 12.3 FL Final    nRBC 07/18/2022 0.00  0.0 - 0.2 K/uL Final    **Note: Absolute NRBC parameter is now reported with Hemogram**    Differential Type 07/18/2022 AUTOMATED    Final    Seg Neutrophils 07/18/2022 55  43 - 78 % Final    Lymphocytes 07/18/2022 34  13 - 44 % Final    Monocytes 07/18/2022 9  4.0 - 12.0 % Final    Eosinophils % 07/18/2022 2  0.5 - 7.8 % Final    Basophils 07/18/2022 1  0.0 - 2.0 % Final    Immature Granulocytes 07/18/2022 0  0.0 - 5.0 % Final    Segs Absolute 07/18/2022 3.6  1.7 - 8.2 K/UL Final    Absolute Lymph # 07/18/2022 2.3  0.5 - 4.6 K/UL Final    Absolute Mono # 07/18/2022 0.6  0.1 - 1.3 K/UL Final    Absolute Eos # 07/18/2022 0.2  0.0 - 0.8 K/UL Final    Basophils Absolute 07/18/2022 0.0  0.0 - 0.2 K/UL Final    Absolute Immature Granulocyte 07/18/2022 0.0  0.0 - 0.5 K/UL Final    Sodium 07/18/2022 135 (A) 136 - 145 mmol/L Final    Potassium 07/18/2022 4.9  3.5 - 5.1 mmol/L Final    Chloride 07/18/2022 101  98 - 107 mmol/L Final    CO2 07/18/2022 29  21 - 32 mmol/L Final    Anion Gap 07/18/2022 5 (A) 7 - 16 mmol/L Final    Glucose 07/18/2022 100  65 - 100 mg/dL Final    BUN 07/18/2022 17  8 - 23 MG/DL Final    CREATININE 07/18/2022 1.00  0.6 - 1.0 MG/DL Final    GFR  07/18/2022 >60  >60 ml/min/1.73m2 Final    GFR Non- 07/18/2022 57 (A) >60 ml/min/1.73m2 Final    Comment:      Estimated GFR is calculated using the Modification of Diet in Renal Disease (MDRD) Study equation, reported for both  Americans (GFRAA) and non- Americans (GFRNA), and normalized to 1.73m2 body surface area. The physician must decide which value applies to the patient. The MDRD study equation should only be used in individuals age 25 or older. It has not been validated for the following: pregnant women, patients with serious comorbid conditions,or on certain medications, or persons with extremes of body size, muscle mass, or nutritional status. Calcium 07/18/2022 9.3  8.3 - 10.4 MG/DL Final    Total Bilirubin 07/18/2022 0.3  0.2 - 1.1 MG/DL Final    ALT 07/18/2022 20  12 - 65 U/L Final    AST 07/18/2022 17  15 - 37 U/L Final    Alk Phosphatase 07/18/2022 80  50 - 136 U/L Final    Total Protein 07/18/2022 7.3  6.3 - 8.2 g/dL Final    Albumin 07/18/2022 3.8  3.2 - 4.6 g/dL Final    Globulin 07/18/2022 3.5  2.3 - 3.5 g/dL Final    Albumin/Globulin Ratio 07/18/2022 1.1 (A) 1.2 - 3.5   Final         Treatment Summary has been discussed and given to patient: n/a        -------------------------------------------------------------------------------------------------------------------  Please call our office at [phone] if you have any  of the following symptoms:   Fever of 100.5 or greater  Chills  Shortness of breath  Swelling or pain in one leg    After office hours an answering service is available and will contact a provider for emergencies or if you are experiencing any of the above symptoms. Patient did express an interest in My Chart.   My Chart log in information explained on the after visit summary printout at the check-out desk.     Sugey Woods RN

## 2022-07-18 NOTE — PROGRESS NOTES
[facility] Hematology and Oncology: Office Visit Established Patient    Chief Complaint:    Chief Complaint   Patient presents with    Follow-up         History of Present Illness:  Ms. Vicki Aaron is a 69 y.o. female who returns today for management of breast cancer. She missed her mammogram in 2016 and was found to have a breast mass by mammogram on 3/25/2017, this was undetectable. She was sent for an ultrasound guided biopsy on 4/10/2017 with pathology positive for  breast cancer, ER/CO positive and HER2 positive. She was then referred to Dr. Lonzell Kayser and underwent a left breast needle localized lumpectomy with sentinel lymph node biopsy on 5/5/2017, which showed the tumor to be 1.1 cm with 2 negative sentinel nodes. She presents to oncology to discuss options for adjuvant therapy. Because of the HER2 positive disease, we recommended adjuvant paclitaxel and trastuzumab, then endocrine therapy. She decided against chemotherapy but did proceed with radiation therapy. She then started Arimidex. Here for follow-up and to discuss tolerance to Arimidex. She has been doing fine from a breast cancer standpoint, her chronic depression remains an ongoing issue. She remains on Arimidex without any reports of side effects, she does not feel this is contributing to her depression as her symptoms predated her AI usage. No MSK symptoms, no bone pain. She denies any breast concerns. Review of Systems:  Constitutional: Negative. HENT: Negative. Eyes: Negative. Respiratory: Negative. Cardiovascular: Negative. Gastrointestinal: Negative. Genitourinary: Negative. Musculoskeletal: Negative. Skin: Negative. Neurological: Negative. Endo/Heme/Allergies: Negative. Psychiatric/Behavioral: Negative. All other systems reviewed and are negative.        Allergies   Allergen Reactions    Penicillins Other (See Comments)     unknown     Past Medical History:   Diagnosis Date    Abdominal pain, right upper quadrant     Abnormal weight gain     Acute sinusitis, unspecified     Benign neoplasm of colon 2/25/2015    Benign paroxysmal positional vertigo 2/25/2015    Breast cancer ([facility] [address] 05/2017    left    Calculus of gallbladder without mention of cholecystitis or obstruction 2/25/2015    Depressive disorder, not elsewhere classified 2/25/2015    Dysuria     Essential hypertension, benign 2/25/2015    Insomnia, unspecified 2/25/2015    Nausea alone     Neoplasm of uncertain behavior of skin     Other malaise and fatigue     Primary localized osteoarthrosis, unspecified site 2/25/2015    Radiation therapy complication     Urinary tract infection, site not specified     Vaginitis and vulvovaginitis, unspecified 2/25/2015     Past Surgical History:   Procedure Laterality Date    BREAST BIOPSY Left 04/03/2019    US Core Bx (minimal intraductal hyperplasia    BREAST BIOPSY Left 04/10/2017    US Core BX  (positive)    BREAST BIOPSY Left 05/05/2017    Needle Local    BREAST LUMPECTOMY Left 5/5/2017    LEFT BREAST NEEDLE LOCALIZED LUMPECTOMY performed by Kenisha Weathers MD at [id number] [place] [address] LUMPECTOMY Right     benign per pt    US BREAST NEEDLE BIOPSY LEFT Left 4/3/2019    US BREAST NEEDLE BIOPSY LEFT 4/3/2019 [facility]     Family History   Problem Relation Age of Onset    Cancer Maternal Aunt         breast, great aunt    Cancer Maternal Grandmother         great grandmother, breast    Breast Cancer Maternal Grandmother      Social History     Socioeconomic History    Marital status:      Spouse name: Not on file    Number of children: Not on file    Years of education: Not on file    Highest education level: Not on file   Occupational History    Not on file   Tobacco Use    Smoking status: Never    Smokeless tobacco: Never   Substance and Sexual Activity    Alcohol use: No    Drug use: No    Sexual activity: Not on file   Other Topics Concern    Not on file   Social History Narrative    Not on file Social Determinants of Health     Financial Resource Strain: Low Risk     Difficulty of Paying Living Expenses: Not hard at all   Food Insecurity: No Food Insecurity    Worried About Running Out of Food in the Last Year: Never true    Ran Out of Food in the Last Year: Never true   Transportation Needs: Not on file   Physical Activity: Not on file   Stress: Not on file   Social Connections: Not on file   Intimate Partner Violence: Not on file   Housing Stability: Not on file     Current Outpatient Medications   Medication Sig Dispense Refill    LORazepam (ATIVAN) 1 MG tablet TAKE 1 TABLET BY MOUTH TWICE DAILY AS NEEDED FOR ANXIETY      QUEtiapine (SEROQUEL) 50 MG tablet TAKE 3 TABLETS BY MOUTH AT BEDTIME      temazepam (RESTORIL) 30 MG capsule Take by mouth. venlafaxine (EFFEXOR XR) 150 MG extended release capsule TAKE 1 CAPSULE BY MOUTH EVERY DAY WITH FOOD      anastrozole (ARIMIDEX) 1 MG tablet Take 1 mg by mouth daily (Patient not taking: Reported on 7/18/2022)       No current facility-administered medications for this visit. OBJECTIVE:  /88 (Site: Left Upper Arm, Position: Standing)   Pulse 97   Temp 98.3 °F (36.8 °C)   Resp 20   Ht 5' 3\" (1.6 m)   Wt 150 lb 11.2 oz (68.4 kg)   SpO2 97%   BMI 26.70 kg/m²     Physical Exam:  Constitutional: Well developed, well nourished female in no acute distress, sitting comfortably in the exam room chair. HEENT: Normocephalic and atraumatic. Sclerae anicteric. Neck supple without JVD. No thyromegaly present. Lymph node   No palpable submandibular, cervical, supraclavicular lymph nodes. Skin Warm and dry. No bruising and no rash noted. No erythema. No pallor. Respiratory Lungs are clear to auscultation bilaterally without wheezes, rales or rhonchi, normal air exchange without accessory muscle use. CVS Normal rate, regular rhythm and normal S1 and S2. No murmurs, gallops, or rubs.    Abdomen Soft, nontender and nondistended, normoactive bowel sounds. No palpable mass. No hepatosplenomegaly. Neuro Grossly nonfocal with no obvious sensory or motor deficits. MSK Normal range of motion in general.  No edema and no tenderness. Psych Appropriate mood and affect.       Labs:  Recent Results (from the past 96 hour(s))   CBC with Auto Differential    Collection Time: 07/18/22  2:17 PM   Result Value Ref Range    WBC 6.6 4.3 - 11.1 K/uL    RBC 4.40 4.05 - 5.2 M/uL    Hemoglobin 13.4 11.7 - 15.4 g/dL    Hematocrit 41.3 35.8 - 46.3 %    MCV 93.9 79.6 - 97.8 FL    MCH 30.5 26.1 - 32.9 PG    MCHC 32.4 31.4 - 35.0 g/dL    RDW 13.4 11.9 - 14.6 %    Platelets 977 341 - 030 K/uL    MPV 10.8 9.4 - 12.3 FL    nRBC 0.00 0.0 - 0.2 K/uL    Differential Type AUTOMATED      Seg Neutrophils 55 43 - 78 %    Lymphocytes 34 13 - 44 %    Monocytes 9 4.0 - 12.0 %    Eosinophils % 2 0.5 - 7.8 %    Basophils 1 0.0 - 2.0 %    Immature Granulocytes 0 0.0 - 5.0 %    Segs Absolute 3.6 1.7 - 8.2 K/UL    Absolute Lymph # 2.3 0.5 - 4.6 K/UL    Absolute Mono # 0.6 0.1 - 1.3 K/UL    Absolute Eos # 0.2 0.0 - 0.8 K/UL    Basophils Absolute 0.0 0.0 - 0.2 K/UL    Absolute Immature Granulocyte 0.0 0.0 - 0.5 K/UL   Comprehensive Metabolic Panel    Collection Time: 07/18/22  2:17 PM   Result Value Ref Range    Sodium 135 (L) 136 - 145 mmol/L    Potassium 4.9 3.5 - 5.1 mmol/L    Chloride 101 98 - 107 mmol/L    CO2 29 21 - 32 mmol/L    Anion Gap 5 (L) 7 - 16 mmol/L    Glucose 100 65 - 100 mg/dL    BUN 17 8 - 23 MG/DL    CREATININE 1.00 0.6 - 1.0 MG/DL    GFR African American >60 >60 ml/min/1.73m2    GFR Non- 57 (L) >60 ml/min/1.73m2    Calcium 9.3 8.3 - 10.4 MG/DL    Total Bilirubin 0.3 0.2 - 1.1 MG/DL    ALT 20 12 - 65 U/L    AST 17 15 - 37 U/L    Alk Phosphatase 80 50 - 136 U/L    Total Protein 7.3 6.3 - 8.2 g/dL    Albumin 3.8 3.2 - 4.6 g/dL    Globulin 3.5 2.3 - 3.5 g/dL    Albumin/Globulin Ratio 1.1 (L) 1.2 - 3.5         Imaging:  LAM DIGITAL SCREEN W OR WO CAD BILATERAL    Result Date: 7/12/2022  No mammographic evidence of malignancy. Recommend mammogram in one year. Reminder letter will be scheduled. BI-RADS Assessment Category 1: Negative. BD2         ASSESSMENT:   Diagnosis Orders   1. Malignant neoplasm of upper-outer quadrant of left female breast, unspecified estrogen receptor status ([facility] [address]        2. Screening mammogram for high-risk patient  LAM DIGITAL SCREEN W OR WO CAD BILATERAL      3. Encounter for monitoring aromatase inhibitor therapy              PLAN:  Lab studies were personally reviewed. Breast cancer: 1.1 cm, g1 IDC, ER 97%, UT 86%, HER2 3+ by IHC. S/p lumpectomy with 0/2 sentinel nodes positive. Stage IA ([id number]). Her tumor  is small, but HER2 positive, which suggests an increased risk of recurrence, unless she receives chemoimmunotherapy. T1N0 disease was confirmed at surgery, so she would be an ideal candidate for the APT regimen with weekly paclitaxel + trastuzumab for  12 weeks, followed by Herceptin monotherapy to complete a year. She refused chemotherapy because of her clinical depression. She did however complete radiation therapy. Started on Arimidex July 2017. Here for follow-up and to discuss tolerance to Arimidex. She has been doing fine from a breast cancer standpoint, her chronic depression remains an ongoing issue. She remains on Arimidex without any reports of side effects, she does not feel this is contributing to her depression as her symptoms predated her AI usage. No MSK symptoms, no bone pain. She denies any breast concerns. Labs reviewed and adequate. Mammogram July 2022 unremarkable, repeat July 2023  DEXA July 2021 showed mild osteopenia, continue Ca+D. She has now completed 5 years of therapy, I am comfortable with stopping treatment as she had a small tumor and she has symptoms that may be worsened with ongoing therapy (including osteopenia and mood symptoms). She is in agreement to stop treatment. All questions were asked and answered to the best of my ability. F/u in 1 year for recheck on AI.                Bree Gonzales MD, MD  [facility] Hematology and Oncology  [address] [place] [address]  Office : [phone]  Fax : [phone]

## 2022-08-24 ENCOUNTER — TELEPHONE (OUTPATIENT)
Dept: INTERNAL MEDICINE CLINIC | Facility: CLINIC | Age: 77
End: 2022-08-24

## 2022-08-24 NOTE — TELEPHONE ENCOUNTER
COVID-19        Are you experiencing any of the below symptoms? Fever or Chills yes If yes, what is your temperature? N/a     Cough? yes Is it dry or productive?yes If productive, what color is the mucus? no     Shortness of breath or difficulty Breathing? no Have you checked your O2 Sats? no If so what are the readings? no     Fatigue? yes     Muscle or Body Aches? yes     Headaches? yes     New loss of taste or smell? no     Sore throat? yes     Congestion or runny nose? yes      Nausea or Vomiting? no     Diarrhea? no        When did you start experiencing the above symptoms? thursday      Have you had a Covid Test Done? yes  If Yes, what where the results positive  When did you take the test? sunday  Was it a home test or did you go to an urgent care/Facility to have the test performed? home        Are you vaccinated? yes  Did you receive the Booster Vaccines?  no

## 2022-08-25 RX ORDER — PREDNISONE 10 MG/1
10 TABLET ORAL 2 TIMES DAILY
Qty: 10 TABLET | Refills: 0 | Status: SHIPPED | OUTPATIENT
Start: 2022-08-25 | End: 2022-08-30

## 2022-08-25 RX ORDER — DOXYCYCLINE HYCLATE 100 MG
100 TABLET ORAL 2 TIMES DAILY
Qty: 14 TABLET | Refills: 0 | Status: SHIPPED | OUTPATIENT
Start: 2022-08-25 | End: 2022-09-01

## 2022-08-25 NOTE — TELEPHONE ENCOUNTER
Pt has been informed.  Pt state that her symptoms have not improve and is requesting a steroid and antibiotic be sent into [facility] in [place]

## 2022-12-19 ASSESSMENT — LIFESTYLE VARIABLES
HOW OFTEN DO YOU HAVE A DRINK CONTAINING ALCOHOL: NEVER
HOW MANY STANDARD DRINKS CONTAINING ALCOHOL DO YOU HAVE ON A TYPICAL DAY: PATIENT DOES NOT DRINK

## 2022-12-19 ASSESSMENT — PATIENT HEALTH QUESTIONNAIRE - PHQ9
SUM OF ALL RESPONSES TO PHQ QUESTIONS 1-9: 2
SUM OF ALL RESPONSES TO PHQ9 QUESTIONS 1 & 2: 2
SUM OF ALL RESPONSES TO PHQ QUESTIONS 1-9: 2
2. FEELING DOWN, DEPRESSED OR HOPELESS: 1
SUM OF ALL RESPONSES TO PHQ QUESTIONS 1-9: 2
1. LITTLE INTEREST OR PLEASURE IN DOING THINGS: 1
SUM OF ALL RESPONSES TO PHQ QUESTIONS 1-9: 2

## 2022-12-20 ENCOUNTER — OFFICE VISIT (OUTPATIENT)
Dept: INTERNAL MEDICINE CLINIC | Facility: CLINIC | Age: 77
End: 2022-12-20
Payer: MEDICARE

## 2022-12-20 VITALS
HEART RATE: 96 BPM | RESPIRATION RATE: 18 BRPM | BODY MASS INDEX: 25.87 KG/M2 | HEIGHT: 63 IN | WEIGHT: 146 LBS | SYSTOLIC BLOOD PRESSURE: 146 MMHG | DIASTOLIC BLOOD PRESSURE: 94 MMHG

## 2022-12-20 DIAGNOSIS — I10 ESSENTIAL HYPERTENSION, BENIGN: Primary | ICD-10-CM

## 2022-12-20 DIAGNOSIS — Z85.3 PERSONAL HISTORY OF BREAST CANCER: ICD-10-CM

## 2022-12-20 DIAGNOSIS — Z00.00 MEDICARE ANNUAL WELLNESS VISIT, SUBSEQUENT: ICD-10-CM

## 2022-12-20 DIAGNOSIS — M19.91 PRIMARY LOCALIZED OSTEOARTHROSIS: ICD-10-CM

## 2022-12-20 DIAGNOSIS — H69.81 ACUTE DYSFUNCTION OF RIGHT EUSTACHIAN TUBE: ICD-10-CM

## 2022-12-20 DIAGNOSIS — F41.0 PANIC DISORDER: ICD-10-CM

## 2022-12-20 DIAGNOSIS — F33.9 RECURRENT DEPRESSION (HCC): ICD-10-CM

## 2022-12-20 DIAGNOSIS — F51.01 PRIMARY INSOMNIA: ICD-10-CM

## 2022-12-20 LAB
ALBUMIN SERPL-MCNC: 3.9 G/DL (ref 3.2–4.6)
ALBUMIN/GLOB SERPL: 1.2 {RATIO} (ref 0.4–1.6)
ALP SERPL-CCNC: 93 U/L (ref 50–136)
ALT SERPL-CCNC: 27 U/L (ref 12–65)
ANION GAP SERPL CALC-SCNC: 7 MMOL/L (ref 2–11)
AST SERPL-CCNC: 23 U/L (ref 15–37)
BASOPHILS # BLD: 0 K/UL (ref 0–0.2)
BASOPHILS NFR BLD: 1 % (ref 0–2)
BILIRUB SERPL-MCNC: 0.3 MG/DL (ref 0.2–1.1)
BUN SERPL-MCNC: 15 MG/DL (ref 8–23)
CALCIUM SERPL-MCNC: 9.2 MG/DL (ref 8.3–10.4)
CHLORIDE SERPL-SCNC: 101 MMOL/L (ref 101–110)
CO2 SERPL-SCNC: 28 MMOL/L (ref 21–32)
CREAT SERPL-MCNC: 1 MG/DL (ref 0.6–1)
DIFFERENTIAL METHOD BLD: NORMAL
EOSINOPHIL # BLD: 0.1 K/UL (ref 0–0.8)
EOSINOPHIL NFR BLD: 1 % (ref 0.5–7.8)
ERYTHROCYTE [DISTWIDTH] IN BLOOD BY AUTOMATED COUNT: 13.1 % (ref 11.9–14.6)
GLOBULIN SER CALC-MCNC: 3.2 G/DL (ref 2.8–4.5)
GLUCOSE SERPL-MCNC: 87 MG/DL (ref 65–100)
HCT VFR BLD AUTO: 44 % (ref 35.8–46.3)
HGB BLD-MCNC: 14.2 G/DL (ref 11.7–15.4)
IMM GRANULOCYTES # BLD AUTO: 0 K/UL (ref 0–0.5)
IMM GRANULOCYTES NFR BLD AUTO: 0 % (ref 0–5)
LYMPHOCYTES # BLD: 1.9 K/UL (ref 0.5–4.6)
LYMPHOCYTES NFR BLD: 25 % (ref 13–44)
MCH RBC QN AUTO: 31.1 PG (ref 26.1–32.9)
MCHC RBC AUTO-ENTMCNC: 32.3 G/DL (ref 31.4–35)
MCV RBC AUTO: 96.3 FL (ref 82–102)
MONOCYTES # BLD: 0.5 K/UL (ref 0.1–1.3)
MONOCYTES NFR BLD: 7 % (ref 4–12)
NEUTS SEG # BLD: 4.9 K/UL (ref 1.7–8.2)
NEUTS SEG NFR BLD: 66 % (ref 43–78)
NRBC # BLD: 0 K/UL (ref 0–0.2)
PLATELET # BLD AUTO: 253 K/UL (ref 150–450)
PMV BLD AUTO: 11.4 FL (ref 9.4–12.3)
POTASSIUM SERPL-SCNC: 4 MMOL/L (ref 3.5–5.1)
PROT SERPL-MCNC: 7.1 G/DL (ref 6.3–8.2)
RBC # BLD AUTO: 4.57 M/UL (ref 4.05–5.2)
SODIUM SERPL-SCNC: 136 MMOL/L (ref 133–143)
WBC # BLD AUTO: 7.5 K/UL (ref 4.3–11.1)

## 2022-12-20 PROCEDURE — 1090F PRES/ABSN URINE INCON ASSESS: CPT | Performed by: INTERNAL MEDICINE

## 2022-12-20 PROCEDURE — G8427 DOCREV CUR MEDS BY ELIG CLIN: HCPCS | Performed by: INTERNAL MEDICINE

## 2022-12-20 PROCEDURE — 99213 OFFICE O/P EST LOW 20 MIN: CPT | Performed by: INTERNAL MEDICINE

## 2022-12-20 PROCEDURE — 3074F SYST BP LT 130 MM HG: CPT | Performed by: INTERNAL MEDICINE

## 2022-12-20 PROCEDURE — 1036F TOBACCO NON-USER: CPT | Performed by: INTERNAL MEDICINE

## 2022-12-20 PROCEDURE — G8417 CALC BMI ABV UP PARAM F/U: HCPCS | Performed by: INTERNAL MEDICINE

## 2022-12-20 PROCEDURE — 3078F DIAST BP <80 MM HG: CPT | Performed by: INTERNAL MEDICINE

## 2022-12-20 PROCEDURE — G0439 PPPS, SUBSEQ VISIT: HCPCS | Performed by: INTERNAL MEDICINE

## 2022-12-20 PROCEDURE — 1123F ACP DISCUSS/DSCN MKR DOCD: CPT | Performed by: INTERNAL MEDICINE

## 2022-12-20 PROCEDURE — G8484 FLU IMMUNIZE NO ADMIN: HCPCS | Performed by: INTERNAL MEDICINE

## 2022-12-20 PROCEDURE — G8399 PT W/DXA RESULTS DOCUMENT: HCPCS | Performed by: INTERNAL MEDICINE

## 2022-12-20 RX ORDER — FLUTICASONE PROPIONATE 50 MCG
2 SPRAY, SUSPENSION (ML) NASAL DAILY
Qty: 16 G | Refills: 5 | Status: SHIPPED | OUTPATIENT
Start: 2022-12-20

## 2022-12-20 RX ORDER — ZOSTER VACCINE RECOMBINANT, ADJUVANTED 50 MCG/0.5
0.5 KIT INTRAMUSCULAR SEE ADMIN INSTRUCTIONS
Qty: 0.5 ML | Refills: 0 | Status: SHIPPED | OUTPATIENT
Start: 2022-12-20 | End: 2023-06-18

## 2022-12-20 ASSESSMENT — ENCOUNTER SYMPTOMS
DIARRHEA: 0
SHORTNESS OF BREATH: 0
VOMITING: 0
WHEEZING: 0
NAUSEA: 0
COUGH: 0
BLOOD IN STOOL: 0
CONSTIPATION: 0

## 2022-12-20 NOTE — PROGRESS NOTES
12/20/2022 2:22 PM  Location:[facility] [address] [facility]  [place]  Patient #:  [id number]  YOB: 1945            History of Present Illness     Chief Complaint   Patient presents with    Medicare AWV     Subsequent   Mammo's thru her oncologist    6 Month Follow-Up     6 month f/u    Ear Fullness     Complains with fullness in both ears. Ms. Luis Felipe Nagel is a 69 y.o. female  who presents for the above. Is not exercising regularly. Notes that her BP at home is better controlled. Usually in the 140s/80s. Ear Fullness   There is pain in the right ear. Pertinent negatives include no coughing, diarrhea or vomiting.         Allergies   Allergen Reactions    Penicillins Other (See Comments)     unknown     Past Medical History:   Diagnosis Date    Abdominal pain, right upper quadrant     Abnormal weight gain     Acute sinusitis, unspecified     Benign neoplasm of colon 2/25/2015    Benign paroxysmal positional vertigo 2/25/2015    Breast cancer ([facility] [address] 05/2017    left    Calculus of gallbladder without mention of cholecystitis or obstruction 2/25/2015    Depressive disorder, not elsewhere classified 2/25/2015    Dysuria     Essential hypertension, benign 2/25/2015    Insomnia, unspecified 2/25/2015    Nausea alone     Neoplasm of uncertain behavior of skin     Other malaise and fatigue     Primary localized osteoarthrosis, unspecified site 2/25/2015    Radiation therapy complication     Urinary tract infection, site not specified     Vaginitis and vulvovaginitis, unspecified 2/25/2015     Social History     Socioeconomic History    Marital status:      Spouse name: None    Number of children: None    Years of education: None    Highest education level: None   Tobacco Use    Smoking status: Never    Smokeless tobacco: Never   Substance and Sexual Activity    Alcohol use: No    Drug use: No     Social Determinants of Health     Financial Resource Strain: Low Risk Difficulty of Paying Living Expenses: Not hard at all   Food Insecurity: No Food Insecurity    Worried About [address] in the Last Year: Never true    Ran Out of Food in the Last Year: Never true   Physical Activity: Inactive    Days of Exercise per Week: 0 days    Minutes of Exercise per Session: 0 min     Past Surgical History:   Procedure Laterality Date    BREAST BIOPSY Left 04/03/2019    US Core Bx (minimal intraductal hyperplasia    BREAST BIOPSY Left 04/10/2017    US Core BX  (positive)    BREAST BIOPSY Left 05/05/2017    Needle Local    BREAST LUMPECTOMY Left 5/5/2017    LEFT BREAST NEEDLE LOCALIZED LUMPECTOMY performed by Lucio Bullard MD at [address] [facility] LUMPECTOMY Right     benign per pt    US BREAST NEEDLE BIOPSY LEFT Left 4/3/2019    US BREAST NEEDLE BIOPSY LEFT 4/3/2019 [facility]     Current Outpatient Medications   Medication Sig Dispense Refill    zoster recombinant adjuvanted vaccine (SHINGRIX) 50 MCG/0.5ML SUSR injection Inject 0.5 mLs into the muscle See Admin Instructions 1 dose now and repeat in 2-6 months 0.5 mL 0    fluticasone (FLONASE) 50 MCG/ACT nasal spray 2 sprays by Each Nostril route daily 16 g 5    LORazepam (ATIVAN) 1 MG tablet TAKE 1 TABLET BY MOUTH TWICE DAILY AS NEEDED FOR ANXIETY      QUEtiapine (SEROQUEL) 50 MG tablet TAKE 3 TABLETS BY MOUTH AT BEDTIME      temazepam (RESTORIL) 30 MG capsule Take by mouth. venlafaxine (EFFEXOR XR) 150 MG extended release capsule TAKE 1 CAPSULE BY MOUTH EVERY DAY WITH FOOD       No current facility-administered medications for this visit.      Health Maintenance   Topic Date Due    COVID-19 Vaccine (1) 1945    Shingles vaccine (1 of 2) Never done    Flu vaccine (1) 08/01/2022    Depression Monitoring  12/19/2023    Annual Wellness Visit (AWV)  12/21/2023    DTaP/Tdap/Td vaccine (2 - Td or Tdap) 12/07/2025    DEXA (modify frequency per FRAX score)  Completed    Pneumococcal 65+ years Vaccine  Completed Hepatitis A vaccine  Aged Out    Hib vaccine  Aged Out    Meningococcal (ACWY) vaccine  Aged Out    Hepatitis C screen  Discontinued     Family History   Problem Relation Age of Onset    Cancer Maternal Aunt         breast, great aunt    Cancer Maternal Grandmother         great grandmother, breast    Breast Cancer Maternal Grandmother              Review of Systems  Review of Systems   Constitutional:  Negative for chills and fever. HENT:  Positive for ear pain (right ear since she got water in it a month ago). Respiratory:  Negative for cough, shortness of breath and wheezing. Cardiovascular:  Negative for chest pain, palpitations and leg swelling. Gastrointestinal:  Negative for blood in stool, constipation, diarrhea, nausea and vomiting. Genitourinary:  Negative for difficulty urinating, dysuria and hematuria. Musculoskeletal:  Positive for arthralgias. Neurological:  Positive for dizziness. Negative for weakness and numbness. Psychiatric/Behavioral:  Positive for dysphoric mood and sleep disturbance. The patient is not nervous/anxious. BP (!) 146/94   Pulse 96   Resp 18   Ht 5' 3\" (1.6 m)   Wt 146 lb (66.2 kg)   BMI 25.86 kg/m²       Physical Exam    Physical Exam  Constitutional:       Appearance: Normal appearance. She is not ill-appearing. HENT:      Head: Normocephalic. Right Ear: Tympanic membrane is retracted. Neck:      Vascular: No carotid bruit. Cardiovascular:      Rate and Rhythm: Normal rate and regular rhythm. Pulmonary:      Effort: Pulmonary effort is normal.      Breath sounds: Normal breath sounds. No wheezing. Abdominal:      General: Abdomen is flat. Palpations: Abdomen is soft. Tenderness: There is no abdominal tenderness. Musculoskeletal:      Right lower leg: No edema. Left lower leg: No edema. Neurological:      Mental Status: She is alert and oriented to person, place, and time.       Deep Tendon Reflexes:      Reflex Scores:       Patellar reflexes are 2+ on the right side and 2+ on the left side. Psychiatric:         Mood and Affect: Mood normal.         Behavior: Behavior normal.         Assessment & Plan    Current Outpatient Medications   Medication Sig Dispense Refill    zoster recombinant adjuvanted vaccine (SHINGRIX) 50 MCG/0.5ML SUSR injection Inject 0.5 mLs into the muscle See Admin Instructions 1 dose now and repeat in 2-6 months 0.5 mL 0    fluticasone (FLONASE) 50 MCG/ACT nasal spray 2 sprays by Each Nostril route daily 16 g 5    LORazepam (ATIVAN) 1 MG tablet TAKE 1 TABLET BY MOUTH TWICE DAILY AS NEEDED FOR ANXIETY      QUEtiapine (SEROQUEL) 50 MG tablet TAKE 3 TABLETS BY MOUTH AT BEDTIME      temazepam (RESTORIL) 30 MG capsule Take by mouth. venlafaxine (EFFEXOR XR) 150 MG extended release capsule TAKE 1 CAPSULE BY MOUTH EVERY DAY WITH FOOD       No current facility-administered medications for this visit. Orders Placed This Encounter   Procedures    TSH with Reflex     Standing Status:   Future     Number of Occurrences:   1     Standing Expiration Date:   2023    Comprehensive Metabolic Panel     Standing Status:   Future     Number of Occurrences:   1     Standing Expiration Date:   2023    CBC with Auto Differential     Standing Status:   Future     Number of Occurrences:   1     Standing Expiration Date:   2023         Orders Placed This Encounter   Medications    zoster recombinant adjuvanted vaccine (SHINGRIX) 50 MCG/0.5ML SUSR injection     Sig: Inject 0.5 mLs into the muscle See Admin Instructions 1 dose now and repeat in 2-6 months     Dispense:  0.5 mL     Refill:  0    fluticasone (FLONASE) 50 MCG/ACT nasal spray     Si sprays by Each Nostril route daily     Dispense:  16 g     Refill:  5       Medications Discontinued During This Encounter   Medication Reason    anastrozole (ARIMIDEX) 1 MG tablet LIST CLEANUP        Diagnosis Orders   1.  Essential hypertension, benign TSH with Reflex    Comprehensive Metabolic Panel    Comprehensive Metabolic Panel    TSH with Reflex      2. Medicare annual wellness visit, subsequent        3. Primary localized osteoarthrosis        4. Recurrent depression ([address]        5. Personal history of breast cancer  CBC with Auto Differential    CBC with Auto Differential      6. Primary insomnia        7. Panic disorder        8. Acute dysfunction of right eustachian tube           Wellness information reviewed and appropriate screening addressed. Advised patient to secure living will and healthcare POA. Try the above for eustachian tube dysfunction. Knows to keep a low threshold for contacting the office with worsening symptoms. No doubt a component of 'white coat hypertension' with higher in-office readings than home readings. Document BP several times on two days weekly. Contact office if not <=130/80 consistently. Recommended 30 minutes of aerobic exercise at least 4-5 days weekly for physical as well as emotional reasons. Follow up as documented or earlier as needed. Return for Medicare Annual Wellness Visit in 1 year. Jose Antonio Ko MD  Medicare Annual Wellness Visit    Daniela Brady is here for Medicare AWV (Subsequent /Mammo's thru her oncologist), 6 Month Follow-Up (6 month f/u), and Ear Fullness (Complains with fullness in both ears. )    Assessment & Plan   Essential hypertension, benign  -     TSH with Reflex; Future  -     Comprehensive Metabolic Panel;  Future  Medicare annual wellness visit, subsequent  Primary localized osteoarthrosis  Recurrent depression ([address]  Personal history of breast cancer  -     CBC with Auto Differential; Future  Primary insomnia  Panic disorder  Acute dysfunction of right eustachian tube      Recommendations for Preventive Services Due: see orders and patient instructions/AVS.  Recommended screening schedule for the next 5-10 years is provided to the patient in written form: see Patient Instructions/AVS.     Return for Medicare Annual Wellness Visit in 1 year. Subjective       Patient's complete Health Risk Assessment and screening values have been reviewed and are found in Flowsheets. The following problems were reviewed today and where indicated follow up appointments were made and/or referrals ordered. Positive Risk Factor Screenings with Interventions:               General HRA Questions:  Select all that apply: (!) Stress    Stress Interventions:  See above  See AVS for additional education material  See A/P for plan and any pertinent orders       Weight and Activity:  Physical Activity: Inactive    Days of Exercise per Week: 0 days    Minutes of Exercise per Session: 0 min     On average, how many days per week do you engage in moderate to strenuous exercise (like a brisk walk)?: 0 days  Have you lost any weight without trying in the past 3 months?: (!) Yes  Body mass index: (!) 25.86      Inactivity Interventions:  See above  See AVS for additional education material  See A/P for plan and any pertinent orders    Unintentional Weight Loss Interventions:  Evaluating for over active thyroid  See AVS for additional education material  See A/P for plan and any pertinent orders      Dentist Screen:  Have you seen the dentist within the past year?: (!) No    Intervention:  Advised her to go to her dentist  See AVS for additional education material  See A/P for any pertinent orders        Advanced Directives:  Do you have a Living Will?: (!) No    Intervention:                         Objective   Vitals:    12/20/22 1349 12/20/22 1353 12/20/22 1354   BP: (!) 169/107 (!) 164/99 (!) 146/94   Site: Left Upper Arm     Position: Sitting     Cuff Size: Large Adult     Pulse: 96     Resp: 18     Weight: 146 lb (66.2 kg)     Height: 5' 3\" (1.6 m)        Body mass index is 25.86 kg/m².                Allergies   Allergen Reactions    Penicillins Other (See Comments)     unknown     Prior to Visit Medications    Medication Sig Taking? Authorizing Provider   zoster recombinant adjuvanted vaccine [facility]) 50 MCG/0.5ML SUSR injection Inject 0.5 mLs into the muscle See Admin Instructions 1 dose now and repeat in 2-6 months Yes Orestes Almanza MD   fluticasone [facility]) 50 MCG/ACT nasal spray 2 sprays by Each Nostril route daily Yes Orestes Almanza MD   LORazepam (ATIVAN) 1 MG tablet TAKE 1 TABLET BY MOUTH TWICE DAILY AS NEEDED FOR ANXIETY Yes Ar Automatic Reconciliation   QUEtiapine (SEROQUEL) 50 MG tablet TAKE 3 TABLETS BY MOUTH AT BEDTIME Yes Ar Automatic Reconciliation   temazepam (RESTORIL) 30 MG capsule Take by mouth.  Yes Ar Automatic Reconciliation   venlafaxine (EFFEXOR XR) 150 MG extended release capsule TAKE 1 CAPSULE BY MOUTH EVERY DAY WITH FOOD Yes Ar Automatic Reconciliation       CareTeam (Including outside providers/suppliers regularly involved in providing care):   Patient Care Team:  Orestes Almanza MD as PCP - Katt Gao MD as PCP - [facility] Empaneled Provider     Reviewed and updated this visit:  Tobacco  Allergies  Meds  Problems  Med Hx  Surg Hx  Soc Hx  Fam Hx

## 2022-12-20 NOTE — PATIENT INSTRUCTIONS
Learning About Stress  What is stress? Stress is what you feel when you have to handle more than you are used to. Stress is a fact of life for most people, and it affects everyone differently. What causes stress for you may not be stressful for someone else. A lot of things can cause stress. You may feel stress when you go on a job interview, take a test, or run a race. This kind of short-term stress is normal and even useful. It can help you if you need to work hard or react quickly. For example, stress can help you finish an important job on time. Stress also can last a long time. Long-term stress is caused by stressful situations or events. Examples of long-term stress include long-term health problems, ongoing problems at work, or conflicts in your family. Long-term stress can harm your health. How does stress affect your health? When you are stressed, your body responds as though you are in danger. It makes hormones that speed up your heart, make you breathe faster, and give you a burst of energy. This is called the fight-or-flight stress response. If the stress is over quickly, your body goes back to normal and no harm is done. But if stress happens too often or lasts too long, it can have bad effects. Long-term stress can make you more likely to get sick, and it can make symptoms of some diseases worse. If you tense up when you are stressed, you may develop neck, shoulder, or low back pain. Stress is linked to high blood pressure and heart disease. Stress also harms your emotional health. It can make you banks, tense, or depressed. Your relationships may suffer, and you may not do well at work or school. What can you do to manage stress? How to relax your mind   Write. It may help to write about things that are bothering you. This helps you find out how much stress you feel and what is causing it. When you know this, you can find better ways to cope. Let your feelings out.  Talk, laugh, cry, and express anger when you need to. Talking with friends, family, a counselor, or a member of the clergy about your feelings is a healthy way to relieve stress. Do something you enjoy. For example, listen to music or go to a movie. Practice your hobby or do volunteer work. Meditate. This can help you relax, because you are not worrying about what happened before or what may happen in the future. Do guided imagery. Imagine yourself in any setting that helps you feel calm. You can use audiotapes, books, or a teacher to guide you. How to relax your body   Do something active. Exercise or activity can help reduce stress. Walking is a great way to get started. Even everyday activities such as housecleaning or yard work can help. Do breathing exercises. For example:  From a standing position, bend forward from the waist with your knees slightly bent. Let your arms dangle close to the floor. Breathe in slowly and deeply as you return to a standing position. Roll up slowly and lift your head last.  Hold your breath for just a few seconds in the standing position. Breathe out slowly and bend forward from the waist.  Try yoga or batsheva chi. These techniques combine exercise and meditation. You may need some training at first to learn them. What can you do to prevent stress? Manage your time. This helps you find time to do the things you want and need to do. Get enough sleep. Your body recovers from the stresses of the day while you are sleeping. Get support. Your family, friends, and community can make a difference in how you experience stress. Where can you learn more? Go to http://www.juárez.com/ and enter N032 to learn more about \"Learning About Stress. \"  Current as of: October 6, 2021               Content Version: 13.5  © 6219-9619 Healthwise, [employer]. Care instructions adapted under license by [facility] ([facility]).  If you have questions about a medical condition or this instruction, always ask your healthcare professional. [address] any warranty or liability for your use of this information. Learning About Being Active as an Older Adult  Why is being active important as you get older? Being active is one of the best things you can do for your health. And it's never too late to start. Being active--or getting active, if you aren't already--has definite benefits. It can:  Give you more energy,  Keep your mind sharp. Improve balance to reduce your risk of falls. Help you manage chronic illness with fewer medicines. No matter how old you are, how fit you are, or what health problems you have, there is a form of activity that will work for you. And the more physical activity you can do, the better your overall health will be. What kinds of activity can help you stay healthy? Being more active will make your daily activities easier. Physical activity includes planned exercise and things you do in daily life. There are four types of activity:  Aerobic. Doing aerobic activity makes your heart and lungs strong. Includes walking, dancing, and gardening. Aim for at least 2½ hours spread throughout the week. It improves your energy and can help you sleep better. Muscle-strengthening. This type of activity can help maintain muscle and strengthen bones. Includes climbing stairs, using resistance bands, and lifting or carrying heavy loads. Aim for at least twice a week. It can help protect the knees and other joints. Stretching. Stretching gives you better range of motion in joints and muscles. Includes upper arm stretches, calf stretches, and gentle yoga. Aim for at least twice a week, preferably after your muscles are warmed up from other activities. It can help you function better in daily life. Balancing. This helps you stay coordinated and have good posture. Includes heel-to-toe walking, batsheva chi, and certain types of yoga.   Aim for at least 3 days a week.  It can reduce your risk of falling. Even if you have a hard time meeting the recommendations, it's better to be more active than less active. All activity done in each category counts toward your weekly total. You'd be surprised how daily things like carrying groceries, keeping up with grandchildren, and taking the stairs can add up. What keeps you from being active? If you've had a hard time being more active, you're not alone. Maybe you remember being able to do more. Or maybe you've never thought of yourself as being active. It's frustrating when you can't do the things you want. Being more active can help. What's holding you back? Getting started. Have a goal, but break it into easy tasks. Small steps build into big accomplishments. Staying motivated. If you feel like skipping your activity, remember your goal. Maybe you want to move better and stay independent. Every activity gets you one step closer. Not feeling your best.  Start with 5 minutes of an activity you enjoy. Prove to yourself you can do it. As you get comfortable, increase your time. You may not be where you want to be. But you're in the process of getting there. Everyone starts somewhere. How can you find safe ways to stay active? Talk with your doctor about any physical challenges you're facing. Make a plan with your doctor if you have a health problem or aren't sure how to get started with activity. If you're already active, ask your doctor if there is anything you should change to stay safe as your body and health change. If you tend to feel dizzy after you take medicine, avoid activity at that time. Try being active before you take your medicine. This will reduce your risk of falls. If you plan to be active at home, make sure to clear your space before you get started. Remove things like TV cords, coffee tables, and throw rugs. It's safest to have plenty of space to move freely.   The key to getting more active is to take it slow and steady. Try to improve only a little bit at a time. Pick just one area to improve on at first. And if an activity hurts, stop and talk to your doctor. Where can you learn more? Go to http://www.juárez.com/ and enter P600 to learn more about \"Learning About Being Active as an Older Adult. \"  Current as of: October 10, 2022               Content Version: 13.5  © 2006-2022 [employer]. Care instructions adapted under license by [facility] ([facility]). If you have questions about a medical condition or this instruction, always ask your healthcare professional. [place] [address] any warranty or liability for your use of this information. Learning About Dental Care for Older Adults  Dental care for older adults: Overview  Dental care for older people is much the same as for younger adults. But older adults do have concerns that younger adults do not. Older adults may have problems with gum disease and decay on the roots of their teeth. They may need missing teeth replaced or broken fillings fixed. Or they may have dentures that need to be cared for. Some older adults may have trouble holding a toothbrush. You can help remind the person you are caring for to brush and floss their teeth or to clean their dentures. In some cases, you may need to do the brushing and other dental care tasks. People who have trouble using their hands or who have dementia may need this extra help. How can you help with dental care? Normal dental care  To keep the teeth and gums healthy:  Brush the teeth with fluoride toothpaste twice a day--in the morning and at night--and floss at least once a day. Plaque can quickly build up on the teeth of older adults. Watch for the signs of gum disease. These signs include gums that bleed after brushing or after eating hard foods, such as apples. See a dentist regularly. Many experts recommend checkups every 6 months.   Keep the dentist up to date on any new medications the person is taking. Encourage a balanced diet that includes whole grains, vegetables, and fruits, and that is low in saturated fat and sodium. Encourage the person you're caring for not to use tobacco products. They can affect dental and general health. Many older adults have a fixed income and feel that they can't afford dental care. But most towns and cities have programs in which dentists help older adults by lowering fees. Contact your area's public health offices or  for information about dental care in your area. Using a toothbrush  Older adults with arthritis sometimes have trouble brushing their teeth because they can't easily hold the toothbrush. Their hands and fingers may be stiff, painful, or weak. If this is the case, you can: Offer an electric toothbrush. Enlarge the handle of a non-electric toothbrush by wrapping a sponge, an elastic bandage, or adhesive tape around it. Push the toothbrush handle through a ball made of rubber or soft foam.  Make the handle longer and thicker by taping Popsicle sticks or tongue depressors to it. You may also be able to buy special toothbrushes, toothpaste dispensers, and floss holders. Your doctor may recommend a soft-bristle toothbrush if the person you care for bleeds easily. Bleeding can happen because of a health problem or from certain medicines. A toothpaste for sensitive teeth may help if the person you care for has sensitive teeth. How do you brush and floss someone's teeth? If the person you are caring for has a hard time cleaning their teeth on their own, you may need to brush and floss their teeth for them. It may be easiest to have the person sit and face away from you, and to sit or stand behind them. That way you can steady their head against your arm as you reach around to floss and brush their teeth. Choose a place that has good lighting and is comfortable for both of you.   Before you begin, gather your supplies. You will need gloves, floss, a toothbrush, and a container to hold water if you are not near a sink. Wash and dry your hands well and put on gloves. Start by flossing:  Gently work a piece of floss between each of the teeth toward the gums. A plastic flossing tool may make this easier, and they are available at most [facility]. Curve the floss around each tooth into a U-shape and gently slide it under the gum line. Move the floss firmly up and down several times to scrape off the plaque. After you've finished flossing, throw away the used floss and begin brushing:  Wet the brush and apply toothpaste. Place the brush at a 45-degree angle where the teeth meet the gums. Press firmly, and move the brush in small circles over the surface of the teeth. Be careful not to brush too hard. Vigorous brushing can make the gums pull away from the teeth and can scratch the tooth enamel. Brush all surfaces of the teeth, on the tongue side and on the cheek side. Pay special attention to the front teeth and all surfaces of the back teeth. Brush chewing surfaces with short back-and-forth strokes. After you've finished, help the person rinse the remaining toothpaste from their mouth. Where can you learn more? Go to http://www.woods.com/ and enter F944 to learn more about \"Learning About Dental Care for Older Adults. \"  Current as of: June 16, 2022               Content Version: 13.5  © 2006-2022 Healthwise, [employer]. Care instructions adapted under license by [facility] ([facility]). If you have questions about a medical condition or this instruction, always ask your healthcare professional. [place] [address] any warranty or liability for your use of this information. Advance Directives: Care Instructions  Overview  An advance directive is a legal way to state your wishes at the end of your life.  It tells your family and your doctor what to do if you can't say what you want. There are two main types of advance directives. You can change them any time your wishes change. Living will. This form tells your family and your doctor your wishes about life support and other treatment. The form is also called a declaration. Medical power of . This form lets you name a person to make treatment decisions for you when you can't speak for yourself. This person is called a health care agent (health care proxy, health care surrogate). The form is also called a durable power of  for health care. If you do not have an advance directive, decisions about your medical care may be made by a family member, or by a doctor or a  who doesn't know you. It may help to think of an advance directive as a gift to the people who care for you. If you have one, they won't have to make tough decisions by themselves. For more information, including forms for your state, see the [facility] website (www.caringinfo.org/planning/advance-directives/). Follow-up care is a key part of your treatment and safety. Be sure to make and go to all appointments, and call your doctor if you are having problems. It's also a good idea to know your test results and keep a list of the medicines you take. What should you include in an advance directive? Many states have a unique advance directive form. (It may ask you to address specific issues.) Or you might use a universal form that's approved by many states. If your form doesn't tell you what to address, it may be hard to know what to include in your advance directive. Use the questions below to help you get started. Who do you want to make decisions about your medical care if you are not able to? What life-support measures do you want if you have a serious illness that gets worse over time or can't be cured? What are you most afraid of that might happen?  (Maybe you're afraid of having pain, losing your independence, or being kept alive by machines.)  Where would you prefer to die? (Your home? A hospital? A nursing home?)  Do you want to donate your organs when you die? Do you want certain Shinto practices performed before you die? When should you call for help? Be sure to contact your doctor if you have any questions. Where can you learn more? Go to http://www.juárez.com/ and enter R264 to learn more about \"Advance Directives: Care Instructions. \"  Current as of: June 16, 2022               Content Version: 13.5  © 3166-7766 Healthwise, Incorporated. Care instructions adapted under license by [facility] ([facility]). If you have questions about a medical condition or this instruction, always ask your healthcare professional. [address] any warranty or liability for your use of this information. Personalized Preventive Plan for Jennifer Sims - 12/20/2022  Medicare offers a range of preventive health benefits. Some of the tests and screenings are paid in full while other may be subject to a deductible, co-insurance, and/or copay. Some of these benefits include a comprehensive review of your medical history including lifestyle, illnesses that may run in your family, and various assessments and screenings as appropriate. After reviewing your medical record and screening and assessments performed today your provider may have ordered immunizations, labs, imaging, and/or referrals for you. A list of these orders (if applicable) as well as your Preventive Care list are included within your After Visit Summary for your review. Other Preventive Recommendations:    A preventive eye exam performed by an eye specialist is recommended every 1-2 years to screen for glaucoma; cataracts, macular degeneration, and other eye disorders. A preventive dental visit is recommended every 6 months. Try to get at least 150 minutes of exercise per week or 10,000 steps per day on a pedometer .   Order or download the FREE \"Exercise & Physical Activity: Your Everyday Guide\" from [employer]. Call 3-377.649.4210 or search [employer] online. You need 0804-8657 mg of calcium and 1322-5404 IU of vitamin D per day. It is possible to meet your calcium requirement with diet alone, but a vitamin D supplement is usually necessary to meet this goal.  When exposed to the sun, use a sunscreen that protects against both UVA and UVB radiation with an SPF of 30 or greater. Reapply every 2 to 3 hours or after sweating, drying off with a towel, or swimming. Always wear a seat belt when traveling in a car. Always wear a helmet when riding a bicycle or motorcycle.

## 2022-12-21 LAB — TSH W FREE THYROID IF ABNORMAL: 2.33 UIU/ML (ref 0.36–3.74)

## 2022-12-21 NOTE — RESULT ENCOUNTER NOTE
Labs are stable. Continue your current doses of medications. Keep up the good work. Have a Junious Havers Ozzie! Thanks.   Tiffani Sarkar

## 2023-07-13 ENCOUNTER — HOSPITAL ENCOUNTER (OUTPATIENT)
Dept: MAMMOGRAPHY | Age: 78
Discharge: HOME | End: 2023-07-13
Attending: INTERNAL MEDICINE
Payer: MEDICARE

## 2023-07-13 DIAGNOSIS — Z12.31 SCREENING MAMMOGRAM FOR HIGH-RISK PATIENT: ICD-10-CM

## 2023-07-13 PROCEDURE — 77063 BREAST TOMOSYNTHESIS BI: CPT

## 2023-10-22 ENCOUNTER — APPOINTMENT (OUTPATIENT)
Dept: CT IMAGING | Age: 78
DRG: 917 | End: 2023-10-22
Payer: MEDICARE

## 2023-10-22 ENCOUNTER — HOSPITAL ENCOUNTER (INPATIENT)
Age: 78
LOS: 8 days | Discharge: NURSING, REHAB OR LONG-TERM CARE | DRG: 917 | End: 2023-10-30
Attending: EMERGENCY MEDICINE | Admitting: INTERNAL MEDICINE
Payer: MEDICARE

## 2023-10-22 ENCOUNTER — APPOINTMENT (OUTPATIENT)
Dept: GENERAL RADIOLOGY | Age: 78
DRG: 917 | End: 2023-10-22
Payer: MEDICARE

## 2023-10-22 DIAGNOSIS — T50.902A INTENTIONAL DRUG OVERDOSE, INITIAL ENCOUNTER (HCC): Primary | ICD-10-CM

## 2023-10-22 DIAGNOSIS — R40.2432 GLASGOW COMA SCALE TOTAL SCORE 3-8, AT ARRIVAL TO EMERGENCY DEPARTMENT (HCC): ICD-10-CM

## 2023-10-22 DIAGNOSIS — T42.4X2A INTENTIONAL BENZODIAZEPINE OVERDOSE, INITIAL ENCOUNTER (HCC): ICD-10-CM

## 2023-10-22 DIAGNOSIS — T43.202A ANTIDEPRESSANT OVERDOSE, INTENTIONAL SELF-HARM, INITIAL ENCOUNTER (HCC): ICD-10-CM

## 2023-10-22 PROBLEM — F33.9 RECURRENT DEPRESSION (HCC): Status: ACTIVE | Noted: 2018-01-05

## 2023-10-22 PROBLEM — N17.9 AKI (ACUTE KIDNEY INJURY) (HCC): Status: ACTIVE | Noted: 2023-10-22

## 2023-10-22 PROBLEM — T50.901A: Status: ACTIVE | Noted: 2023-10-22

## 2023-10-22 PROBLEM — J96.01 ACUTE HYPOXEMIC RESPIRATORY FAILURE (HCC): Status: ACTIVE | Noted: 2023-10-22

## 2023-10-22 LAB
ALBUMIN SERPL-MCNC: 3.6 G/DL (ref 3.2–4.6)
ALBUMIN/GLOB SERPL: 1.2 (ref 0.4–1.6)
ALP SERPL-CCNC: 72 U/L (ref 50–136)
ALT SERPL-CCNC: 18 U/L (ref 12–65)
AMPHET UR QL SCN: NEGATIVE
ANION GAP SERPL CALC-SCNC: 7 MMOL/L (ref 2–11)
ARTERIAL PATENCY WRIST A: POSITIVE
AST SERPL-CCNC: 21 U/L (ref 15–37)
BASE DEFICIT BLD-SCNC: 2 MMOL/L
BASOPHILS # BLD: 0 K/UL (ref 0–0.2)
BASOPHILS NFR BLD: 0 % (ref 0–2)
BDY SITE: ABNORMAL
BENZODIAZ UR QL: POSITIVE
BILIRUB SERPL-MCNC: 0.5 MG/DL (ref 0.2–1.1)
BUN SERPL-MCNC: 21 MG/DL (ref 8–23)
CA-I BLD-MCNC: 1.19 MMOL/L (ref 1.12–1.32)
CALCIUM SERPL-MCNC: 9.3 MG/DL (ref 8.3–10.4)
CANNABINOIDS UR QL SCN: NEGATIVE
CHLORIDE SERPL-SCNC: 105 MMOL/L (ref 101–110)
CK SERPL-CCNC: 392 U/L (ref 21–215)
CO2 BLD-SCNC: 24 MMOL/L (ref 13–23)
CO2 SERPL-SCNC: 26 MMOL/L (ref 21–32)
COCAINE UR QL SCN: NEGATIVE
CREAT SERPL-MCNC: 1.4 MG/DL (ref 0.6–1)
DIFFERENTIAL METHOD BLD: ABNORMAL
EKG ATRIAL RATE: 124 BPM
EKG DIAGNOSIS: NORMAL
EKG P AXIS: 45 DEGREES
EKG P-R INTERVAL: 146 MS
EKG Q-T INTERVAL: 318 MS
EKG QRS DURATION: 106 MS
EKG QTC CALCULATION (BAZETT): 461 MS
EKG R AXIS: -27 DEGREES
EKG T AXIS: -2 DEGREES
EKG VENTRICULAR RATE: 126 BPM
EOSINOPHIL # BLD: 0 K/UL (ref 0–0.8)
EOSINOPHIL NFR BLD: 0 % (ref 0.5–7.8)
ERYTHROCYTE [DISTWIDTH] IN BLOOD BY AUTOMATED COUNT: 13.3 % (ref 11.9–14.6)
ETHANOL SERPL-MCNC: <3 MG/DL (ref 0–0.08)
FIO2 ON VENT: 60 %
GAS FLOW.O2 O2 DELIVERY SYS: ABNORMAL
GLOBULIN SER CALC-MCNC: 2.9 G/DL (ref 2.8–4.5)
GLUCOSE BLD STRIP.AUTO-MCNC: 147 MG/DL (ref 65–100)
GLUCOSE SERPL-MCNC: 139 MG/DL (ref 65–100)
HCO3 BLD-SCNC: 23.9 MMOL/L (ref 22–26)
HCT VFR BLD AUTO: 43.4 % (ref 35.8–46.3)
HGB BLD-MCNC: 14.2 G/DL (ref 11.7–15.4)
IMM GRANULOCYTES # BLD AUTO: 0 K/UL (ref 0–0.5)
IMM GRANULOCYTES NFR BLD AUTO: 0 % (ref 0–5)
LACTATE SERPL-SCNC: 1.4 MMOL/L (ref 0.4–2)
LYMPHOCYTES # BLD: 2.8 K/UL (ref 0.5–4.6)
LYMPHOCYTES NFR BLD: 42 % (ref 13–44)
MAGNESIUM SERPL-MCNC: 2.3 MG/DL (ref 1.8–2.4)
MAGNESIUM SERPL-MCNC: 2.5 MG/DL (ref 1.8–2.4)
MCH RBC QN AUTO: 30.7 PG (ref 26.1–32.9)
MCHC RBC AUTO-ENTMCNC: 32.7 G/DL (ref 31.4–35)
MCV RBC AUTO: 93.9 FL (ref 82–102)
MONOCYTES # BLD: 0.5 K/UL (ref 0.1–1.3)
MONOCYTES NFR BLD: 7 % (ref 4–12)
NEUTS SEG # BLD: 3.3 K/UL (ref 1.7–8.2)
NEUTS SEG NFR BLD: 51 % (ref 43–78)
NRBC # BLD: 0 K/UL (ref 0–0.2)
OPIATES UR QL: NEGATIVE
PCO2 BLD: 44.4 MMHG (ref 35–45)
PEEP RESPIRATORY: 8
PH BLD: 7.34 (ref 7.35–7.45)
PLATELET # BLD AUTO: 191 K/UL (ref 150–450)
PMV BLD AUTO: 10.8 FL (ref 9.4–12.3)
PO2 BLD: 340 MMHG (ref 75–100)
POTASSIUM BLD-SCNC: 3.9 MMOL/L (ref 3.5–5.1)
POTASSIUM SERPL-SCNC: 4.9 MMOL/L (ref 3.5–5.1)
PROT SERPL-MCNC: 6.5 G/DL (ref 6.3–8.2)
RBC # BLD AUTO: 4.62 M/UL (ref 4.05–5.2)
RESPIRATORY RATE, POC: 16 (ref 5–40)
SAO2 % BLD: 100 %
SERVICE CMNT-IMP: ABNORMAL
SERVICE CMNT-IMP: ABNORMAL
SODIUM BLD-SCNC: 138 MMOL/L (ref 136–145)
SODIUM SERPL-SCNC: 138 MMOL/L (ref 133–143)
SPECIMEN SITE: ABNORMAL
VENTILATION MODE VENT: ABNORMAL
VT SETTING VENT: 365
WBC # BLD AUTO: 6.6 K/UL (ref 4.3–11.1)

## 2023-10-22 PROCEDURE — 31500 INSERT EMERGENCY AIRWAY: CPT

## 2023-10-22 PROCEDURE — 99291 CRITICAL CARE FIRST HOUR: CPT | Performed by: INTERNAL MEDICINE

## 2023-10-22 PROCEDURE — 51702 INSERT TEMP BLADDER CATH: CPT

## 2023-10-22 PROCEDURE — 84132 ASSAY OF SERUM POTASSIUM: CPT

## 2023-10-22 PROCEDURE — 80053 COMPREHEN METABOLIC PANEL: CPT

## 2023-10-22 PROCEDURE — 70450 CT HEAD/BRAIN W/O DYE: CPT

## 2023-10-22 PROCEDURE — 96374 THER/PROPH/DIAG INJ IV PUSH: CPT

## 2023-10-22 PROCEDURE — 96376 TX/PRO/DX INJ SAME DRUG ADON: CPT

## 2023-10-22 PROCEDURE — 82550 ASSAY OF CK (CPK): CPT

## 2023-10-22 PROCEDURE — 99291 CRITICAL CARE FIRST HOUR: CPT

## 2023-10-22 PROCEDURE — 2500000003 HC RX 250 WO HCPCS: Performed by: EMERGENCY MEDICINE

## 2023-10-22 PROCEDURE — 84295 ASSAY OF SERUM SODIUM: CPT

## 2023-10-22 PROCEDURE — 2000000000 HC ICU R&B

## 2023-10-22 PROCEDURE — 0BH17EZ INSERTION OF ENDOTRACHEAL AIRWAY INTO TRACHEA, VIA NATURAL OR ARTIFICIAL OPENING: ICD-10-PCS | Performed by: INTERNAL MEDICINE

## 2023-10-22 PROCEDURE — 71045 X-RAY EXAM CHEST 1 VIEW: CPT

## 2023-10-22 PROCEDURE — 74018 RADEX ABDOMEN 1 VIEW: CPT

## 2023-10-22 PROCEDURE — 83735 ASSAY OF MAGNESIUM: CPT

## 2023-10-22 PROCEDURE — A4216 STERILE WATER/SALINE, 10 ML: HCPCS | Performed by: INTERNAL MEDICINE

## 2023-10-22 PROCEDURE — 82947 ASSAY GLUCOSE BLOOD QUANT: CPT

## 2023-10-22 PROCEDURE — 36415 COLL VENOUS BLD VENIPUNCTURE: CPT

## 2023-10-22 PROCEDURE — 93010 ELECTROCARDIOGRAM REPORT: CPT | Performed by: INTERNAL MEDICINE

## 2023-10-22 PROCEDURE — 94002 VENT MGMT INPAT INIT DAY: CPT

## 2023-10-22 PROCEDURE — 2580000003 HC RX 258: Performed by: EMERGENCY MEDICINE

## 2023-10-22 PROCEDURE — 85025 COMPLETE CBC W/AUTO DIFF WBC: CPT

## 2023-10-22 PROCEDURE — 96361 HYDRATE IV INFUSION ADD-ON: CPT

## 2023-10-22 PROCEDURE — 82803 BLOOD GASES ANY COMBINATION: CPT

## 2023-10-22 PROCEDURE — 83605 ASSAY OF LACTIC ACID: CPT

## 2023-10-22 PROCEDURE — 82330 ASSAY OF CALCIUM: CPT

## 2023-10-22 PROCEDURE — 93005 ELECTROCARDIOGRAM TRACING: CPT

## 2023-10-22 PROCEDURE — 93005 ELECTROCARDIOGRAM TRACING: CPT | Performed by: EMERGENCY MEDICINE

## 2023-10-22 PROCEDURE — 5A1945Z RESPIRATORY VENTILATION, 24-96 CONSECUTIVE HOURS: ICD-10-PCS | Performed by: INTERNAL MEDICINE

## 2023-10-22 PROCEDURE — 6360000002 HC RX W HCPCS: Performed by: INTERNAL MEDICINE

## 2023-10-22 PROCEDURE — 2580000003 HC RX 258: Performed by: INTERNAL MEDICINE

## 2023-10-22 PROCEDURE — 80307 DRUG TEST PRSMV CHEM ANLYZR: CPT

## 2023-10-22 PROCEDURE — 2500000003 HC RX 250 WO HCPCS: Performed by: INTERNAL MEDICINE

## 2023-10-22 PROCEDURE — 82077 ASSAY SPEC XCP UR&BREATH IA: CPT

## 2023-10-22 RX ORDER — ONDANSETRON 4 MG/1
4 TABLET, ORALLY DISINTEGRATING ORAL EVERY 8 HOURS PRN
Status: DISCONTINUED | OUTPATIENT
Start: 2023-10-22 | End: 2023-10-30 | Stop reason: HOSPADM

## 2023-10-22 RX ORDER — SODIUM CHLORIDE 9 MG/ML
INJECTION, SOLUTION INTRAVENOUS ONCE
Status: COMPLETED | OUTPATIENT
Start: 2023-10-22 | End: 2023-10-22

## 2023-10-22 RX ORDER — FENTANYL CITRATE-0.9 % NACL/PF 10 MCG/ML
25-200 PLASTIC BAG, INJECTION (ML) INTRAVENOUS CONTINUOUS
Status: DISCONTINUED | OUTPATIENT
Start: 2023-10-22 | End: 2023-10-24

## 2023-10-22 RX ORDER — POLYETHYLENE GLYCOL 3350 17 G/17G
17 POWDER, FOR SOLUTION ORAL DAILY PRN
Status: DISCONTINUED | OUTPATIENT
Start: 2023-10-22 | End: 2023-10-30 | Stop reason: HOSPADM

## 2023-10-22 RX ORDER — DEXMEDETOMIDINE HYDROCHLORIDE 4 UG/ML
.1-1.5 INJECTION, SOLUTION INTRAVENOUS CONTINUOUS
Status: DISCONTINUED | OUTPATIENT
Start: 2023-10-22 | End: 2023-10-26

## 2023-10-22 RX ORDER — FENTANYL CITRATE-0.9 % NACL/PF 20 MCG/2ML
50 SYRINGE (ML) INTRAVENOUS EVERY 30 MIN PRN
Status: DISCONTINUED | OUTPATIENT
Start: 2023-10-22 | End: 2023-10-24

## 2023-10-22 RX ORDER — SODIUM CHLORIDE 0.9 % (FLUSH) 0.9 %
5-40 SYRINGE (ML) INJECTION EVERY 12 HOURS SCHEDULED
Status: DISCONTINUED | OUTPATIENT
Start: 2023-10-22 | End: 2023-10-30 | Stop reason: HOSPADM

## 2023-10-22 RX ORDER — DEXTROSE AND SODIUM CHLORIDE 5; .45 G/100ML; G/100ML
INJECTION, SOLUTION INTRAVENOUS CONTINUOUS
Status: DISCONTINUED | OUTPATIENT
Start: 2023-10-22 | End: 2023-10-23

## 2023-10-22 RX ORDER — ENOXAPARIN SODIUM 100 MG/ML
40 INJECTION SUBCUTANEOUS DAILY
Status: DISCONTINUED | OUTPATIENT
Start: 2023-10-22 | End: 2023-10-30 | Stop reason: HOSPADM

## 2023-10-22 RX ORDER — POTASSIUM CHLORIDE 29.8 MG/ML
20 INJECTION INTRAVENOUS PRN
Status: DISCONTINUED | OUTPATIENT
Start: 2023-10-22 | End: 2023-10-30 | Stop reason: HOSPADM

## 2023-10-22 RX ORDER — POTASSIUM CHLORIDE 7.45 MG/ML
10 INJECTION INTRAVENOUS PRN
Status: DISCONTINUED | OUTPATIENT
Start: 2023-10-22 | End: 2023-10-30 | Stop reason: HOSPADM

## 2023-10-22 RX ORDER — FENTANYL CITRATE 50 UG/ML
50 INJECTION, SOLUTION INTRAMUSCULAR; INTRAVENOUS
Status: DISCONTINUED | OUTPATIENT
Start: 2023-10-22 | End: 2023-10-26

## 2023-10-22 RX ORDER — MAGNESIUM SULFATE IN WATER 40 MG/ML
2000 INJECTION, SOLUTION INTRAVENOUS PRN
Status: DISCONTINUED | OUTPATIENT
Start: 2023-10-22 | End: 2023-10-30 | Stop reason: HOSPADM

## 2023-10-22 RX ORDER — ACETAMINOPHEN 325 MG/1
650 TABLET ORAL EVERY 6 HOURS PRN
Status: DISCONTINUED | OUTPATIENT
Start: 2023-10-22 | End: 2023-10-30 | Stop reason: HOSPADM

## 2023-10-22 RX ORDER — ONDANSETRON 2 MG/ML
4 INJECTION INTRAMUSCULAR; INTRAVENOUS EVERY 6 HOURS PRN
Status: DISCONTINUED | OUTPATIENT
Start: 2023-10-22 | End: 2023-10-30 | Stop reason: HOSPADM

## 2023-10-22 RX ORDER — SODIUM CHLORIDE 9 MG/ML
INJECTION, SOLUTION INTRAVENOUS PRN
Status: DISCONTINUED | OUTPATIENT
Start: 2023-10-22 | End: 2023-10-30 | Stop reason: HOSPADM

## 2023-10-22 RX ORDER — SODIUM CHLORIDE 0.9 % (FLUSH) 0.9 %
5-40 SYRINGE (ML) INJECTION PRN
Status: DISCONTINUED | OUTPATIENT
Start: 2023-10-22 | End: 2023-10-30 | Stop reason: HOSPADM

## 2023-10-22 RX ADMIN — SODIUM BICARBONATE 50 MEQ: 84 INJECTION, SOLUTION INTRAVENOUS at 14:37

## 2023-10-22 RX ADMIN — DEXTROSE AND SODIUM CHLORIDE: 5; 450 INJECTION, SOLUTION INTRAVENOUS at 16:40

## 2023-10-22 RX ADMIN — FAMOTIDINE 20 MG: 10 INJECTION, SOLUTION INTRAVENOUS at 20:35

## 2023-10-22 RX ADMIN — SODIUM CHLORIDE, PRESERVATIVE FREE 10 ML: 5 INJECTION INTRAVENOUS at 20:38

## 2023-10-22 RX ADMIN — SODIUM BICARBONATE 50 MEQ: 84 INJECTION, SOLUTION INTRAVENOUS at 15:24

## 2023-10-22 RX ADMIN — SODIUM CHLORIDE: 9 INJECTION, SOLUTION INTRAVENOUS at 14:23

## 2023-10-22 RX ADMIN — ENOXAPARIN SODIUM 40 MG: 100 INJECTION SUBCUTANEOUS at 16:40

## 2023-10-22 ASSESSMENT — PULMONARY FUNCTION TESTS
PIF_VALUE: 18
PIF_VALUE: 18
PIF_VALUE: 14

## 2023-10-22 ASSESSMENT — PAIN - FUNCTIONAL ASSESSMENT: PAIN_FUNCTIONAL_ASSESSMENT: NONE - DENIES PAIN

## 2023-10-22 NOTE — PLAN OF CARE
Problem: Safety - Medical Restraint  Goal: Remains free of injury from restraints (Restraint for Interference with Medical Device)  Description: INTERVENTIONS:  1. Determine that other, less restrictive measures have been tried or would not be effective before applying the restraint  2. Evaluate the patient's condition at the time of restraint application  3. Inform patient/family regarding the reason for restraint  4.  Q2H: Monitor safety, psychosocial status, comfort, nutrition and hydration  Outcome: Progressing  Flowsheets (Taken 10/22/2023 1700)  Remains free of injury from restraints (restraint for interference with medical device):   Determine that other, less restrictive measures have been tried or would not be effective before applying the restraint   Evaluate the patient's condition at the time of restraint application   Inform patient/family regarding the reason for restraint   Every 2 hours: Monitor safety, psychosocial status, comfort, nutrition and hydration     Problem: Safety - Adult  Goal: Free from fall injury  Outcome: Progressing

## 2023-10-22 NOTE — ED NOTES
Dr. William Anderson order Sodium Bicarb - 2 amp- administered per ordered.       Fe Lau, RN  10/22/23 9870

## 2023-10-22 NOTE — H&P
[facility] Critical Care Note[de-identified] 10/22/2023  Domo Mcarthur  Admission Date: 10/22/2023     Length of Stay: 0 days    Background: 69 y.o. female brought in by EMS after  called. He found her unresponsive in the recliner about noon. Last known well last night at 11 PM.  Patient not able to give any history. EMS was called. They found her Dextrostix to be normal.  Gave her IV Narcan without any improvement. Blood pressure somewhat low. Told by  that the patient has a history of cancer and also history of depression. They brought bottles along with the patient which included empty bottles of Seroquel Ativan and temazepam.  These all were filled a week ago except for the temazepam which was filled yesterday. There were 90 Seroquel and Ativan prescribed. There were 30 temazepam prescribed. All bottles are empty.  states has never had SI or attempts in past. Has been depressed for about 5 years. Lost interest in doing anything or going anywhere, but was very happy and jovial prior to that. Notable PMH:  has a past medical history of Abdominal pain, right upper quadrant, Abnormal weight gain, Acute sinusitis, unspecified, Benign neoplasm of colon, Benign paroxysmal positional vertigo, Breast cancer ([address]), Calculus of gallbladder without mention of cholecystitis or obstruction, Depressive disorder, not elsewhere classified, Dysuria, Essential hypertension, benign, Insomnia, unspecified, Nausea alone, Neoplasm of uncertain behavior of skin, Other malaise and fatigue, Primary localized osteoarthrosis, unspecified site, Radiation therapy complication, Urinary tract infection, site not specified, and Vaginitis and vulvovaginitis, unspecified. 24 Hour events: Intubated in ED. Review of Systems: Unable to obtain due to patient factors.      Lines: (insertion date)   ETT  (Active)       Drips: current dose (range)        Pertinent Exam:         Blood pressure 95/65,

## 2023-10-22 NOTE — ED PROVIDER NOTES
Emergency Department Provider Note       PCP: Merlinda Bodo, MD   Age: 69 y.o. Sex: female     DISPOSITION Decision To Admit 10/22/2023 03:06:53 PM       ICD-10-CM    1. Intentional drug overdose, initial encounter ([address])  T50.902A       2. Intentional benzodiazepine overdose, initial encounter ([address])  T42.4X2A       3. Antidepressant overdose, intentional self-harm, initial encounter ([address])  T43.202A       4. [place] coma scale total score 3-8, at arrival to emergency department [facility])  [address]   , Due to suspected overdose. Patient without gag reflex. Will need to be intubated to protect airway. Will get ABG. Check screening lab work, ethanol, EKG. Head CT to rule out any sort of CNS stroke or trauma. Bolus due to hypotension. Will talk with poison control. Complexity of Problems Addressed:  1 or more acute illnesses that pose a threat to life or bodily function. Data Reviewed and Analyzed:   I independently ordered and reviewed each unique test.  I reviewed external records: provider visit note from PCP. The patients assessment required an independent historian: [place] and . The reason they were needed is  an altered level of consciousness and important historical information not provided by the patient. I independently ordered and interpreted the ED EKG Rate: 126 read in the absence of a Cardiologist.    Rate: 126  EKG Interpretation: EKG Interpretation: sinus rhythm, no evidence of arrhythmia  ST Segments: Nonspecific ST segments - NO STEMI       I interpreted the X-rays with radiologist.  Good position of endotracheal tube without infiltrates. I interpreted the CT Scan with radiologist.  No evidence for stroke or bleeding. .    Discussion of management or test interpretation. Patient had little gag reflex. She was intubated. Did not require any sedation whatsoever. No gag reflex. During intubation, patient started regurgitate.   I believe

## 2023-10-22 NOTE — ED NOTES
Pt arrives via [facility] coming from home after an overdose. Hx depression. Pt took what appears to be entire bottles of seroquel and ativan. Pt was found in the recliner unresponsive. Pt placed on 15 NRB by Fire. Pt responsive to painful stimuli.  2 narcan given en route           Mariano Loyola RN  10/22/23 8878

## 2023-10-22 NOTE — ED NOTES
TRANSFER - OUT REPORT:    Verbal report given to Anita Oneill on Pili Perla  being transferred to [facility] for routine progression of patient care       Report consisted of patient's Situation, Background, Assessment and   Recommendations(SBAR). Information from the following report(s) Nurse Handoff Report was reviewed with the receiving nurse. [place] Fall Assessment:                           Lines:   Peripheral IV 10/22/23 Proximal;Right; Anterior Forearm (Active)        Opportunity for questions and clarification was provided.       Patient transported with:  Monitor and Registered Nurse and RT           Edison Andrew RN  10/22/23 [phone]

## 2023-10-23 ENCOUNTER — APPOINTMENT (OUTPATIENT)
Dept: GENERAL RADIOLOGY | Age: 78
DRG: 917 | End: 2023-10-23
Payer: MEDICARE

## 2023-10-23 PROBLEM — T50.902A INTENTIONAL DRUG OVERDOSE (HCC): Status: ACTIVE | Noted: 2023-10-23

## 2023-10-23 LAB
ALBUMIN SERPL-MCNC: 3 G/DL (ref 3.2–4.6)
ALBUMIN/GLOB SERPL: 1 (ref 0.4–1.6)
ALP SERPL-CCNC: 64 U/L (ref 50–136)
ALT SERPL-CCNC: 18 U/L (ref 12–65)
ANION GAP SERPL CALC-SCNC: 5 MMOL/L (ref 2–11)
APAP SERPL-MCNC: <2 UG/ML (ref 10–30)
ARTERIAL PATENCY WRIST A: POSITIVE
AST SERPL-CCNC: 31 U/L (ref 15–37)
BASE EXCESS BLD CALC-SCNC: 2.5 MMOL/L
BASOPHILS # BLD: 0 K/UL (ref 0–0.2)
BASOPHILS NFR BLD: 0 % (ref 0–2)
BDY SITE: ABNORMAL
BILIRUB SERPL-MCNC: 0.4 MG/DL (ref 0.2–1.1)
BUN SERPL-MCNC: 20 MG/DL (ref 8–23)
CALCIUM SERPL-MCNC: 8.5 MG/DL (ref 8.3–10.4)
CHLORIDE SERPL-SCNC: 107 MMOL/L (ref 101–110)
CO2 SERPL-SCNC: 29 MMOL/L (ref 21–32)
CREAT SERPL-MCNC: 1.2 MG/DL (ref 0.6–1)
DIFFERENTIAL METHOD BLD: ABNORMAL
EKG ATRIAL RATE: 87 BPM
EKG DIAGNOSIS: NORMAL
EKG P AXIS: 67 DEGREES
EKG P-R INTERVAL: 156 MS
EKG Q-T INTERVAL: 386 MS
EKG QRS DURATION: 84 MS
EKG QTC CALCULATION (BAZETT): 464 MS
EKG R AXIS: 19 DEGREES
EKG T AXIS: 96 DEGREES
EKG VENTRICULAR RATE: 87 BPM
EOSINOPHIL # BLD: 0 K/UL (ref 0–0.8)
EOSINOPHIL NFR BLD: 0 % (ref 0.5–7.8)
ERYTHROCYTE [DISTWIDTH] IN BLOOD BY AUTOMATED COUNT: 13.6 % (ref 11.9–14.6)
GAS FLOW.O2 O2 DELIVERY SYS: ABNORMAL
GLOBULIN SER CALC-MCNC: 3.1 G/DL (ref 2.8–4.5)
GLUCOSE SERPL-MCNC: 179 MG/DL (ref 65–100)
HCO3 BLD-SCNC: 27.3 MMOL/L (ref 22–26)
HCT VFR BLD AUTO: 38.2 % (ref 35.8–46.3)
HGB BLD-MCNC: 12.7 G/DL (ref 11.7–15.4)
IMM GRANULOCYTES # BLD AUTO: 0 K/UL (ref 0–0.5)
IMM GRANULOCYTES NFR BLD AUTO: 0 % (ref 0–5)
INSPIRATION.DURATION SETTING TIME VENT: 0.9 SEC
IPAP/PIP/HIGH PEEP: 18
LYMPHOCYTES # BLD: 0.8 K/UL (ref 0.5–4.6)
LYMPHOCYTES NFR BLD: 10 % (ref 13–44)
MCH RBC QN AUTO: 30.9 PG (ref 26.1–32.9)
MCHC RBC AUTO-ENTMCNC: 33.2 G/DL (ref 31.4–35)
MCV RBC AUTO: 92.9 FL (ref 82–102)
MONOCYTES # BLD: 0.7 K/UL (ref 0.1–1.3)
MONOCYTES NFR BLD: 8 % (ref 4–12)
NEUTS SEG # BLD: 7 K/UL (ref 1.7–8.2)
NEUTS SEG NFR BLD: 82 % (ref 43–78)
NRBC # BLD: 0 K/UL (ref 0–0.2)
O2/TOTAL GAS SETTING VFR VENT: 30 %
PAW @ MEAN EXP FLOW ON VENT: 10 CMH2O
PCO2 BLD: 41.9 MMHG (ref 35–45)
PEEP RESPIRATORY: 8 CMH2O
PH BLD: 7.42 (ref 7.35–7.45)
PLATELET # BLD AUTO: 180 K/UL (ref 150–450)
PMV BLD AUTO: 11.1 FL (ref 9.4–12.3)
PO2 BLD: 130 MMHG (ref 75–100)
POTASSIUM SERPL-SCNC: 4.3 MMOL/L (ref 3.5–5.1)
PROT SERPL-MCNC: 6.1 G/DL (ref 6.3–8.2)
RBC # BLD AUTO: 4.11 M/UL (ref 4.05–5.2)
RESPIRATORY RATE, POC: 16 (ref 5–40)
SALICYLATES SERPL-MCNC: <1.7 MG/DL (ref 2.8–20)
SAO2 % BLD: 99 % (ref 95–98)
SERVICE CMNT-IMP: ABNORMAL
SERVICE CMNT-IMP: ABNORMAL
SODIUM SERPL-SCNC: 141 MMOL/L (ref 133–143)
SPECIMEN TYPE: ABNORMAL
TSH W FREE THYROID IF ABNORMAL: 0.79 UIU/ML (ref 0.36–3.74)
VENTILATION MODE VENT: ABNORMAL
VT SETTING VENT: 365 ML
WBC # BLD AUTO: 8.5 K/UL (ref 4.3–11.1)

## 2023-10-23 PROCEDURE — 71045 X-RAY EXAM CHEST 1 VIEW: CPT

## 2023-10-23 PROCEDURE — 85025 COMPLETE CBC W/AUTO DIFF WBC: CPT

## 2023-10-23 PROCEDURE — A4216 STERILE WATER/SALINE, 10 ML: HCPCS | Performed by: INTERNAL MEDICINE

## 2023-10-23 PROCEDURE — 6360000002 HC RX W HCPCS: Performed by: INTERNAL MEDICINE

## 2023-10-23 PROCEDURE — 2580000003 HC RX 258: Performed by: INTERNAL MEDICINE

## 2023-10-23 PROCEDURE — 6370000000 HC RX 637 (ALT 250 FOR IP): Performed by: INTERNAL MEDICINE

## 2023-10-23 PROCEDURE — 80179 DRUG ASSAY SALICYLATE: CPT

## 2023-10-23 PROCEDURE — 2500000003 HC RX 250 WO HCPCS: Performed by: INTERNAL MEDICINE

## 2023-10-23 PROCEDURE — 94003 VENT MGMT INPAT SUBQ DAY: CPT

## 2023-10-23 PROCEDURE — 93010 ELECTROCARDIOGRAM REPORT: CPT | Performed by: INTERNAL MEDICINE

## 2023-10-23 PROCEDURE — 36415 COLL VENOUS BLD VENIPUNCTURE: CPT

## 2023-10-23 PROCEDURE — 99291 CRITICAL CARE FIRST HOUR: CPT | Performed by: INTERNAL MEDICINE

## 2023-10-23 PROCEDURE — 82803 BLOOD GASES ANY COMBINATION: CPT

## 2023-10-23 PROCEDURE — 36600 WITHDRAWAL OF ARTERIAL BLOOD: CPT

## 2023-10-23 PROCEDURE — 2000000000 HC ICU R&B

## 2023-10-23 PROCEDURE — 84443 ASSAY THYROID STIM HORMONE: CPT

## 2023-10-23 PROCEDURE — 80053 COMPREHEN METABOLIC PANEL: CPT

## 2023-10-23 PROCEDURE — 80143 DRUG ASSAY ACETAMINOPHEN: CPT

## 2023-10-23 RX ORDER — LANOLIN ALCOHOL/MO/W.PET/CERES
100 CREAM (GRAM) TOPICAL DAILY
Status: DISCONTINUED | OUTPATIENT
Start: 2023-10-23 | End: 2023-10-26

## 2023-10-23 RX ORDER — SODIUM CHLORIDE, SODIUM LACTATE, POTASSIUM CHLORIDE, AND CALCIUM CHLORIDE .6; .31; .03; .02 G/100ML; G/100ML; G/100ML; G/100ML
1000 INJECTION, SOLUTION INTRAVENOUS ONCE
Status: COMPLETED | OUTPATIENT
Start: 2023-10-23 | End: 2023-10-23

## 2023-10-23 RX ORDER — POLYETHYLENE GLYCOL 3350 17 G/17G
17 POWDER, FOR SOLUTION ORAL DAILY
Status: DISCONTINUED | OUTPATIENT
Start: 2023-10-23 | End: 2023-10-27

## 2023-10-23 RX ORDER — SODIUM CHLORIDE, SODIUM LACTATE, POTASSIUM CHLORIDE, CALCIUM CHLORIDE 600; 310; 30; 20 MG/100ML; MG/100ML; MG/100ML; MG/100ML
INJECTION, SOLUTION INTRAVENOUS CONTINUOUS
Status: ACTIVE | OUTPATIENT
Start: 2023-10-23 | End: 2023-10-24

## 2023-10-23 RX ORDER — SENNOSIDES A AND B 8.6 MG/1
1 TABLET, FILM COATED ORAL NIGHTLY
Status: DISCONTINUED | OUTPATIENT
Start: 2023-10-23 | End: 2023-10-27

## 2023-10-23 RX ADMIN — SODIUM CHLORIDE, PRESERVATIVE FREE 10 ML: 5 INJECTION INTRAVENOUS at 09:18

## 2023-10-23 RX ADMIN — POLYETHYLENE GLYCOL 3350 17 G: 17 POWDER, FOR SOLUTION ORAL at 13:29

## 2023-10-23 RX ADMIN — SODIUM CHLORIDE, POTASSIUM CHLORIDE, SODIUM LACTATE AND CALCIUM CHLORIDE: 600; 310; 30; 20 INJECTION, SOLUTION INTRAVENOUS at 17:52

## 2023-10-23 RX ADMIN — SODIUM CHLORIDE, POTASSIUM CHLORIDE, SODIUM LACTATE AND CALCIUM CHLORIDE: 600; 310; 30; 20 INJECTION, SOLUTION INTRAVENOUS at 11:06

## 2023-10-23 RX ADMIN — Medication 100 MG: at 13:29

## 2023-10-23 RX ADMIN — SODIUM CHLORIDE, POTASSIUM CHLORIDE, SODIUM LACTATE AND CALCIUM CHLORIDE 1000 ML: 600; 310; 30; 20 INJECTION, SOLUTION INTRAVENOUS at 09:57

## 2023-10-23 RX ADMIN — SENNOSIDES 8.6 MG: 8.6 TABLET, FILM COATED ORAL at 21:38

## 2023-10-23 RX ADMIN — SODIUM CHLORIDE, PRESERVATIVE FREE 10 ML: 5 INJECTION INTRAVENOUS at 21:38

## 2023-10-23 RX ADMIN — FAMOTIDINE 20 MG: 10 INJECTION, SOLUTION INTRAVENOUS at 09:18

## 2023-10-23 RX ADMIN — DEXTROSE AND SODIUM CHLORIDE: 5; 450 INJECTION, SOLUTION INTRAVENOUS at 03:04

## 2023-10-23 RX ADMIN — ENOXAPARIN SODIUM 40 MG: 100 INJECTION SUBCUTANEOUS at 09:18

## 2023-10-23 ASSESSMENT — PULMONARY FUNCTION TESTS
PIF_VALUE: 18
PIF_VALUE: 19
PIF_VALUE: 19
PIF_VALUE: 18
PIF_VALUE: 19

## 2023-10-23 NOTE — ACP (ADVANCE CARE PLANNING)
Advance Care Planning     Advance Care Planning Activator (Inpatient)  Conversation Note      Date of ACP Conversation: 10/23/2023         ACP Activator: Regi Reese RN    {When Decision Maker makes decisions on behalf of the incapacitated patient: Decision Maker is asked to consider and make decisions based on patient values, known preferences, or best interests. Health Care Decision Maker: Durable POA copied and placed on paper chart, also scanned to medical records. Agent listed below. Current Designated Health Care Decision Maker:     Primary Decision Maker: Dulce Garcia - [phone]  Click here to complete Healthcare Decision Makers including section of the Healthcare Decision Maker Relationship (ie \"Primary\")  Today we documented Decision Maker(s) consistent with ACP documents on file.

## 2023-10-23 NOTE — INTERDISCIPLINARY ROUNDS
Multi-D Rounds/Checklist (leapfrog):  Lines: can any be removed?: None   ETT  (Active)     NG/OG/NJ/NE Tube Orogastric Center mouth (Active)       Urinary Catheter 10/22/23 Lew (Active)      DVT Prophylaxis: Ordered- lovenox   Vent: HOB elevated? Yes ;  mL/kg: N/A ; Vent day 2  Nutrition Ordered/appropriate: Contraindicated- will reassess   Can antibiotics or other drugs be stopped? N/A   Inpat Anti-Infectives (From admission, onward)      None          Consults needed: None  A: Is pain control adequate? (has PRNs? Stop drip?) Yes  B: Sedation break and SBT? N/A  C: Is sedation choice appropriate? N/A  D: Delirium/CAM-ICU? Unable to screen  E: Mobility goals/appropriateness? N/A  F: Family update and plan?  is surrogate decision maker and is being updated daily by primary attending and nursing staff.     Sandra Beatty, APRN - NP

## 2023-10-24 LAB
AMPHET UR QL SCN: NEGATIVE
ANION GAP SERPL CALC-SCNC: 7 MMOL/L (ref 2–11)
ARTERIAL PATENCY WRIST A: POSITIVE
BARBITURATES UR QL SCN: NEGATIVE
BASE EXCESS BLD CALC-SCNC: 1.8 MMOL/L
BASE EXCESS BLD CALC-SCNC: 3.1 MMOL/L
BASE EXCESS BLD CALC-SCNC: 3.4 MMOL/L
BDY SITE: ABNORMAL
BENZODIAZ UR QL: POSITIVE
BUN SERPL-MCNC: 17 MG/DL (ref 8–23)
CALCIUM SERPL-MCNC: 8.5 MG/DL (ref 8.3–10.4)
CANNABINOIDS UR QL SCN: NEGATIVE
CHLORIDE SERPL-SCNC: 107 MMOL/L (ref 101–110)
CO2 SERPL-SCNC: 27 MMOL/L (ref 21–32)
COCAINE UR QL SCN: NEGATIVE
CREAT SERPL-MCNC: 1 MG/DL (ref 0.6–1)
ERYTHROCYTE [DISTWIDTH] IN BLOOD BY AUTOMATED COUNT: 13.8 % (ref 11.9–14.6)
GAS FLOW.O2 O2 DELIVERY SYS: ABNORMAL
GLUCOSE SERPL-MCNC: 140 MG/DL (ref 65–100)
HCO3 BLD-SCNC: 26.1 MMOL/L (ref 22–26)
HCO3 BLD-SCNC: 26.8 MMOL/L (ref 22–26)
HCO3 BLD-SCNC: 27.2 MMOL/L (ref 22–26)
HCT VFR BLD AUTO: 37.7 % (ref 35.8–46.3)
HGB BLD-MCNC: 12.4 G/DL (ref 11.7–15.4)
INSPIRATION.DURATION SETTING TIME VENT: 0.9 SEC
INSPIRATION.DURATION SETTING TIME VENT: 0.9 SEC
IPAP/PIP/HIGH PEEP: 19
IPAP/PIP/HIGH PEEP: 19
IPAP/PIP/HIGH PEEP: 20
MAGNESIUM SERPL-MCNC: 2.3 MG/DL (ref 1.8–2.4)
MCH RBC QN AUTO: 31.2 PG (ref 26.1–32.9)
MCHC RBC AUTO-ENTMCNC: 32.9 G/DL (ref 31.4–35)
MCV RBC AUTO: 94.7 FL (ref 82–102)
METHADONE UR QL: NEGATIVE
NRBC # BLD: 0 K/UL (ref 0–0.2)
O2/TOTAL GAS SETTING VFR VENT: 25 %
OPIATES UR QL: NEGATIVE
PAW @ MEAN EXP FLOW ON VENT: 11 CMH2O
PCO2 BLD: 35.8 MMHG (ref 35–45)
PCO2 BLD: 38.7 MMHG (ref 35–45)
PCO2 BLD: 39 MMHG (ref 35–45)
PCP UR QL: NEGATIVE
PEEP RESPIRATORY: 8 CMH2O
PH BLD: 7.43 (ref 7.35–7.45)
PH BLD: 7.46 (ref 7.35–7.45)
PH BLD: 7.48 (ref 7.35–7.45)
PHOSPHATE SERPL-MCNC: 2.8 MG/DL (ref 2.3–3.7)
PLATELET # BLD AUTO: 146 K/UL (ref 150–450)
PMV BLD AUTO: 11.6 FL (ref 9.4–12.3)
PO2 BLD: 102 MMHG (ref 75–100)
PO2 BLD: 95 MMHG (ref 75–100)
PO2 BLD: 99 MMHG (ref 75–100)
POTASSIUM SERPL-SCNC: 4.2 MMOL/L (ref 3.5–5.1)
PRESSURE SUPPORT SETTING VENT: 12 CMH2O
RBC # BLD AUTO: 3.98 M/UL (ref 4.05–5.2)
RESPIRATORY RATE, POC: 12 (ref 5–40)
RESPIRATORY RATE, POC: 17 (ref 5–40)
SAO2 % BLD: 97.9 % (ref 95–98)
SAO2 % BLD: 97.9 % (ref 95–98)
SAO2 % BLD: 98.2 % (ref 95–98)
SERVICE CMNT-IMP: ABNORMAL
SODIUM SERPL-SCNC: 141 MMOL/L (ref 133–143)
SPECIMEN TYPE: ABNORMAL
VENTILATION MODE VENT: ABNORMAL
VT SETTING VENT: 365 ML
VT SETTING VENT: 365 ML
WBC # BLD AUTO: 11.1 K/UL (ref 4.3–11.1)

## 2023-10-24 PROCEDURE — 85027 COMPLETE CBC AUTOMATED: CPT

## 2023-10-24 PROCEDURE — 36415 COLL VENOUS BLD VENIPUNCTURE: CPT

## 2023-10-24 PROCEDURE — 82803 BLOOD GASES ANY COMBINATION: CPT

## 2023-10-24 PROCEDURE — 2000000000 HC ICU R&B

## 2023-10-24 PROCEDURE — 36600 WITHDRAWAL OF ARTERIAL BLOOD: CPT

## 2023-10-24 PROCEDURE — 6370000000 HC RX 637 (ALT 250 FOR IP): Performed by: INTERNAL MEDICINE

## 2023-10-24 PROCEDURE — 2580000003 HC RX 258: Performed by: INTERNAL MEDICINE

## 2023-10-24 PROCEDURE — 80307 DRUG TEST PRSMV CHEM ANLYZR: CPT

## 2023-10-24 PROCEDURE — 83735 ASSAY OF MAGNESIUM: CPT

## 2023-10-24 PROCEDURE — A4216 STERILE WATER/SALINE, 10 ML: HCPCS | Performed by: INTERNAL MEDICINE

## 2023-10-24 PROCEDURE — 2500000003 HC RX 250 WO HCPCS: Performed by: INTERNAL MEDICINE

## 2023-10-24 PROCEDURE — 6360000002 HC RX W HCPCS: Performed by: INTERNAL MEDICINE

## 2023-10-24 PROCEDURE — 99291 CRITICAL CARE FIRST HOUR: CPT | Performed by: INTERNAL MEDICINE

## 2023-10-24 PROCEDURE — 80048 BASIC METABOLIC PNL TOTAL CA: CPT

## 2023-10-24 PROCEDURE — 84100 ASSAY OF PHOSPHORUS: CPT

## 2023-10-24 RX ADMIN — SODIUM CHLORIDE, PRESERVATIVE FREE 10 ML: 5 INJECTION INTRAVENOUS at 20:50

## 2023-10-24 RX ADMIN — SODIUM CHLORIDE, POTASSIUM CHLORIDE, SODIUM LACTATE AND CALCIUM CHLORIDE: 600; 310; 30; 20 INJECTION, SOLUTION INTRAVENOUS at 07:38

## 2023-10-24 RX ADMIN — SENNOSIDES 8.6 MG: 8.6 TABLET, FILM COATED ORAL at 20:49

## 2023-10-24 RX ADMIN — SODIUM CHLORIDE, POTASSIUM CHLORIDE, SODIUM LACTATE AND CALCIUM CHLORIDE: 600; 310; 30; 20 INJECTION, SOLUTION INTRAVENOUS at 00:36

## 2023-10-24 RX ADMIN — FAMOTIDINE 20 MG: 10 INJECTION, SOLUTION INTRAVENOUS at 10:26

## 2023-10-24 RX ADMIN — SODIUM CHLORIDE, PRESERVATIVE FREE 10 ML: 5 INJECTION INTRAVENOUS at 10:28

## 2023-10-24 RX ADMIN — Medication 100 MG: at 10:28

## 2023-10-24 RX ADMIN — POLYETHYLENE GLYCOL 3350 17 G: 17 POWDER, FOR SOLUTION ORAL at 10:26

## 2023-10-24 RX ADMIN — ENOXAPARIN SODIUM 40 MG: 100 INJECTION SUBCUTANEOUS at 10:25

## 2023-10-24 ASSESSMENT — PULMONARY FUNCTION TESTS
PIF_VALUE: 19
PIF_VALUE: 19
PIF_VALUE: 17
PIF_VALUE: 19
PIF_VALUE: 18
PIF_VALUE: 20
PIF_VALUE: 18

## 2023-10-24 NOTE — INTERDISCIPLINARY ROUNDS
Multi-D Rounds/Checklist (leapfrog):  Lines: can any be removed?: None   ETT  (Active)     NG/OG/NJ/NE Tube Orogastric Center mouth (Active)       Urinary Catheter 10/22/23 Lew (Active)      DVT Prophylaxis: Ordered- lovenox   Vent: HOB elevated? Yes ;  mL/kg: N/A ; Vent day 3  Nutrition Ordered/appropriate: Ordered tube feeds   Can antibiotics or other drugs be stopped? N/A   \  Inpat Anti-Infectives (From admission, onward)      None          Consults needed: None  A: Is pain control adequate? (has PRNs? Stop drip?) Yes  B: Sedation break and SBT? N/A  C: Is sedation choice appropriate? N/A  D: Delirium/CAM-ICU? Unable to screen  E: Mobility goals/appropriateness? N/A  F: Family update and plan?  is surrogate decision maker and is being updated daily by primary attending and nursing staff.     Dionicio Summers, APRN - NP

## 2023-10-25 LAB
ANION GAP SERPL CALC-SCNC: 5 MMOL/L (ref 2–11)
BUN SERPL-MCNC: 14 MG/DL (ref 8–23)
CALCIUM SERPL-MCNC: 8.9 MG/DL (ref 8.3–10.4)
CHLORIDE SERPL-SCNC: 105 MMOL/L (ref 101–110)
CO2 SERPL-SCNC: 30 MMOL/L (ref 21–32)
CREAT SERPL-MCNC: 0.9 MG/DL (ref 0.6–1)
EKG ATRIAL RATE: 72 BPM
EKG DIAGNOSIS: NORMAL
EKG P AXIS: 60 DEGREES
EKG P-R INTERVAL: 154 MS
EKG Q-T INTERVAL: 380 MS
EKG QRS DURATION: 92 MS
EKG QTC CALCULATION (BAZETT): 416 MS
EKG R AXIS: 2 DEGREES
EKG T AXIS: 69 DEGREES
EKG VENTRICULAR RATE: 72 BPM
ERYTHROCYTE [DISTWIDTH] IN BLOOD BY AUTOMATED COUNT: 13.8 % (ref 11.9–14.6)
GLUCOSE SERPL-MCNC: 109 MG/DL (ref 65–100)
HCT VFR BLD AUTO: 36.1 % (ref 35.8–46.3)
HGB BLD-MCNC: 11.9 G/DL (ref 11.7–15.4)
MAGNESIUM SERPL-MCNC: 1.9 MG/DL (ref 1.8–2.4)
MCH RBC QN AUTO: 31.2 PG (ref 26.1–32.9)
MCHC RBC AUTO-ENTMCNC: 33 G/DL (ref 31.4–35)
MCV RBC AUTO: 94.5 FL (ref 82–102)
NRBC # BLD: 0 K/UL (ref 0–0.2)
PHOSPHATE SERPL-MCNC: 2.7 MG/DL (ref 2.3–3.7)
PLATELET # BLD AUTO: 143 K/UL (ref 150–450)
PMV BLD AUTO: 11.3 FL (ref 9.4–12.3)
POTASSIUM SERPL-SCNC: 3.9 MMOL/L (ref 3.5–5.1)
RBC # BLD AUTO: 3.82 M/UL (ref 4.05–5.2)
SODIUM SERPL-SCNC: 140 MMOL/L (ref 133–143)
WBC # BLD AUTO: 11.2 K/UL (ref 4.3–11.1)

## 2023-10-25 PROCEDURE — 6370000000 HC RX 637 (ALT 250 FOR IP): Performed by: INTERNAL MEDICINE

## 2023-10-25 PROCEDURE — 2000000000 HC ICU R&B

## 2023-10-25 PROCEDURE — 80048 BASIC METABOLIC PNL TOTAL CA: CPT

## 2023-10-25 PROCEDURE — 92610 EVALUATE SWALLOWING FUNCTION: CPT

## 2023-10-25 PROCEDURE — 36415 COLL VENOUS BLD VENIPUNCTURE: CPT

## 2023-10-25 PROCEDURE — 2580000003 HC RX 258: Performed by: INTERNAL MEDICINE

## 2023-10-25 PROCEDURE — A4216 STERILE WATER/SALINE, 10 ML: HCPCS | Performed by: INTERNAL MEDICINE

## 2023-10-25 PROCEDURE — 93010 ELECTROCARDIOGRAM REPORT: CPT | Performed by: INTERNAL MEDICINE

## 2023-10-25 PROCEDURE — 93005 ELECTROCARDIOGRAM TRACING: CPT | Performed by: INTERNAL MEDICINE

## 2023-10-25 PROCEDURE — 84100 ASSAY OF PHOSPHORUS: CPT

## 2023-10-25 PROCEDURE — 83735 ASSAY OF MAGNESIUM: CPT

## 2023-10-25 PROCEDURE — 6360000002 HC RX W HCPCS: Performed by: INTERNAL MEDICINE

## 2023-10-25 PROCEDURE — 85027 COMPLETE CBC AUTOMATED: CPT

## 2023-10-25 PROCEDURE — 2500000003 HC RX 250 WO HCPCS: Performed by: INTERNAL MEDICINE

## 2023-10-25 PROCEDURE — 94003 VENT MGMT INPAT SUBQ DAY: CPT

## 2023-10-25 PROCEDURE — 99233 SBSQ HOSP IP/OBS HIGH 50: CPT | Performed by: INTERNAL MEDICINE

## 2023-10-25 PROCEDURE — 2700000000 HC OXYGEN THERAPY PER DAY

## 2023-10-25 RX ADMIN — SENNOSIDES 8.6 MG: 8.6 TABLET, FILM COATED ORAL at 20:18

## 2023-10-25 RX ADMIN — SODIUM CHLORIDE, PRESERVATIVE FREE 10 ML: 5 INJECTION INTRAVENOUS at 20:19

## 2023-10-25 RX ADMIN — ACETAMINOPHEN 650 MG: 325 TABLET ORAL at 19:49

## 2023-10-25 RX ADMIN — Medication 100 MG: at 09:29

## 2023-10-25 RX ADMIN — POLYETHYLENE GLYCOL 3350 17 G: 17 POWDER, FOR SOLUTION ORAL at 09:29

## 2023-10-25 RX ADMIN — FAMOTIDINE 20 MG: 10 INJECTION, SOLUTION INTRAVENOUS at 09:29

## 2023-10-25 RX ADMIN — ENOXAPARIN SODIUM 40 MG: 100 INJECTION SUBCUTANEOUS at 09:29

## 2023-10-25 RX ADMIN — DEXMEDETOMIDINE 0.2 MCG/KG/HR: 100 INJECTION, SOLUTION INTRAVENOUS at 03:08

## 2023-10-25 RX ADMIN — SODIUM CHLORIDE, PRESERVATIVE FREE 10 ML: 5 INJECTION INTRAVENOUS at 09:44

## 2023-10-25 ASSESSMENT — PAIN SCALES - GENERAL
PAINLEVEL_OUTOF10: 0

## 2023-10-25 ASSESSMENT — PULMONARY FUNCTION TESTS
PIF_VALUE: 20
PIF_VALUE: 27

## 2023-10-25 NOTE — INTERDISCIPLINARY ROUNDS
Multi-D Rounds/Checklist (leapfrog):  Lines: can any be removed?: None   ETT  (Active)     NG/OG/NJ/NE Tube Orogastric Center mouth (Active)       Urinary Catheter 10/22/23 Lew (Active)      DVT Prophylaxis: Ordered- lovenox   Vent: HOB elevated? Yes ;  mL/kg: N/A ; Vent day 3  Nutrition Ordered/appropriate: Ordered tube feeds   Can antibiotics or other drugs be stopped? N/A     Inpat Anti-Infectives (From admission, onward)      None          Consults needed: None  A: Is pain control adequate? (has PRNs? Stop drip?) Yes  B: Sedation break and SBT? Yes  C: Is sedation choice appropriate? Yes  D: Delirium/CAM-ICU? Unable to screen  E: Mobility goals/appropriateness? N/A  F: Family update and plan?  is surrogate decision maker and is being updated daily by primary attending and nursing staff.     LAURA Hamilton - NP

## 2023-10-26 PROBLEM — F32.A ANXIETY AND DEPRESSION: Status: ACTIVE | Noted: 2023-10-26

## 2023-10-26 PROBLEM — F33.9 RECURRENT MAJOR DEPRESSION RESISTANT TO TREATMENT (HCC): Status: ACTIVE | Noted: 2023-10-26

## 2023-10-26 PROBLEM — R45.89 HOPELESSNESS: Status: ACTIVE | Noted: 2023-10-26

## 2023-10-26 PROBLEM — F41.9 ANXIETY AND DEPRESSION: Status: ACTIVE | Noted: 2023-10-26

## 2023-10-26 PROBLEM — G47.9 SLEEP DISTURBANCES: Status: ACTIVE | Noted: 2023-10-26

## 2023-10-26 PROBLEM — F39 UNSPECIFIED MOOD (AFFECTIVE) DISORDER (HCC): Status: ACTIVE | Noted: 2023-10-26

## 2023-10-26 LAB
ANION GAP SERPL CALC-SCNC: 2 MMOL/L (ref 2–11)
BUN SERPL-MCNC: 14 MG/DL (ref 8–23)
CALCIUM SERPL-MCNC: 8.8 MG/DL (ref 8.3–10.4)
CHLORIDE SERPL-SCNC: 104 MMOL/L (ref 101–110)
CO2 SERPL-SCNC: 31 MMOL/L (ref 21–32)
CREAT SERPL-MCNC: 0.8 MG/DL (ref 0.6–1)
EKG ATRIAL RATE: 83 BPM
EKG DIAGNOSIS: NORMAL
EKG P AXIS: 32 DEGREES
EKG P-R INTERVAL: 148 MS
EKG Q-T INTERVAL: 368 MS
EKG QRS DURATION: 88 MS
EKG QTC CALCULATION (BAZETT): 432 MS
EKG R AXIS: -4 DEGREES
EKG T AXIS: 37 DEGREES
EKG VENTRICULAR RATE: 83 BPM
ERYTHROCYTE [DISTWIDTH] IN BLOOD BY AUTOMATED COUNT: 13.2 % (ref 11.9–14.6)
GLUCOSE SERPL-MCNC: 82 MG/DL (ref 65–100)
HCT VFR BLD AUTO: 35.5 % (ref 35.8–46.3)
HGB BLD-MCNC: 11.5 G/DL (ref 11.7–15.4)
MAGNESIUM SERPL-MCNC: 1.9 MG/DL (ref 1.8–2.4)
MCH RBC QN AUTO: 30.5 PG (ref 26.1–32.9)
MCHC RBC AUTO-ENTMCNC: 32.4 G/DL (ref 31.4–35)
MCV RBC AUTO: 94.2 FL (ref 82–102)
NRBC # BLD: 0 K/UL (ref 0–0.2)
PHOSPHATE SERPL-MCNC: 2.8 MG/DL (ref 2.3–3.7)
PLATELET # BLD AUTO: 154 K/UL (ref 150–450)
PMV BLD AUTO: 11.5 FL (ref 9.4–12.3)
POTASSIUM SERPL-SCNC: 3.5 MMOL/L (ref 3.5–5.1)
RBC # BLD AUTO: 3.77 M/UL (ref 4.05–5.2)
SODIUM SERPL-SCNC: 137 MMOL/L (ref 133–143)
WBC # BLD AUTO: 8.5 K/UL (ref 4.3–11.1)

## 2023-10-26 PROCEDURE — 2580000003 HC RX 258

## 2023-10-26 PROCEDURE — 2500000003 HC RX 250 WO HCPCS: Performed by: INTERNAL MEDICINE

## 2023-10-26 PROCEDURE — 93010 ELECTROCARDIOGRAM REPORT: CPT | Performed by: INTERNAL MEDICINE

## 2023-10-26 PROCEDURE — 6360000002 HC RX W HCPCS: Performed by: INTERNAL MEDICINE

## 2023-10-26 PROCEDURE — 2580000003 HC RX 258: Performed by: INTERNAL MEDICINE

## 2023-10-26 PROCEDURE — 6370000000 HC RX 637 (ALT 250 FOR IP): Performed by: INTERNAL MEDICINE

## 2023-10-26 PROCEDURE — 93005 ELECTROCARDIOGRAM TRACING: CPT | Performed by: INTERNAL MEDICINE

## 2023-10-26 PROCEDURE — 84100 ASSAY OF PHOSPHORUS: CPT

## 2023-10-26 PROCEDURE — 85027 COMPLETE CBC AUTOMATED: CPT

## 2023-10-26 PROCEDURE — 90792 PSYCH DIAG EVAL W/MED SRVCS: CPT | Performed by: NURSE PRACTITIONER

## 2023-10-26 PROCEDURE — 6360000002 HC RX W HCPCS

## 2023-10-26 PROCEDURE — 97161 PT EVAL LOW COMPLEX 20 MIN: CPT

## 2023-10-26 PROCEDURE — 97530 THERAPEUTIC ACTIVITIES: CPT

## 2023-10-26 PROCEDURE — 36415 COLL VENOUS BLD VENIPUNCTURE: CPT

## 2023-10-26 PROCEDURE — 99233 SBSQ HOSP IP/OBS HIGH 50: CPT | Performed by: INTERNAL MEDICINE

## 2023-10-26 PROCEDURE — 80048 BASIC METABOLIC PNL TOTAL CA: CPT

## 2023-10-26 PROCEDURE — 92526 ORAL FUNCTION THERAPY: CPT

## 2023-10-26 PROCEDURE — 2500000003 HC RX 250 WO HCPCS: Performed by: NURSE PRACTITIONER

## 2023-10-26 PROCEDURE — 83735 ASSAY OF MAGNESIUM: CPT

## 2023-10-26 PROCEDURE — 2140000000 HC CCU INTERMEDIATE R&B

## 2023-10-26 PROCEDURE — A4216 STERILE WATER/SALINE, 10 ML: HCPCS | Performed by: INTERNAL MEDICINE

## 2023-10-26 RX ORDER — GUAIFENESIN 200 MG/10ML
200 LIQUID ORAL EVERY 4 HOURS PRN
Status: DISCONTINUED | OUTPATIENT
Start: 2023-10-26 | End: 2023-10-30 | Stop reason: HOSPADM

## 2023-10-26 RX ORDER — LANOLIN ALCOHOL/MO/W.PET/CERES
100 CREAM (GRAM) TOPICAL DAILY
Status: COMPLETED | OUTPATIENT
Start: 2023-10-26 | End: 2023-10-28

## 2023-10-26 RX ORDER — LORAZEPAM 0.5 MG/1
0.5 TABLET ORAL EVERY 12 HOURS PRN
Status: DISCONTINUED | OUTPATIENT
Start: 2023-10-26 | End: 2023-10-30 | Stop reason: HOSPADM

## 2023-10-26 RX ORDER — OLANZAPINE 5 MG/1
2.5 TABLET ORAL NIGHTLY
Status: DISCONTINUED | OUTPATIENT
Start: 2023-10-26 | End: 2023-10-30 | Stop reason: HOSPADM

## 2023-10-26 RX ORDER — FLUOXETINE HYDROCHLORIDE 20 MG/1
20 CAPSULE ORAL DAILY
Status: DISCONTINUED | OUTPATIENT
Start: 2023-10-27 | End: 2023-10-28

## 2023-10-26 RX ADMIN — SODIUM CHLORIDE, PRESERVATIVE FREE 10 ML: 5 INJECTION INTRAVENOUS at 20:44

## 2023-10-26 RX ADMIN — Medication 100 MG: at 09:27

## 2023-10-26 RX ADMIN — SODIUM CHLORIDE, PRESERVATIVE FREE 10 ML: 5 INJECTION INTRAVENOUS at 09:28

## 2023-10-26 RX ADMIN — OLANZAPINE 2.5 MG: 5 TABLET, FILM COATED ORAL at 20:43

## 2023-10-26 RX ADMIN — SENNOSIDES 8.6 MG: 8.6 TABLET, FILM COATED ORAL at 20:43

## 2023-10-26 RX ADMIN — FAMOTIDINE 20 MG: 10 INJECTION, SOLUTION INTRAVENOUS at 09:26

## 2023-10-26 RX ADMIN — GUAIFENESIN 200 MG: 200 SOLUTION ORAL at 20:56

## 2023-10-26 RX ADMIN — ENOXAPARIN SODIUM 40 MG: 100 INJECTION SUBCUTANEOUS at 09:27

## 2023-10-26 RX ADMIN — OLANZAPINE 5 MG: 10 INJECTION, POWDER, FOR SOLUTION INTRAMUSCULAR at 02:57

## 2023-10-26 RX ADMIN — TUBERCULIN PURIFIED PROTEIN DERIVATIVE 5 UNITS: 5 INJECTION, SOLUTION INTRADERMAL at 09:56

## 2023-10-26 ASSESSMENT — PATIENT HEALTH QUESTIONNAIRE - PHQ9
8. MOVING OR SPEAKING SO SLOWLY THAT OTHER PEOPLE COULD HAVE NOTICED. OR THE OPPOSITE, BEING SO FIGETY OR RESTLESS THAT YOU HAVE BEEN MOVING AROUND A LOT MORE THAN USUAL: 0
SUM OF ALL RESPONSES TO PHQ QUESTIONS 1-9: 18
7. TROUBLE CONCENTRATING ON THINGS, SUCH AS READING THE NEWSPAPER OR WATCHING TELEVISION: 1
3. TROUBLE FALLING OR STAYING ASLEEP: 3
4. FEELING TIRED OR HAVING LITTLE ENERGY: 3
6. FEELING BAD ABOUT YOURSELF - OR THAT YOU ARE A FAILURE OR HAVE LET YOURSELF OR YOUR FAMILY DOWN: 3
9. THOUGHTS THAT YOU WOULD BE BETTER OFF DEAD, OR OF HURTING YOURSELF: 3
5. POOR APPETITE OR OVEREATING: 2
SUM OF ALL RESPONSES TO PHQ QUESTIONS 1-9: 21
2. FEELING DOWN, DEPRESSED OR HOPELESS: 3
SUM OF ALL RESPONSES TO PHQ9 QUESTIONS 1 & 2: 6
SUM OF ALL RESPONSES TO PHQ QUESTIONS 1-9: 21
1. LITTLE INTEREST OR PLEASURE IN DOING THINGS: 3
SUM OF ALL RESPONSES TO PHQ QUESTIONS 1-9: 21

## 2023-10-26 ASSESSMENT — ANXIETY QUESTIONNAIRES
3. WORRYING TOO MUCH ABOUT DIFFERENT THINGS: 3
6. BECOMING EASILY ANNOYED OR IRRITABLE: 1
4. TROUBLE RELAXING: 3
2. NOT BEING ABLE TO STOP OR CONTROL WORRYING: 3
GAD7 TOTAL SCORE: 17
1. FEELING NERVOUS, ANXIOUS, OR ON EDGE: 3
7. FEELING AFRAID AS IF SOMETHING AWFUL MIGHT HAPPEN: 3
5. BEING SO RESTLESS THAT IT IS HARD TO SIT STILL: 1

## 2023-10-26 ASSESSMENT — PAIN SCALES - GENERAL
PAINLEVEL_OUTOF10: 0

## 2023-10-26 NOTE — PLAN OF CARE
Responded to bedside post patient fall. She states she slid out of the recliner to her knees then laid down on the floor. Denies hitting head. Mental status unchanged- mildly confused. Left knee slightly reddened with abrasions on middle 2 fingers of right hand. Assisted back to recliner. Denies any pain. RN's monitoring closely and informing family. No orders placed at this time.      LAURA Hargrove - NP

## 2023-10-26 NOTE — INTERDISCIPLINARY ROUNDS
Multi-D Rounds/Checklist (leapfrog):  Lines: can any be removed?: None        Urinary Catheter 10/22/23 Lew (Active)      DVT Prophylaxis: Ordered- lovenox   Vent: N/A  Nutrition Ordered/appropriate: Ordered tube feeds   Can antibiotics or other drugs be stopped? N/A     Inpat Anti-Infectives (From admission, onward)      None          Consults needed: None  A: Is pain control adequate? (has PRNs? Stop drip?) Yes  B: Sedation break and SBT? Yes  C: Is sedation choice appropriate? Yes  D: Delirium/CAM-ICU? Unable to screen  E: Mobility goals/appropriateness? N/A  F: Family update and plan?  is surrogate decision maker and is being updated daily by primary attending and nursing staff.     LAURA Molina - NP

## 2023-10-26 NOTE — CONSULTS
Comprehensive Nutrition Assessment    Type and Reason for Visit: Initial  Consult for Tube Feeding Management (Pulmonology)    Nutrition Recommendations/Plan:   Enteral Nutrition:   Enteral Access: Orogastric  Initiate  Formula: Peptide Based (Vital AF 1.2 Sumeet)  Goal Rate: Continuous 15 ml/hr  Initiate  Water flush  30 ml every 4 hours  Modulars: None not indicated at this time   Enteral regimen at above goal to provide per 24 hours:  396 calories, 30 grams protein and 321 ml free fluid. Above regimen: Trophic enteral nutrition (defined as 10-20 kcal/hr or up to 500 kcal/day), not intended to meet macronutrient needs  IV Fluids:  Continue per MD order  Labs:   EN labs: BMP daily, Mg daily x 3 days at initiation then MWF and Phos daily x 3 days at initiation then MWF. POC Glucoses/SSI Not indicated  Nutrition Related Medication Management:  Electrolyte Replacement Protocol PRN Continue for Potassium, Phosphorus, and Magnesium  Thiamine 100 mg daily x 7 days (EOT 10/29)  Bowel Regimen Glycolax daily and Senokot daily   Continue NPO status     Malnutrition Assessment:  Malnutrition Status: At risk for malnutrition (Comment) (NPO status, need for nutrition support, critically ill)     Nutrition Assessment:  Nutrition History: Spoke with patient  and granddaughter in room. Reports intermittent intake prior to admission (several days of good PO following several days of poor PO intake). No recent changes in weight noted per family. Consistent with weight hx: 12/20/22: 146 lbs, 7/18/23: 150 lbs. Current bed weight of 157 lbs.  reports constipation at baseline - taking laxative 1x per week. Do You Have Any Cultural, Temple, or Ethnic Food Preferences?: No   Nutrition Background:       PMH: abdominal pain, acute sinusitis, vertigo, breast cancer, calculus of gallbladder, depressive disorder, dysuria, HTN, insomnia. Pt presented to [facility] after taking large doses of Seroquel and ativan.  Admitted for
Hospitalist History and Physical   Admit Date:  10/22/2023  1:15 PM   Name:  Matt Tobar   Age:  69 y.o. Sex:  female  :  1945   MRN:  [id number]   Room:      Presenting/Chief Complaint: Drug Overdose     Reason(s) for Admission: Intentional drug overdose, initial encounter ([address]) [T50.902A]  Intentional benzodiazepine overdose, initial encounter ([address]) [T42.4X2A]  Polysubstance overdose, accidental or unintentional, initial encounter [T50.901A]  Antidepressant overdose, intentional self-harm, initial encounter ([address]) [T43.202A]  Gilbert coma scale total score 3-8, at arrival to emergency department [facility]) [T21.1262]     History of Present Illness:       Matt Tobar is a 69 y.o. female with medical history of breast cancer, depression admitted with intentional antidepressant overdose. She was found nonresponsive at home in possession of empty bottles of ativan, seroquel, restoril    Did not respond to narcan in field   She required ET intubation in ED 10-22-23 and extubated 10-25-23. She has been on room air since extubated. Seen by psychiatry 10-26-23  IVC papers completed    QTC monitored on tele       She will need inpatient psychiatry placement         Daughter/  at bedside  Slipped from chair this afternoon           Assessment & Plan:     Principal Problem:    Polysubstance overdose, accidental or unintentional, initial encounter  Plan:    Active Problems:    Recurrent depression ([address])  Plan:   Pending transfer to tele for ongoing QTC monitoring  Seen by psychiatry today  On IVC papers  Has sitter   Will need inpatient psychiatric management   No current benzo withdrawals noted   Add back ativan 0.5 mg orally every 12 hours as needed as recommended by psychiatry in event of withdrawals  Add zyprexa /prozac per psychiatry recommendations                 Acute hypoxemic respiratory failure ([address])  Plan:   On room air           LALY (acute kidney injury) ([address])  Plan:
at 75 ml, still on PRVC on vent with minimal FIO2 of 25%. Trying to open eyes to stimulus but will not follow commands. Family at bedside. 10- - RT note - Ventilator check complete; patient has a #7.5 ET tube secured at the 22 at the lip. Patient is  sedated. Patient is not able to follow commands. Breath sounds are diminished. Trachea is midline, Negative for subcutaneous air, and chest excursion is symmetric. Patient is also Negative for cyanosis and is Negative for pitting edema. All alarms are set and audible. Resuscitation bag is  at the head of the bed. CM note - Pt chart reviewed and pt discussed in am IDR. Pt seen and spoke with spouse at bedside. Currently intubated/vent. Spouse confirms demographics/screen. Pt lives with spouse. They have been  for almost 60 years. They have 4 adult children, one is . Two live local, one is out of state. Independent of ADL's. No current DME's for ambulation, HH, or rehab. Spouse states pt has dealt with depression for a while. Poss d/c needs/POC discussed pending recovery and when off vent. Verbalizes understanding. Discussed medical, physical, and then mental recovery and poss needs. CM to follow. 10- - RN note - [facility] notified of pt at this time. Pulmonology note - 24 Hour events: Still on assist control and 25% FIO2 on vent. Still not breathing over vent. Opens eyes periodically but still not following commands. Attempted a spontaneous breathing trial and pt was apneic. Has brown thick secretions coming from nares, RN suctioned moderate amount of brown secretions from both nasal cavities. + gag when performing mouth care. Will check UDS for any residual benzos    Impression: Mrs. Candelaria Ache remains completely apneic, though responds to pain. Holding all sedatives. Discussed the serious nature of her encephalopathy with her .       Encephalopathy  benzo/seroquel overdose remains likely an active

## 2023-10-27 LAB
ANION GAP SERPL CALC-SCNC: 5 MMOL/L (ref 2–11)
BASOPHILS # BLD: 0 K/UL (ref 0–0.2)
BASOPHILS NFR BLD: 1 % (ref 0–2)
BUN SERPL-MCNC: 13 MG/DL (ref 8–23)
CALCIUM SERPL-MCNC: 8.5 MG/DL (ref 8.3–10.4)
CHLORIDE SERPL-SCNC: 105 MMOL/L (ref 101–110)
CO2 SERPL-SCNC: 30 MMOL/L (ref 21–32)
CREAT SERPL-MCNC: 0.8 MG/DL (ref 0.6–1)
DIFFERENTIAL METHOD BLD: ABNORMAL
EKG ATRIAL RATE: 84 BPM
EKG DIAGNOSIS: NORMAL
EKG P AXIS: 40 DEGREES
EKG P-R INTERVAL: 148 MS
EKG Q-T INTERVAL: 372 MS
EKG QRS DURATION: 82 MS
EKG QTC CALCULATION (BAZETT): 439 MS
EKG R AXIS: -2 DEGREES
EKG T AXIS: 59 DEGREES
EKG VENTRICULAR RATE: 84 BPM
EOSINOPHIL # BLD: 0.2 K/UL (ref 0–0.8)
EOSINOPHIL NFR BLD: 3 % (ref 0.5–7.8)
ERYTHROCYTE [DISTWIDTH] IN BLOOD BY AUTOMATED COUNT: 13.2 % (ref 11.9–14.6)
GLUCOSE SERPL-MCNC: 103 MG/DL (ref 65–100)
HCT VFR BLD AUTO: 35 % (ref 35.8–46.3)
HGB BLD-MCNC: 11.6 G/DL (ref 11.7–15.4)
IMM GRANULOCYTES # BLD AUTO: 0 K/UL (ref 0–0.5)
IMM GRANULOCYTES NFR BLD AUTO: 0 % (ref 0–5)
LYMPHOCYTES # BLD: 1.8 K/UL (ref 0.5–4.6)
LYMPHOCYTES NFR BLD: 23 % (ref 13–44)
MAGNESIUM SERPL-MCNC: 2.1 MG/DL (ref 1.8–2.4)
MCH RBC QN AUTO: 31 PG (ref 26.1–32.9)
MCHC RBC AUTO-ENTMCNC: 33.1 G/DL (ref 31.4–35)
MCV RBC AUTO: 93.6 FL (ref 82–102)
MM INDURATION, POC: 0 MM (ref 0–5)
MONOCYTES # BLD: 0.9 K/UL (ref 0.1–1.3)
MONOCYTES NFR BLD: 11 % (ref 4–12)
NEUTS SEG # BLD: 4.8 K/UL (ref 1.7–8.2)
NEUTS SEG NFR BLD: 62 % (ref 43–78)
NRBC # BLD: 0 K/UL (ref 0–0.2)
PHOSPHATE SERPL-MCNC: 3.5 MG/DL (ref 2.3–3.7)
PLATELET # BLD AUTO: 217 K/UL (ref 150–450)
PMV BLD AUTO: 11.1 FL (ref 9.4–12.3)
POTASSIUM SERPL-SCNC: 3.2 MMOL/L (ref 3.5–5.1)
PPD, POC: NEGATIVE
RBC # BLD AUTO: 3.74 M/UL (ref 4.05–5.2)
SODIUM SERPL-SCNC: 140 MMOL/L (ref 133–143)
WBC # BLD AUTO: 7.9 K/UL (ref 4.3–11.1)

## 2023-10-27 PROCEDURE — 2500000003 HC RX 250 WO HCPCS: Performed by: NURSE PRACTITIONER

## 2023-10-27 PROCEDURE — A4216 STERILE WATER/SALINE, 10 ML: HCPCS | Performed by: INTERNAL MEDICINE

## 2023-10-27 PROCEDURE — 84100 ASSAY OF PHOSPHORUS: CPT

## 2023-10-27 PROCEDURE — 6370000000 HC RX 637 (ALT 250 FOR IP): Performed by: INTERNAL MEDICINE

## 2023-10-27 PROCEDURE — 83735 ASSAY OF MAGNESIUM: CPT

## 2023-10-27 PROCEDURE — 97165 OT EVAL LOW COMPLEX 30 MIN: CPT

## 2023-10-27 PROCEDURE — 97530 THERAPEUTIC ACTIVITIES: CPT

## 2023-10-27 PROCEDURE — 97112 NEUROMUSCULAR REEDUCATION: CPT

## 2023-10-27 PROCEDURE — 2580000003 HC RX 258: Performed by: INTERNAL MEDICINE

## 2023-10-27 PROCEDURE — 2140000000 HC CCU INTERMEDIATE R&B

## 2023-10-27 PROCEDURE — 85025 COMPLETE CBC W/AUTO DIFF WBC: CPT

## 2023-10-27 PROCEDURE — 80048 BASIC METABOLIC PNL TOTAL CA: CPT

## 2023-10-27 PROCEDURE — 6360000002 HC RX W HCPCS: Performed by: INTERNAL MEDICINE

## 2023-10-27 PROCEDURE — 97535 SELF CARE MNGMENT TRAINING: CPT

## 2023-10-27 PROCEDURE — 2500000003 HC RX 250 WO HCPCS: Performed by: INTERNAL MEDICINE

## 2023-10-27 PROCEDURE — 36415 COLL VENOUS BLD VENIPUNCTURE: CPT

## 2023-10-27 PROCEDURE — 92526 ORAL FUNCTION THERAPY: CPT

## 2023-10-27 PROCEDURE — 93010 ELECTROCARDIOGRAM REPORT: CPT | Performed by: INTERNAL MEDICINE

## 2023-10-27 PROCEDURE — 93005 ELECTROCARDIOGRAM TRACING: CPT | Performed by: INTERNAL MEDICINE

## 2023-10-27 RX ORDER — POTASSIUM CHLORIDE 20 MEQ/1
40 TABLET, EXTENDED RELEASE ORAL ONCE
Status: COMPLETED | OUTPATIENT
Start: 2023-10-27 | End: 2023-10-27

## 2023-10-27 RX ORDER — SENNOSIDES A AND B 8.6 MG/1
1 TABLET, FILM COATED ORAL NIGHTLY
Status: DISCONTINUED | OUTPATIENT
Start: 2023-10-27 | End: 2023-10-30 | Stop reason: HOSPADM

## 2023-10-27 RX ORDER — FAMOTIDINE 20 MG/1
20 TABLET, FILM COATED ORAL DAILY
Status: DISCONTINUED | OUTPATIENT
Start: 2023-10-28 | End: 2023-10-30 | Stop reason: HOSPADM

## 2023-10-27 RX ORDER — POLYETHYLENE GLYCOL 3350 17 G/17G
17 POWDER, FOR SOLUTION ORAL DAILY
Status: DISCONTINUED | OUTPATIENT
Start: 2023-10-28 | End: 2023-10-30 | Stop reason: HOSPADM

## 2023-10-27 RX ADMIN — FAMOTIDINE 20 MG: 10 INJECTION, SOLUTION INTRAVENOUS at 08:19

## 2023-10-27 RX ADMIN — POTASSIUM CHLORIDE 40 MEQ: 1500 TABLET, EXTENDED RELEASE ORAL at 08:18

## 2023-10-27 RX ADMIN — ACETAMINOPHEN 650 MG: 325 TABLET ORAL at 00:44

## 2023-10-27 RX ADMIN — Medication 100 MG: at 08:17

## 2023-10-27 RX ADMIN — GUAIFENESIN 200 MG: 200 SOLUTION ORAL at 06:20

## 2023-10-27 RX ADMIN — SODIUM CHLORIDE, PRESERVATIVE FREE 10 ML: 5 INJECTION INTRAVENOUS at 20:25

## 2023-10-27 RX ADMIN — GUAIFENESIN 200 MG: 200 SOLUTION ORAL at 20:24

## 2023-10-27 RX ADMIN — POTASSIUM CHLORIDE 10 MEQ: 7.46 INJECTION, SOLUTION INTRAVENOUS at 06:19

## 2023-10-27 RX ADMIN — OLANZAPINE 2.5 MG: 5 TABLET, FILM COATED ORAL at 20:24

## 2023-10-27 RX ADMIN — ENOXAPARIN SODIUM 40 MG: 100 INJECTION SUBCUTANEOUS at 08:18

## 2023-10-27 RX ADMIN — SODIUM CHLORIDE, PRESERVATIVE FREE 10 ML: 5 INJECTION INTRAVENOUS at 08:19

## 2023-10-27 RX ADMIN — POLYETHYLENE GLYCOL 3350 17 G: 17 POWDER, FOR SOLUTION ORAL at 08:18

## 2023-10-27 RX ADMIN — SENNOSIDES 8.6 MG: 8.6 TABLET, FILM COATED ORAL at 20:24

## 2023-10-27 RX ADMIN — GUAIFENESIN 200 MG: 200 SOLUTION ORAL at 14:51

## 2023-10-27 ASSESSMENT — PAIN - FUNCTIONAL ASSESSMENT: PAIN_FUNCTIONAL_ASSESSMENT: PREVENTS OR INTERFERES SOME ACTIVE ACTIVITIES AND ADLS

## 2023-10-27 ASSESSMENT — PAIN SCALES - GENERAL
PAINLEVEL_OUTOF10: 3
PAINLEVEL_OUTOF10: 0

## 2023-10-27 ASSESSMENT — PAIN DESCRIPTION - LOCATION: LOCATION: BACK

## 2023-10-27 ASSESSMENT — PAIN DESCRIPTION - ORIENTATION: ORIENTATION: LOWER

## 2023-10-27 ASSESSMENT — PAIN DESCRIPTION - DESCRIPTORS: DESCRIPTORS: ACHING

## 2023-10-28 LAB
ANION GAP SERPL CALC-SCNC: 5 MMOL/L (ref 2–11)
BUN SERPL-MCNC: 11 MG/DL (ref 8–23)
CALCIUM SERPL-MCNC: 9.1 MG/DL (ref 8.3–10.4)
CHLORIDE SERPL-SCNC: 103 MMOL/L (ref 101–110)
CO2 SERPL-SCNC: 29 MMOL/L (ref 21–32)
CREAT SERPL-MCNC: 0.9 MG/DL (ref 0.6–1)
GLUCOSE SERPL-MCNC: 118 MG/DL (ref 65–100)
MAGNESIUM SERPL-MCNC: 2.3 MG/DL (ref 1.8–2.4)
MM INDURATION, POC: 0 MM (ref 0–5)
PHOSPHATE SERPL-MCNC: 3.7 MG/DL (ref 2.3–3.7)
POTASSIUM SERPL-SCNC: 3.7 MMOL/L (ref 3.5–5.1)
PPD, POC: NEGATIVE
SODIUM SERPL-SCNC: 137 MMOL/L (ref 133–143)

## 2023-10-28 PROCEDURE — 80048 BASIC METABOLIC PNL TOTAL CA: CPT

## 2023-10-28 PROCEDURE — 2580000003 HC RX 258: Performed by: INTERNAL MEDICINE

## 2023-10-28 PROCEDURE — 6370000000 HC RX 637 (ALT 250 FOR IP): Performed by: INTERNAL MEDICINE

## 2023-10-28 PROCEDURE — 36415 COLL VENOUS BLD VENIPUNCTURE: CPT

## 2023-10-28 PROCEDURE — 2140000000 HC CCU INTERMEDIATE R&B

## 2023-10-28 PROCEDURE — 6360000002 HC RX W HCPCS: Performed by: INTERNAL MEDICINE

## 2023-10-28 PROCEDURE — 83735 ASSAY OF MAGNESIUM: CPT

## 2023-10-28 PROCEDURE — 84100 ASSAY OF PHOSPHORUS: CPT

## 2023-10-28 PROCEDURE — 2500000003 HC RX 250 WO HCPCS: Performed by: NURSE PRACTITIONER

## 2023-10-28 RX ORDER — LANOLIN ALCOHOL/MO/W.PET/CERES
3 CREAM (GRAM) TOPICAL NIGHTLY PRN
Status: DISCONTINUED | OUTPATIENT
Start: 2023-10-28 | End: 2023-10-30 | Stop reason: HOSPADM

## 2023-10-28 RX ORDER — VENLAFAXINE HYDROCHLORIDE 37.5 MG/1
75 CAPSULE, EXTENDED RELEASE ORAL
Status: DISCONTINUED | OUTPATIENT
Start: 2023-10-28 | End: 2023-10-30 | Stop reason: HOSPADM

## 2023-10-28 RX ADMIN — LORAZEPAM 0.5 MG: 0.5 TABLET ORAL at 03:24

## 2023-10-28 RX ADMIN — LORAZEPAM 0.5 MG: 0.5 TABLET ORAL at 21:22

## 2023-10-28 RX ADMIN — SODIUM CHLORIDE, PRESERVATIVE FREE 10 ML: 5 INJECTION INTRAVENOUS at 21:28

## 2023-10-28 RX ADMIN — FAMOTIDINE 20 MG: 20 TABLET, FILM COATED ORAL at 08:58

## 2023-10-28 RX ADMIN — SODIUM CHLORIDE, PRESERVATIVE FREE 10 ML: 5 INJECTION INTRAVENOUS at 08:59

## 2023-10-28 RX ADMIN — POLYETHYLENE GLYCOL 3350 17 G: 17 POWDER, FOR SOLUTION ORAL at 08:59

## 2023-10-28 RX ADMIN — ACETAMINOPHEN 650 MG: 325 TABLET ORAL at 03:23

## 2023-10-28 RX ADMIN — Medication 100 MG: at 08:58

## 2023-10-28 RX ADMIN — ENOXAPARIN SODIUM 40 MG: 100 INJECTION SUBCUTANEOUS at 08:58

## 2023-10-28 RX ADMIN — OLANZAPINE 2.5 MG: 5 TABLET, FILM COATED ORAL at 21:22

## 2023-10-28 RX ADMIN — GUAIFENESIN 200 MG: 200 SOLUTION ORAL at 23:22

## 2023-10-28 RX ADMIN — ACETAMINOPHEN 650 MG: 325 TABLET ORAL at 21:23

## 2023-10-28 RX ADMIN — VENLAFAXINE HYDROCHLORIDE 75 MG: 37.5 CAPSULE, EXTENDED RELEASE ORAL at 10:32

## 2023-10-28 RX ADMIN — SENNOSIDES 8.6 MG: 8.6 TABLET, FILM COATED ORAL at 21:23

## 2023-10-28 ASSESSMENT — PAIN SCALES - GENERAL
PAINLEVEL_OUTOF10: 0
PAINLEVEL_OUTOF10: 3
PAINLEVEL_OUTOF10: 4

## 2023-10-28 ASSESSMENT — PAIN DESCRIPTION - DESCRIPTORS
DESCRIPTORS: ACHING
DESCRIPTORS: ACHING

## 2023-10-28 ASSESSMENT — PAIN DESCRIPTION - ORIENTATION
ORIENTATION: MID;POSTERIOR
ORIENTATION: MID;POSTERIOR

## 2023-10-28 ASSESSMENT — PAIN - FUNCTIONAL ASSESSMENT: PAIN_FUNCTIONAL_ASSESSMENT: PREVENTS OR INTERFERES SOME ACTIVE ACTIVITIES AND ADLS

## 2023-10-28 ASSESSMENT — PAIN DESCRIPTION - LOCATION
LOCATION: GENERALIZED
LOCATION: GENERALIZED

## 2023-10-28 ASSESSMENT — PAIN SCALES - WONG BAKER
WONGBAKER_NUMERICALRESPONSE: 0
WONGBAKER_NUMERICALRESPONSE: 0

## 2023-10-29 LAB
ANION GAP SERPL CALC-SCNC: 7 MMOL/L (ref 2–11)
BUN SERPL-MCNC: 13 MG/DL (ref 8–23)
CALCIUM SERPL-MCNC: 9.2 MG/DL (ref 8.3–10.4)
CHLORIDE SERPL-SCNC: 105 MMOL/L (ref 101–110)
CO2 SERPL-SCNC: 28 MMOL/L (ref 21–32)
CREAT SERPL-MCNC: 0.8 MG/DL (ref 0.6–1)
GLUCOSE SERPL-MCNC: 127 MG/DL (ref 65–100)
MAGNESIUM SERPL-MCNC: 2.2 MG/DL (ref 1.8–2.4)
MM INDURATION, POC: 0 MM (ref 0–5)
PHOSPHATE SERPL-MCNC: 3.6 MG/DL (ref 2.3–3.7)
POTASSIUM SERPL-SCNC: 3.9 MMOL/L (ref 3.5–5.1)
PPD, POC: NEGATIVE
SODIUM SERPL-SCNC: 140 MMOL/L (ref 133–143)

## 2023-10-29 PROCEDURE — 2140000000 HC CCU INTERMEDIATE R&B

## 2023-10-29 PROCEDURE — 6360000002 HC RX W HCPCS: Performed by: INTERNAL MEDICINE

## 2023-10-29 PROCEDURE — 80048 BASIC METABOLIC PNL TOTAL CA: CPT

## 2023-10-29 PROCEDURE — 83735 ASSAY OF MAGNESIUM: CPT

## 2023-10-29 PROCEDURE — 84100 ASSAY OF PHOSPHORUS: CPT

## 2023-10-29 PROCEDURE — 6370000000 HC RX 637 (ALT 250 FOR IP): Performed by: INTERNAL MEDICINE

## 2023-10-29 PROCEDURE — 2500000003 HC RX 250 WO HCPCS: Performed by: NURSE PRACTITIONER

## 2023-10-29 PROCEDURE — 36415 COLL VENOUS BLD VENIPUNCTURE: CPT

## 2023-10-29 PROCEDURE — 2580000003 HC RX 258: Performed by: INTERNAL MEDICINE

## 2023-10-29 RX ORDER — LABETALOL HYDROCHLORIDE 5 MG/ML
10 INJECTION, SOLUTION INTRAVENOUS ONCE
Status: COMPLETED | OUTPATIENT
Start: 2023-10-29 | End: 2023-10-29

## 2023-10-29 RX ORDER — HYDRALAZINE HYDROCHLORIDE 20 MG/ML
10 INJECTION INTRAMUSCULAR; INTRAVENOUS ONCE
Status: COMPLETED | OUTPATIENT
Start: 2023-10-29 | End: 2023-10-29

## 2023-10-29 RX ORDER — AMLODIPINE BESYLATE 5 MG/1
5 TABLET ORAL DAILY
Status: DISCONTINUED | OUTPATIENT
Start: 2023-10-29 | End: 2023-10-30 | Stop reason: HOSPADM

## 2023-10-29 RX ORDER — DIMETHICONE, CAMPHOR (SYNTHETIC), MENTHOL, AND PHENOL 1.1; .5; .625; .5 G/100G; G/100G; G/100G; G/100G
OINTMENT TOPICAL PRN
Status: DISCONTINUED | OUTPATIENT
Start: 2023-10-29 | End: 2023-10-30 | Stop reason: HOSPADM

## 2023-10-29 RX ADMIN — Medication 3 MG: at 01:28

## 2023-10-29 RX ADMIN — SODIUM CHLORIDE, PRESERVATIVE FREE 5 ML: 5 INJECTION INTRAVENOUS at 21:10

## 2023-10-29 RX ADMIN — GUAIFENESIN 200 MG: 200 SOLUTION ORAL at 15:21

## 2023-10-29 RX ADMIN — SENNOSIDES 8.6 MG: 8.6 TABLET, FILM COATED ORAL at 21:09

## 2023-10-29 RX ADMIN — OLANZAPINE 2.5 MG: 5 TABLET, FILM COATED ORAL at 21:09

## 2023-10-29 RX ADMIN — SODIUM CHLORIDE, PRESERVATIVE FREE 10 ML: 5 INJECTION INTRAVENOUS at 08:44

## 2023-10-29 RX ADMIN — LABETALOL HYDROCHLORIDE 10 MG: 5 INJECTION INTRAVENOUS at 16:15

## 2023-10-29 RX ADMIN — ENOXAPARIN SODIUM 40 MG: 100 INJECTION SUBCUTANEOUS at 08:48

## 2023-10-29 RX ADMIN — AMLODIPINE BESYLATE 5 MG: 5 TABLET ORAL at 08:52

## 2023-10-29 RX ADMIN — VENLAFAXINE HYDROCHLORIDE 75 MG: 37.5 CAPSULE, EXTENDED RELEASE ORAL at 08:42

## 2023-10-29 RX ADMIN — GUAIFENESIN 200 MG: 200 SOLUTION ORAL at 23:17

## 2023-10-29 RX ADMIN — LORAZEPAM 0.5 MG: 0.5 TABLET ORAL at 21:09

## 2023-10-29 RX ADMIN — HYDRALAZINE HYDROCHLORIDE 10 MG: 20 INJECTION, SOLUTION INTRAMUSCULAR; INTRAVENOUS at 14:49

## 2023-10-29 RX ADMIN — FAMOTIDINE 20 MG: 20 TABLET, FILM COATED ORAL at 08:43

## 2023-10-29 RX ADMIN — LORAZEPAM 0.5 MG: 0.5 TABLET ORAL at 11:30

## 2023-10-29 ASSESSMENT — PAIN SCALES - GENERAL: PAINLEVEL_OUTOF10: 0

## 2023-10-30 VITALS
DIASTOLIC BLOOD PRESSURE: 83 MMHG | WEIGHT: 149.9 LBS | BODY MASS INDEX: 27.58 KG/M2 | TEMPERATURE: 98.7 F | OXYGEN SATURATION: 99 % | HEART RATE: 115 BPM | HEIGHT: 62 IN | SYSTOLIC BLOOD PRESSURE: 158 MMHG | RESPIRATION RATE: 18 BRPM

## 2023-10-30 LAB
ANION GAP SERPL CALC-SCNC: 9 MMOL/L (ref 2–11)
BUN SERPL-MCNC: 15 MG/DL (ref 8–23)
CALCIUM SERPL-MCNC: 9.4 MG/DL (ref 8.3–10.4)
CHLORIDE SERPL-SCNC: 103 MMOL/L (ref 101–110)
CO2 SERPL-SCNC: 28 MMOL/L (ref 21–32)
CREAT SERPL-MCNC: 0.9 MG/DL (ref 0.6–1)
GLUCOSE SERPL-MCNC: 135 MG/DL (ref 65–100)
MAGNESIUM SERPL-MCNC: 2.2 MG/DL (ref 1.8–2.4)
PHOSPHATE SERPL-MCNC: 3.3 MG/DL (ref 2.3–3.7)
POTASSIUM SERPL-SCNC: 3.6 MMOL/L (ref 3.5–5.1)
SARS-COV-2 RDRP RESP QL NAA+PROBE: NOT DETECTED
SODIUM SERPL-SCNC: 140 MMOL/L (ref 133–143)
SOURCE: NORMAL

## 2023-10-30 PROCEDURE — 80048 BASIC METABOLIC PNL TOTAL CA: CPT

## 2023-10-30 PROCEDURE — 6370000000 HC RX 637 (ALT 250 FOR IP): Performed by: INTERNAL MEDICINE

## 2023-10-30 PROCEDURE — 6360000002 HC RX W HCPCS: Performed by: INTERNAL MEDICINE

## 2023-10-30 PROCEDURE — 83735 ASSAY OF MAGNESIUM: CPT

## 2023-10-30 PROCEDURE — 87635 SARS-COV-2 COVID-19 AMP PRB: CPT

## 2023-10-30 PROCEDURE — 97535 SELF CARE MNGMENT TRAINING: CPT

## 2023-10-30 PROCEDURE — 84100 ASSAY OF PHOSPHORUS: CPT

## 2023-10-30 PROCEDURE — 2580000003 HC RX 258: Performed by: INTERNAL MEDICINE

## 2023-10-30 PROCEDURE — 36415 COLL VENOUS BLD VENIPUNCTURE: CPT

## 2023-10-30 PROCEDURE — 97530 THERAPEUTIC ACTIVITIES: CPT

## 2023-10-30 PROCEDURE — 97112 NEUROMUSCULAR REEDUCATION: CPT

## 2023-10-30 RX ORDER — AMLODIPINE BESYLATE 5 MG/1
5 TABLET ORAL DAILY
Qty: 30 TABLET | Refills: 3
Start: 2023-10-31

## 2023-10-30 RX ORDER — ZOLPIDEM TARTRATE 5 MG/1
5 TABLET ORAL NIGHTLY PRN
Status: DISCONTINUED | OUTPATIENT
Start: 2023-10-30 | End: 2023-10-30 | Stop reason: HOSPADM

## 2023-10-30 RX ADMIN — SODIUM CHLORIDE, PRESERVATIVE FREE 10 ML: 5 INJECTION INTRAVENOUS at 08:33

## 2023-10-30 RX ADMIN — VENLAFAXINE HYDROCHLORIDE 75 MG: 37.5 CAPSULE, EXTENDED RELEASE ORAL at 08:27

## 2023-10-30 RX ADMIN — Medication 3 MG: at 01:11

## 2023-10-30 RX ADMIN — AMLODIPINE BESYLATE 5 MG: 5 TABLET ORAL at 08:27

## 2023-10-30 RX ADMIN — ENOXAPARIN SODIUM 40 MG: 100 INJECTION SUBCUTANEOUS at 08:27

## 2023-10-30 RX ADMIN — FAMOTIDINE 20 MG: 20 TABLET, FILM COATED ORAL at 08:27

## 2023-10-30 RX ADMIN — POLYETHYLENE GLYCOL 3350 17 G: 17 POWDER, FOR SOLUTION ORAL at 08:27

## 2023-10-30 NOTE — CARE COORDINATION
Chart reviewed and pt discussed in am IDR. Continues ICU but with tele tx orders now. No gtts, RA. RN states pt had tele psych, but not able to complete due to pt. Awaiting in person eval per staff. CM will need to follow for any assist and d/c needs/POC. D/C POC pending at present, poss psych facility vs STR. PPD for potential rehab needs. PT/OT per MD. LOS 4 days.
Chart reviewed and pt discussed in am IDR. Continues ICU, intubated/vent-25% PS trials per RT. Hopefully for poss extubation today per MD. Precedex gtt. Pt will still poss need tele/psych once stable per MD. CM following. D/C POC still pending. LOS 3 days.
Pt is now discharging. Pt was accepted at [facility] at their [address] Call for report: [phone]. Dr. Lisa Thurman is the accepting physician at [facility]. [employer] arranged for 1600. COVID placed. Original IVC papers and EMTALA form placed in the pts transport packet. Provided pt the address of the receiving hospital in order to update her spouse. No other discharge needs identified. Pt is medically stable to transfer. Tx goals met. 10/30/23 [address] [facility] Discharge   Transition of Care Consult (CM Consult) Discharge Planning;Transportation Assistance; Other  ([address] [place] [address])   [address] Discharge Transport   [employer] Information Provided? No   [facility] of [facility] at Discharge [address] [address] Time of Discharge [address]   Confirm Follow Up Transport Family   Condition of Participation: Discharge Planning   The Patient and/or Patient Representative was provided with a Choice of Provider? Patient   The Patient and/Or Patient Representative agree with the Discharge Plan? Yes   Freedom of Choice list was provided with basic dialogue that supports the patient's individualized plan of care/goals, treatment preferences, and shares the quality data associated with the providers?   Yes
Pt placed on commitment papers per MD this day. Placed on front of chart. Will need page one redone in 72 hours if not d/c before. Pt and family aware per Michela Marquez, NP, Psych. Pt considered high risk per NP and will need placement when medically stable. Recommends trying [facility], [facility], [address], [address]. CM following for when medically stable.
Independent in ADLs/IADLs  Current functional level: (P) Assistance with the following:    PT AM-PAC:     OT AM-PAC:       Family can provide assistance at DC: (P) Yes  Would you like Case Management to discuss the discharge plan with any other family members/significant others, and if so, who? (P) Yes  Plans to Return to Present Housing: (P) Unknown at present  Other Identified Issues/Barriers to RETURNING to current housing: pending  Potential Assistance needed at discharge: (P) Other (Comment), [address], [address] [place] [address] (pending psych possibly)            Potential DME:  pending  Patient expects to discharge to: [address] [place] [address] for transportation at discharge: (P) Family    Financial    Payor: [address] / Plan: MEDICARE PART A AND B / Product Type: *No Product type* /     Does insurance require precert for SNF: No    Potential assistance Purchasing Medications: (P) No  Meds-to-Beds request: n/a       [id number], [address] [place] [address] [facility] [place]  [address] [facility]  [address] [facility] [address]  Phone: [phone] Fax: [phone]      Notes:    Factors facilitating achievement of predicted outcomes: Family support    Barriers to discharge:pending medical treatment and needs    Additional Case Management Notes: Pt chart reviewed and pt discussed in am IDR. Pt seen and spoke with spouse at bedside. Currently intubated/vent. Spouse confirms demographics/screen. Pt lives with spouse. They have been  for almost 60 years. They have 4 adult children, one is . Two live local, one is out of state. Independent of ADL's. No current DME's for ambulation, HH, or rehab. Spouse states pt has dealt with depression for a while. Poss d/c needs/POC discussed pending recovery and when off vent. Verbalizes understanding. Discussed medical, physical, and then mental recovery and poss needs. CM to follow.

## 2023-10-30 NOTE — DISCHARGE SUMMARY
Hospitalist Discharge Summary   Admit Date:  10/22/2023  1:15 PM   DC Note date: 10/30/2023  Name:  Kurt Constable   Age:  69 y.o. Sex:  female  :  1945   MRN:  [id number]   Room:  [facility]  PCP:  Blayne Garcia MD    Presenting Complaint: Drug Overdose     Initial Admission Diagnosis: Intentional drug overdose, initial encounter [facility]) [T50.902A]  Intentional benzodiazepine overdose, initial encounter ([address]) [T42.4X2A]  Polysubstance overdose, accidental or unintentional, initial encounter [T50.901A]  Antidepressant overdose, intentional self-harm, initial encounter ([address]) [T43.202A]  [place]ston coma scale total score 3-8, at arrival to emergency department 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)  Resolved Problems:    * No resolved hospital problems. *      Hospital Course:  69 y.o. female with medical history of breast cancer, depression admitted with intentional antidepressant overdose. 1.  Intentional drug overdose / Depression  Sitter at bedside  Psych consulted  Placed IVC papers  Suicide precautions  Restarted on effexor  Hemodynamically stable on discharge       Disposition:  Psych unit  Diet: ADULT ORAL NUTRITION SUPPLEMENT; Breakfast, Lunch, Dinner; Standard High Calorie/High Protein Oral Supplement  ADULT DIET; Regular; Safety Tray; Safety Tray (Disposables)  Code Status: Full Code    Follow Ups:      Time spent in patient discharge and coordination 31 minutes.         Follow up labs/diagnostics (ultimately defer to outpatient

## 2023-10-30 NOTE — PLAN OF CARE
Problem: Discharge Planning  Goal: Discharge to home or other facility with appropriate resources  Outcome: Adequate for Discharge     Problem: Safety - Medical Restraint  Goal: Remains free of injury from restraints (Restraint for Interference with Medical Device)  Description: INTERVENTIONS:  1. Determine that other, less restrictive measures have been tried or would not be effective before applying the restraint  2. Evaluate the patient's condition at the time of restraint application  3. Inform patient/family regarding the reason for restraint  4. Q2H: Monitor safety, psychosocial status, comfort, nutrition and hydration  Outcome: Adequate for Discharge     Problem: Confusion  Goal: Confusion, delirium, dementia, or psychosis is improved or at baseline  Description: INTERVENTIONS:  1. Assess for possible contributors to thought disturbance, including medications, impaired vision or hearing, underlying metabolic abnormalities, dehydration, psychiatric diagnoses, and notify attending LIP  2. [place] high risk fall precautions, as indicated  3. Provide frequent short contacts to provide reality reorientation, refocusing and direction  4. Decrease environmental stimuli, including noise as appropriate  5. Monitor and intervene to maintain adequate nutrition, hydration, elimination, sleep and activity  6. If unable to ensure safety without constant attention obtain sitter and review sitter guidelines with assigned personnel  7. Initiate Psychosocial CNS and Spiritual Care consult, as indicated  Outcome: Adequate for Discharge     Problem: Skin/Tissue Integrity  Goal: Absence of new skin breakdown  Description: 1. Monitor for areas of redness and/or skin breakdown  2. Assess vascular access sites hourly  3. Every 4-6 hours minimum:  Change oxygen saturation probe site  4.   Every 4-6 hours:  If on nasal continuous positive airway pressure, respiratory therapy assess nares and determine need for appliance change

## 2023-11-02 ENCOUNTER — TELEPHONE (OUTPATIENT)
Dept: INTERNAL MEDICINE CLINIC | Facility: CLINIC | Age: 78
End: 2023-11-02

## 2023-11-02 DIAGNOSIS — F41.0 PANIC DISORDER: Primary | ICD-10-CM

## 2023-11-02 RX ORDER — LORAZEPAM 1 MG/1
0.5 TABLET ORAL 2 TIMES DAILY
Qty: 30 TABLET | Refills: 5 | Status: SHIPPED | OUTPATIENT
Start: 2023-11-02 | End: 2024-04-30

## 2023-11-02 NOTE — TELEPHONE ENCOUNTER
Please call and clarify what exactly this is about. Go through med list with daughter. Thanks.   [address]

## 2023-11-02 NOTE — TELEPHONE ENCOUNTER
Patient daughter called stated her mom tried to kill herself she has been in the hospital for 10 days and she is being discharged today, they are getting her off the ativan she is addicted to and starting her on a  5 day supply of the 0.5mg two times a day with anti seizure medication. But she is wanting to know about the refills after the 5 days and they are not happy with her psychiatrist so they are wanting to know what to do about that?     [facility] [place]   Daughter is on the [place]

## 2023-11-02 NOTE — TELEPHONE ENCOUNTER
D/C DATE: 11/2/2023    D/C FROM: [facility]     D/C TO: Home    PHONE NUMBER TO REACH PATIENT: [phone]    F/U APPT DATE & TIME: no appointments available

## 2023-11-02 NOTE — TELEPHONE ENCOUNTER
Spoke with pt's daughter Bruna Cazares - she now has power  and is handling her mothers care and medication. Pt has been weaned off all her medication except Ativan 0.5 mg bid - she will be getting a 5 day supply from the hospital and will be due for an Ativan 1 mg - 1/2 tab bid on 11/6/2023 -     Jessica [facility] a [facility]    Jessica will keep the medication at her house and will go by daily to give this to her mother.      They want to discuss a new referral for a psychiatrist, they are un happy with her current drAretha (Will discuss at her hospital f/u.)

## 2023-11-08 ENCOUNTER — OFFICE VISIT (OUTPATIENT)
Dept: INTERNAL MEDICINE CLINIC | Facility: CLINIC | Age: 78
End: 2023-11-08

## 2023-11-08 VITALS
HEART RATE: 100 BPM | HEIGHT: 62 IN | WEIGHT: 146.6 LBS | SYSTOLIC BLOOD PRESSURE: 142 MMHG | RESPIRATION RATE: 16 BRPM | BODY MASS INDEX: 26.98 KG/M2 | DIASTOLIC BLOOD PRESSURE: 83 MMHG | TEMPERATURE: 98.6 F

## 2023-11-08 DIAGNOSIS — I10 ESSENTIAL HYPERTENSION, BENIGN: ICD-10-CM

## 2023-11-08 DIAGNOSIS — K59.00 CONSTIPATION, UNSPECIFIED CONSTIPATION TYPE: ICD-10-CM

## 2023-11-08 DIAGNOSIS — Z09 HOSPITAL DISCHARGE FOLLOW-UP: Primary | ICD-10-CM

## 2023-11-08 DIAGNOSIS — F41.9 ANXIETY: ICD-10-CM

## 2023-11-08 DIAGNOSIS — F41.0 PANIC DISORDER: ICD-10-CM

## 2023-11-08 LAB
ALBUMIN SERPL-MCNC: 3.4 G/DL (ref 3.2–4.6)
ALBUMIN/GLOB SERPL: 1.1 (ref 0.4–1.6)
ALP SERPL-CCNC: 73 U/L (ref 50–136)
ALT SERPL-CCNC: 30 U/L (ref 12–65)
ANION GAP SERPL CALC-SCNC: 7 MMOL/L (ref 2–11)
AST SERPL-CCNC: 23 U/L (ref 15–37)
BILIRUB SERPL-MCNC: 0.4 MG/DL (ref 0.2–1.1)
BUN SERPL-MCNC: 13 MG/DL (ref 8–23)
CALCIUM SERPL-MCNC: 9.4 MG/DL (ref 8.3–10.4)
CHLORIDE SERPL-SCNC: 106 MMOL/L (ref 101–110)
CO2 SERPL-SCNC: 28 MMOL/L (ref 21–32)
CREAT SERPL-MCNC: 1.4 MG/DL (ref 0.6–1)
GLOBULIN SER CALC-MCNC: 3.1 G/DL (ref 2.8–4.5)
GLUCOSE SERPL-MCNC: 129 MG/DL (ref 65–100)
MAGNESIUM SERPL-MCNC: 2.1 MG/DL (ref 1.8–2.4)
POTASSIUM SERPL-SCNC: 4.1 MMOL/L (ref 3.5–5.1)
PROT SERPL-MCNC: 6.5 G/DL (ref 6.3–8.2)
SODIUM SERPL-SCNC: 141 MMOL/L (ref 133–143)
TSH W FREE THYROID IF ABNORMAL: 1.61 UIU/ML (ref 0.36–3.74)

## 2023-11-08 RX ORDER — LEVETIRACETAM 500 MG/1
500 TABLET ORAL 2 TIMES DAILY
COMMUNITY
Start: 2023-11-02

## 2023-11-08 RX ORDER — LORAZEPAM 0.5 MG/1
0.5 TABLET ORAL EVERY 12 HOURS
Qty: 60 TABLET | Refills: 5 | Status: SHIPPED | OUTPATIENT
Start: 2023-11-08 | End: 2024-05-06

## 2023-11-08 RX ORDER — LORAZEPAM 0.5 MG/1
TABLET ORAL
COMMUNITY
Start: 2023-11-02 | End: 2023-11-08

## 2023-11-08 ASSESSMENT — ENCOUNTER SYMPTOMS
BLOOD IN STOOL: 0
NAUSEA: 0
VOMITING: 0
CONSTIPATION: 1
COUGH: 0
SHORTNESS OF BREATH: 0
WHEEZING: 0
DIARRHEA: 0

## 2023-11-08 NOTE — PROGRESS NOTES
11/8/2023 10:00 PM  Location:[address] [place]NAL MEDICINE  [place]  Patient #:  [id number]  YOB: 1945            History of Present Illness     Chief Complaint   Patient presents with    Follow-Up from [address]: [facility] Follow-up- Discharged 11/2/2023    Suicidal     Overdosed on all her medications;  found her about 12 hours later- also had a seizure and hit her head and ended up back in the hospital. Daughter would like to see where they need to go from here, don't know if she need a referral to another psychiatrist. Daughter don't want her to go back to Dr. Smith [facility]       Ms. Toni Cordova is a 69 y.o. female  who presents for the above. Patient was hospitalized after intentional overdose. Has been weaned off of her meds except for low dosage of ativan. Is not sleeping well.            Allergies   Allergen Reactions    Penicillins Other (See Comments)     unknown     Past Medical History:   Diagnosis Date    Abdominal pain, right upper quadrant     Abnormal weight gain     Acute sinusitis, unspecified     LALY (acute kidney injury) ([address] [facility]) 10/22/2023    Benign neoplasm of colon 2/25/2015    Benign paroxysmal positional vertigo 2/25/2015    Breast cancer ([address] [facility]) 05/2017    left    Calculus of gallbladder without mention of cholecystitis or obstruction 2/25/2015    Depressive disorder, not elsewhere classified 2/25/2015    Dysuria     Essential hypertension, benign 2/25/2015    Insomnia, unspecified 2/25/2015    Nausea alone     Neoplasm of uncertain behavior of skin     Other malaise and fatigue     Primary localized osteoarthrosis, unspecified site 2/25/2015    Radiation therapy complication     Urinary tract infection, site not specified     Vaginitis and vulvovaginitis, unspecified 2/25/2015     Social History     Socioeconomic History    Marital status:      Spouse name: None    Number of children: None    Years of education: None    Highest

## 2023-11-09 ENCOUNTER — TELEPHONE (OUTPATIENT)
Dept: INTERNAL MEDICINE CLINIC | Facility: CLINIC | Age: 78
End: 2023-11-09

## 2023-11-09 DIAGNOSIS — R79.89 ELEVATED SERUM CREATININE: ICD-10-CM

## 2023-11-09 DIAGNOSIS — R30.0 DYSURIA: Primary | ICD-10-CM

## 2023-11-09 NOTE — TELEPHONE ENCOUNTER
Before providing antibiotics, I would like for her to bring us a urine sample. Put her on the schedule today for can.

## 2023-11-09 NOTE — TELEPHONE ENCOUNTER
Mr. Yancy Lennon has called and stated that  Ms. Yancy Lennon had forgotten to mention that she has an UTI. Wants to know if something can be called in for her as soon as possible?

## 2023-11-09 NOTE — TELEPHONE ENCOUNTER
----- Message from Dayton Harrison sent at 11/9/2023 10:58 AM EST -----  Subject: Message to Provider    QUESTIONS  Information for Provider? Patient would antibiotic called in for her   bladder infection she is experiencing. Would like it called into [facility].   ---------------------------------------------------------------------------  --------------  Nola KAUR  [phone]; OK to leave message on voicemail  ---------------------------------------------------------------------------  --------------  SCRIPT ANSWERS  Relationship to Patient?  Self

## 2023-11-09 NOTE — TELEPHONE ENCOUNTER
Pt was in the office yesterday for an appointment and forgot to mention Dysuria - she would like to know if something can be called in for her symptoms - please advise.

## 2023-11-09 NOTE — TELEPHONE ENCOUNTER
Your labs are stable except that your kidney function is not as at 100%. This could be because you are mildly dehydrated. Mild dehydration can also cause you to burn when you urinate. Please make sure that you are drinking plenty of water. I like to repeat blood work in 2 weeks after you increase your fluid intake.

## 2023-11-10 RX ORDER — NITROFURANTOIN 25; 75 MG/1; MG/1
100 CAPSULE ORAL 2 TIMES DAILY
Qty: 10 CAPSULE | Refills: 0 | Status: SHIPPED | OUTPATIENT
Start: 2023-11-10 | End: 2023-11-15

## 2023-11-10 NOTE — TELEPHONE ENCOUNTER
I sent in antibiotics, but I still want her to come in and give us a urinalysis for culture before she starts them. I had her eat yogurt daily while on antibiotics.

## 2023-11-13 ENCOUNTER — NURSE ONLY (OUTPATIENT)
Dept: INTERNAL MEDICINE CLINIC | Facility: CLINIC | Age: 78
End: 2023-11-13

## 2023-11-13 DIAGNOSIS — R79.89 ELEVATED SERUM CREATININE: ICD-10-CM

## 2023-11-13 DIAGNOSIS — R30.0 DYSURIA: ICD-10-CM

## 2023-11-13 DIAGNOSIS — R79.89 ELEVATED SERUM CREATININE: Primary | ICD-10-CM

## 2023-11-13 LAB
APPEARANCE UR: CLEAR
BACTERIA URNS QL MICRO: NEGATIVE /HPF
BILIRUB UR QL: NEGATIVE
CASTS URNS QL MICRO: ABNORMAL /LPF (ref 0–2)
COLOR UR: ABNORMAL
EPI CELLS #/AREA URNS HPF: ABNORMAL /HPF (ref 0–5)
GLUCOSE UR STRIP.AUTO-MCNC: NEGATIVE MG/DL
HGB UR QL STRIP: NEGATIVE
KETONES UR QL STRIP.AUTO: NEGATIVE MG/DL
LEUKOCYTE ESTERASE UR QL STRIP.AUTO: ABNORMAL
NITRITE UR QL STRIP.AUTO: NEGATIVE
PH UR STRIP: 7 (ref 5–9)
PROT UR STRIP-MCNC: NEGATIVE MG/DL
RBC #/AREA URNS HPF: ABNORMAL /HPF (ref 0–5)
SP GR UR REFRACTOMETRY: 1.01 (ref 1–1.02)
UROBILINOGEN UR QL STRIP.AUTO: 0.2 EU/DL (ref 0.2–1)
WBC URNS QL MICRO: ABNORMAL /HPF (ref 0–4)

## 2023-11-16 ENCOUNTER — OFFICE VISIT (OUTPATIENT)
Dept: INTERNAL MEDICINE CLINIC | Facility: CLINIC | Age: 78
End: 2023-11-16
Payer: MEDICARE

## 2023-11-16 VITALS
HEART RATE: 100 BPM | DIASTOLIC BLOOD PRESSURE: 68 MMHG | BODY MASS INDEX: 26.94 KG/M2 | SYSTOLIC BLOOD PRESSURE: 133 MMHG | RESPIRATION RATE: 18 BRPM | WEIGHT: 146.4 LBS | TEMPERATURE: 99.2 F | HEIGHT: 62 IN | OXYGEN SATURATION: 96 %

## 2023-11-16 DIAGNOSIS — G62.9 NEUROPATHY: Primary | ICD-10-CM

## 2023-11-16 DIAGNOSIS — G47.00 INSOMNIA, UNSPECIFIED TYPE: ICD-10-CM

## 2023-11-16 DIAGNOSIS — K59.01 SLOW TRANSIT CONSTIPATION: ICD-10-CM

## 2023-11-16 PROBLEM — N18.30 CHRONIC RENAL DISEASE, STAGE III (HCC): Status: ACTIVE | Noted: 2023-11-16

## 2023-11-16 LAB
BACTERIA SPEC CULT: NORMAL
BACTERIA SPEC CULT: NORMAL
BASOPHILS # BLD: 0 K/UL (ref 0–0.2)
BASOPHILS NFR BLD: 1 % (ref 0–2)
DIFFERENTIAL METHOD BLD: NORMAL
EOSINOPHIL # BLD: 0.3 K/UL (ref 0–0.8)
EOSINOPHIL NFR BLD: 4 % (ref 0.5–7.8)
ERYTHROCYTE [DISTWIDTH] IN BLOOD BY AUTOMATED COUNT: 14.4 % (ref 11.9–14.6)
HCT VFR BLD AUTO: 41.3 % (ref 35.8–46.3)
HGB BLD-MCNC: 13.3 G/DL (ref 11.7–15.4)
IMM GRANULOCYTES # BLD AUTO: 0 K/UL (ref 0–0.5)
IMM GRANULOCYTES NFR BLD AUTO: 0 % (ref 0–5)
LYMPHOCYTES # BLD: 1.4 K/UL (ref 0.5–4.6)
LYMPHOCYTES NFR BLD: 17 % (ref 13–44)
MCH RBC QN AUTO: 31 PG (ref 26.1–32.9)
MCHC RBC AUTO-ENTMCNC: 32.2 G/DL (ref 31.4–35)
MCV RBC AUTO: 96.3 FL (ref 82–102)
MONOCYTES # BLD: 0.7 K/UL (ref 0.1–1.3)
MONOCYTES NFR BLD: 8 % (ref 4–12)
NEUTS SEG # BLD: 5.9 K/UL (ref 1.7–8.2)
NEUTS SEG NFR BLD: 70 % (ref 43–78)
NRBC # BLD: 0 K/UL (ref 0–0.2)
PLATELET # BLD AUTO: 295 K/UL (ref 150–450)
PMV BLD AUTO: 11.3 FL (ref 9.4–12.3)
RBC # BLD AUTO: 4.29 M/UL (ref 4.05–5.2)
SERVICE CMNT-IMP: NORMAL
WBC # BLD AUTO: 8.4 K/UL (ref 4.3–11.1)

## 2023-11-16 PROCEDURE — G8399 PT W/DXA RESULTS DOCUMENT: HCPCS | Performed by: NURSE PRACTITIONER

## 2023-11-16 PROCEDURE — G8417 CALC BMI ABV UP PARAM F/U: HCPCS | Performed by: NURSE PRACTITIONER

## 2023-11-16 PROCEDURE — 99214 OFFICE O/P EST MOD 30 MIN: CPT | Performed by: NURSE PRACTITIONER

## 2023-11-16 PROCEDURE — 3075F SYST BP GE 130 - 139MM HG: CPT | Performed by: NURSE PRACTITIONER

## 2023-11-16 PROCEDURE — 1090F PRES/ABSN URINE INCON ASSESS: CPT | Performed by: NURSE PRACTITIONER

## 2023-11-16 PROCEDURE — 1036F TOBACCO NON-USER: CPT | Performed by: NURSE PRACTITIONER

## 2023-11-16 PROCEDURE — G8427 DOCREV CUR MEDS BY ELIG CLIN: HCPCS | Performed by: NURSE PRACTITIONER

## 2023-11-16 PROCEDURE — 1123F ACP DISCUSS/DSCN MKR DOCD: CPT | Performed by: NURSE PRACTITIONER

## 2023-11-16 PROCEDURE — 3078F DIAST BP <80 MM HG: CPT | Performed by: NURSE PRACTITIONER

## 2023-11-16 PROCEDURE — 1111F DSCHRG MED/CURRENT MED MERGE: CPT | Performed by: NURSE PRACTITIONER

## 2023-11-16 PROCEDURE — G8484 FLU IMMUNIZE NO ADMIN: HCPCS | Performed by: NURSE PRACTITIONER

## 2023-11-16 RX ORDER — GABAPENTIN 100 MG/1
100 CAPSULE ORAL 3 TIMES DAILY
Qty: 90 CAPSULE | Refills: 1 | Status: SHIPPED | OUTPATIENT
Start: 2023-11-16 | End: 2024-05-14

## 2023-11-16 SDOH — ECONOMIC STABILITY: INCOME INSECURITY: HOW HARD IS IT FOR YOU TO PAY FOR THE VERY BASICS LIKE FOOD, HOUSING, MEDICAL CARE, AND HEATING?: NOT HARD AT ALL

## 2023-11-16 SDOH — ECONOMIC STABILITY: FOOD INSECURITY: WITHIN THE PAST 12 MONTHS, THE FOOD YOU BOUGHT JUST DIDN'T LAST AND YOU DIDN'T HAVE MONEY TO GET MORE.: NEVER TRUE

## 2023-11-16 SDOH — ECONOMIC STABILITY: HOUSING INSECURITY
IN THE LAST 12 MONTHS, WAS THERE A TIME WHEN YOU DID NOT HAVE A STEADY PLACE TO SLEEP OR SLEPT IN A SHELTER (INCLUDING NOW)?: NO

## 2023-11-16 SDOH — ECONOMIC STABILITY: FOOD INSECURITY: WITHIN THE PAST 12 MONTHS, YOU WORRIED THAT YOUR FOOD WOULD RUN OUT BEFORE YOU GOT MONEY TO BUY MORE.: NEVER TRUE

## 2023-11-16 ASSESSMENT — ENCOUNTER SYMPTOMS
SHORTNESS OF BREATH: 0
COUGH: 0
CONSTIPATION: 1

## 2023-11-17 ENCOUNTER — TELEPHONE (OUTPATIENT)
Dept: INTERNAL MEDICINE CLINIC | Facility: CLINIC | Age: 78
End: 2023-11-17

## 2023-11-17 DIAGNOSIS — R73.09 ELEVATED GLUCOSE: Primary | ICD-10-CM

## 2023-11-17 DIAGNOSIS — R73.09 ELEVATED GLUCOSE: ICD-10-CM

## 2023-11-17 LAB
ANION GAP SERPL CALC-SCNC: 4 MMOL/L (ref 2–11)
BUN SERPL-MCNC: 14 MG/DL (ref 8–23)
CALCIUM SERPL-MCNC: 9.8 MG/DL (ref 8.3–10.4)
CHLORIDE SERPL-SCNC: 106 MMOL/L (ref 101–110)
CO2 SERPL-SCNC: 29 MMOL/L (ref 21–32)
CREAT SERPL-MCNC: 1.1 MG/DL (ref 0.6–1)
EST. AVERAGE GLUCOSE BLD GHB EST-MCNC: 105 MG/DL
GLUCOSE SERPL-MCNC: 160 MG/DL (ref 65–100)
HBA1C MFR BLD: 5.3 % (ref 4.8–5.6)
POTASSIUM SERPL-SCNC: 4.1 MMOL/L (ref 3.5–5.1)
SODIUM SERPL-SCNC: 139 MMOL/L (ref 133–143)

## 2023-11-17 NOTE — TELEPHONE ENCOUNTER
Ms. Chari Gerard-  The labs show that the glucose is elevated. I am going to add on a test to rule out diabetes. All other labs are stable. Please let us know if you develop  any new or worsening sx. Hoping the gabapentin is helpful! Mikaela    Can we add on a1c? Thanks, ordered.

## 2023-11-17 NOTE — TELEPHONE ENCOUNTER
This has been fully explained to the patient, who indicates understanding.       Add on given to [facility]

## 2023-11-19 ENCOUNTER — TELEPHONE (OUTPATIENT)
Dept: INTERNAL MEDICINE CLINIC | Facility: CLINIC | Age: 78
End: 2023-11-19

## 2023-11-19 NOTE — TELEPHONE ENCOUNTER
Ms, Julian Mendenhall-  The diabetes test was negative. Is the gabapentin helping your symptoms?    Mikaela

## 2023-11-20 NOTE — TELEPHONE ENCOUNTER
Pt notified. Patient is doing okay states that the medication did not really help at all but she has gotten a new 6week old kitten that is keeping her occupied and happy.

## 2023-12-05 ENCOUNTER — PATIENT MESSAGE (OUTPATIENT)
Dept: INTERNAL MEDICINE CLINIC | Facility: CLINIC | Age: 78
End: 2023-12-05

## 2023-12-05 DIAGNOSIS — F41.0 PANIC DISORDER: Primary | ICD-10-CM

## 2023-12-05 RX ORDER — LORAZEPAM 0.5 MG/1
0.5 TABLET ORAL 2 TIMES DAILY PRN
Qty: 60 TABLET | Refills: 5 | Status: SHIPPED | OUTPATIENT
Start: 2023-12-05 | End: 2024-06-02

## 2023-12-05 NOTE — TELEPHONE ENCOUNTER
From: Belkis Montano  To: Dr. Minaya Pleasure: 12/5/2023 11:20 AM EST  Subject: Ativan Prescription    I have a quick question about the Ativan. My dad is wanting to know if you can prescribe a .5 mg tablet instead of a 1mg tablet so he doesn't have to cut them in half. I don't even know if that exists but he wanted me to ask. Please let me know and I will relay the message. She has a refill coming due and he wanted to see if he could get that changed. Thank you so much for all that you do for us.

## 2023-12-14 ENCOUNTER — OFFICE VISIT (OUTPATIENT)
Dept: INTERNAL MEDICINE CLINIC | Facility: CLINIC | Age: 78
End: 2023-12-14

## 2023-12-14 VITALS
OXYGEN SATURATION: 96 % | DIASTOLIC BLOOD PRESSURE: 92 MMHG | RESPIRATION RATE: 18 BRPM | HEART RATE: 92 BPM | WEIGHT: 143.2 LBS | SYSTOLIC BLOOD PRESSURE: 144 MMHG | BODY MASS INDEX: 26.35 KG/M2 | HEIGHT: 62 IN | TEMPERATURE: 97.2 F

## 2023-12-14 DIAGNOSIS — G62.9 NEUROPATHY: Primary | ICD-10-CM

## 2023-12-14 DIAGNOSIS — R29.6 FREQUENT FALLS: ICD-10-CM

## 2023-12-14 DIAGNOSIS — R03.0 ELEVATED BLOOD PRESSURE READING WITHOUT DIAGNOSIS OF HYPERTENSION: ICD-10-CM

## 2023-12-14 DIAGNOSIS — F33.9 RECURRENT DEPRESSION (HCC): ICD-10-CM

## 2023-12-14 DIAGNOSIS — H61.21 IMPACTED CERUMEN OF RIGHT EAR: ICD-10-CM

## 2023-12-14 RX ORDER — DIPHENHYDRAMINE HCL 25 MG
75 CAPSULE ORAL NIGHTLY
COMMUNITY
End: 2023-12-14 | Stop reason: ALTCHOICE

## 2023-12-14 RX ORDER — GABAPENTIN 100 MG/1
200 CAPSULE ORAL
Qty: 90 CAPSULE | Refills: 1
Start: 2023-12-14 | End: 2024-06-11

## 2023-12-14 NOTE — PROGRESS NOTES
12/14/2023 1:31 PM  Location[place] [place]NAL [place]ICINE  [place]  Patient #:  [id number]  YOB: 1945          YOUR LAST HEMOGLOBIN A1CS:   No results found for: \"HBA1C\", \"[id number]\"    YOUR LAST LIPID PROFILE:   Lab Results   Component Value Date/Time    CHOL 201 06/20/2022 03:04 PM    HDL 80 06/20/2022 03:04 PM         Lab Results   Component Value Date/Time    GFRAA >60 07/18/2022 02:17 PM    BUN 14 11/16/2023 02:36 PM     11/16/2023 02:36 PM    K 4.1 11/16/2023 02:36 PM     11/16/2023 02:36 PM    CO2 29 11/16/2023 02:36 PM           History of Present Illness     Chief Complaint   Patient presents with    Follow-up     Patient states that the neuropathy is about the same     Weight Loss     Patient states that she has no appetite        Ms. Shaggy Heath is a 69 y.o. female  who presents for the above mentioned complaints. Mrs. Shaggy Heath presents for follow up after beginning gabapentin. She has been taking 1 capsule before night for her neuropathic pain which she describes as tingling in her legs and a loss of balance. Reports that this has not  been effective so far. She continues to report a loss of balance and reports that she has fallen 3 times this month. She does not use any assistive devices. She has not hit her head and denies loss of consciousness, no OAC. Reports that she stumbles. Denies current pain. Reports ear congestion and pain.            Allergies   Allergen Reactions    Penicillins Other (See Comments)     unknown     Past Medical History:   Diagnosis Date    Abdominal pain, right upper quadrant     Abnormal weight gain     Acute sinusitis, unspecified     LALY (acute kidney injury) ([address]) 10/22/2023    Benign neoplasm of colon 2/25/2015    Benign paroxysmal positional vertigo 2/25/2015    Breast cancer ([address]) 05/2017    left    Calculus of gallbladder without mention of cholecystitis or obstruction 2/25/2015    Depressive disorder, not

## 2024-01-04 ENCOUNTER — OFFICE VISIT (OUTPATIENT)
Dept: INTERNAL MEDICINE CLINIC | Facility: CLINIC | Age: 79
End: 2024-01-04
Payer: MEDICARE

## 2024-01-04 VITALS
HEART RATE: 108 BPM | SYSTOLIC BLOOD PRESSURE: 162 MMHG | HEIGHT: 62 IN | TEMPERATURE: 98.4 F | DIASTOLIC BLOOD PRESSURE: 102 MMHG | BODY MASS INDEX: 25.76 KG/M2 | OXYGEN SATURATION: 98 % | WEIGHT: 140 LBS

## 2024-01-04 DIAGNOSIS — F41.9 ANXIETY: ICD-10-CM

## 2024-01-04 DIAGNOSIS — R30.0 DYSURIA: ICD-10-CM

## 2024-01-04 DIAGNOSIS — I16.0 HYPERTENSIVE URGENCY: Primary | ICD-10-CM

## 2024-01-04 DIAGNOSIS — F51.01 PRIMARY INSOMNIA: ICD-10-CM

## 2024-01-04 DIAGNOSIS — I10 PRIMARY HYPERTENSION: ICD-10-CM

## 2024-01-04 LAB
APPEARANCE UR: CLEAR
BACTERIA URNS QL MICRO: 0 /HPF
BASOPHILS # BLD: 0 K/UL (ref 0–0.2)
BASOPHILS NFR BLD: 1 % (ref 0–2)
BILIRUB UR QL: NEGATIVE
COLOR UR: ABNORMAL
DIFFERENTIAL METHOD BLD: NORMAL
EOSINOPHIL # BLD: 0.1 K/UL (ref 0–0.8)
EOSINOPHIL NFR BLD: 1 % (ref 0.5–7.8)
EPI CELLS #/AREA URNS HPF: ABNORMAL /HPF
ERYTHROCYTE [DISTWIDTH] IN BLOOD BY AUTOMATED COUNT: 12.8 % (ref 11.9–14.6)
GLUCOSE UR STRIP.AUTO-MCNC: NEGATIVE MG/DL
HCT VFR BLD AUTO: 44.3 % (ref 35.8–46.3)
HGB BLD-MCNC: 14.5 G/DL (ref 11.7–15.4)
HGB UR QL STRIP: NEGATIVE
IMM GRANULOCYTES # BLD AUTO: 0 K/UL (ref 0–0.5)
IMM GRANULOCYTES NFR BLD AUTO: 0 % (ref 0–5)
KETONES UR QL STRIP.AUTO: ABNORMAL MG/DL
LEUKOCYTE ESTERASE UR QL STRIP.AUTO: ABNORMAL
LYMPHOCYTES # BLD: 2.7 K/UL (ref 0.5–4.6)
LYMPHOCYTES NFR BLD: 33 % (ref 13–44)
MCH RBC QN AUTO: 30.7 PG (ref 26.1–32.9)
MCHC RBC AUTO-ENTMCNC: 32.7 G/DL (ref 31.4–35)
MCV RBC AUTO: 93.7 FL (ref 82–102)
MONOCYTES # BLD: 0.5 K/UL (ref 0.1–1.3)
MONOCYTES NFR BLD: 6 % (ref 4–12)
NEUTS SEG # BLD: 4.8 K/UL (ref 1.7–8.2)
NEUTS SEG NFR BLD: 59 % (ref 43–78)
NITRITE UR QL STRIP.AUTO: NEGATIVE
NRBC # BLD: 0 K/UL (ref 0–0.2)
OTHER OBSERVATIONS: ABNORMAL
PH UR STRIP: 7 (ref 5–9)
PLATELET # BLD AUTO: 240 K/UL (ref 150–450)
PMV BLD AUTO: 11.2 FL (ref 9.4–12.3)
PROT UR STRIP-MCNC: NEGATIVE MG/DL
RBC # BLD AUTO: 4.73 M/UL (ref 4.05–5.2)
RBC #/AREA URNS HPF: ABNORMAL /HPF
SP GR UR REFRACTOMETRY: <1.005 (ref 1–1.02)
UROBILINOGEN UR QL STRIP.AUTO: 0.2 EU/DL (ref 0.2–1)
WBC # BLD AUTO: 8.2 K/UL (ref 4.3–11.1)
WBC URNS QL MICRO: ABNORMAL /HPF

## 2024-01-04 PROCEDURE — 1090F PRES/ABSN URINE INCON ASSESS: CPT | Performed by: NURSE PRACTITIONER

## 2024-01-04 PROCEDURE — 3077F SYST BP >= 140 MM HG: CPT | Performed by: NURSE PRACTITIONER

## 2024-01-04 PROCEDURE — G8399 PT W/DXA RESULTS DOCUMENT: HCPCS | Performed by: NURSE PRACTITIONER

## 2024-01-04 PROCEDURE — G8417 CALC BMI ABV UP PARAM F/U: HCPCS | Performed by: NURSE PRACTITIONER

## 2024-01-04 PROCEDURE — G8484 FLU IMMUNIZE NO ADMIN: HCPCS | Performed by: NURSE PRACTITIONER

## 2024-01-04 PROCEDURE — 93000 ELECTROCARDIOGRAM COMPLETE: CPT | Performed by: NURSE PRACTITIONER

## 2024-01-04 PROCEDURE — 3080F DIAST BP >= 90 MM HG: CPT | Performed by: NURSE PRACTITIONER

## 2024-01-04 PROCEDURE — G8427 DOCREV CUR MEDS BY ELIG CLIN: HCPCS | Performed by: NURSE PRACTITIONER

## 2024-01-04 PROCEDURE — 1036F TOBACCO NON-USER: CPT | Performed by: NURSE PRACTITIONER

## 2024-01-04 PROCEDURE — 99215 OFFICE O/P EST HI 40 MIN: CPT | Performed by: NURSE PRACTITIONER

## 2024-01-04 PROCEDURE — 1123F ACP DISCUSS/DSCN MKR DOCD: CPT | Performed by: NURSE PRACTITIONER

## 2024-01-04 RX ORDER — TRAZODONE HYDROCHLORIDE 50 MG/1
50 TABLET ORAL NIGHTLY
Qty: 30 TABLET | Refills: 0 | Status: SHIPPED | OUTPATIENT
Start: 2024-01-04

## 2024-01-04 RX ORDER — AMLODIPINE BESYLATE 5 MG/1
5 TABLET ORAL DAILY
Qty: 30 TABLET | Refills: 3 | Status: SHIPPED | OUTPATIENT
Start: 2024-01-04

## 2024-01-04 RX ORDER — CLONIDINE HYDROCHLORIDE 0.1 MG/1
0.1 TABLET ORAL ONCE
Status: SHIPPED | OUTPATIENT
Start: 2024-01-04

## 2024-01-04 ASSESSMENT — ENCOUNTER SYMPTOMS: SHORTNESS OF BREATH: 0

## 2024-01-04 NOTE — PROGRESS NOTES
1/4/2024 3:31 PM  Location:[facility] PHYSICIAN SERVICES  [facility] [place]INE  [place]  Patient #:  [id number]  YOB: 1945          YOUR LAST HEMOGLOBIN A1CS:   No results found for: \"HBA1C\", \"[id number]\"    YOUR LAST LIPID PROFILE:   Lab Results   Component Value Date/Time    CHOL 201 06/20/2022 03:04 PM    HDL 80 06/20/2022 03:04 PM         Lab Results   Component Value Date/Time    GFRAA >60 07/18/2022 02:17 PM    BUN 14 11/16/2023 02:36 PM     11/16/2023 02:36 PM    K 4.1 11/16/2023 02:36 PM     11/16/2023 02:36 PM    CO2 29 11/16/2023 02:36 PM           History of Present Illness     Chief Complaint   Patient presents with    Dysuria     Burning with urination starting Tuesday. Pt states she is not burning anymore but she was having chills.       Ms. Briseno is a 78 y.o. female  who presents for the above mentioned complaints.  Mrs. Briseno presents with her  for evaluation of insomnia, anxiety, dysuria and hypertension.  Notes that gabapentin has not been helpful, reports her legs are still numb and she is not sleeping at all, has no appetite. She recently had an intentional overdose and many of her psychiatric medications were discontinued including Seroquel, Restoril and Effexor.  She has only been taking 0.5 mg of Ativan twice daily.  She denies suicidal or homicidal ideations, auditory or visual hallucinations.  She denies headaches, vision changes, chest pain, shortness of breath or leg swelling.  Has not been checking her blood pressures.    She thought she had a urinary tract infection the other day but those symptoms have since cleared.             Allergies   Allergen Reactions    Penicillins Other (See Comments)     unknown     Past Medical History:   Diagnosis Date    Abdominal pain, right upper quadrant     Abnormal weight gain     Acute sinusitis, unspecified     LALY (acute kidney injury) ([facility]) 10/22/2023    Benign neoplasm of colon 2/25/2015    Benign

## 2024-01-05 ENCOUNTER — TELEPHONE (OUTPATIENT)
Dept: INTERNAL MEDICINE CLINIC | Facility: CLINIC | Age: 79
End: 2024-01-05

## 2024-01-05 LAB
ANION GAP SERPL CALC-SCNC: 7 MMOL/L (ref 2–11)
BUN SERPL-MCNC: 14 MG/DL (ref 8–23)
CALCIUM SERPL-MCNC: 9.6 MG/DL (ref 8.3–10.4)
CHLORIDE SERPL-SCNC: 102 MMOL/L (ref 103–113)
CO2 SERPL-SCNC: 28 MMOL/L (ref 21–32)
CREAT SERPL-MCNC: 0.9 MG/DL (ref 0.6–1)
GLUCOSE SERPL-MCNC: 101 MG/DL (ref 65–100)
POTASSIUM SERPL-SCNC: 4 MMOL/L (ref 3.5–5.1)
SODIUM SERPL-SCNC: 137 MMOL/L (ref 136–146)

## 2024-01-05 NOTE — TELEPHONE ENCOUNTER
Sorry she did not sleep last night. Try taking 2 Trazodone instead of one.   Use the Ativan liberally and as needed for now.   Continue to monitor your blood pressure. Can also increase Amlodipine to 2 tablets if blood pressures continue above 150/90.   Keep follow up appointment.   Please let us know if you develop  any new or worsening sxAretha Al

## 2024-01-05 NOTE — TELEPHONE ENCOUNTER
151/98 this morning. Pt given results and relayed understanding. Pt reports that she did not sleep at all last night. Took the sleeping medication you prescribed and it did not help. She does not want to go back to psychiatry.

## 2024-01-05 NOTE — TELEPHONE ENCOUNTER
Aretha Finn-  The labs that we checked yesterday are stable.   How is your blood pressure? How did you sleep?   Please let us know if you develop  any new or worsening sx.  Mikaela

## 2024-01-07 LAB
BACTERIA SPEC CULT: NORMAL
BACTERIA SPEC CULT: NORMAL
SERVICE CMNT-IMP: NORMAL

## 2024-01-08 ENCOUNTER — TELEPHONE (OUTPATIENT)
Dept: INTERNAL MEDICINE CLINIC | Facility: CLINIC | Age: 79
End: 2024-01-08

## 2024-01-08 ENCOUNTER — TELEMEDICINE (OUTPATIENT)
Dept: INTERNAL MEDICINE CLINIC | Facility: CLINIC | Age: 79
End: 2024-01-08

## 2024-01-08 DIAGNOSIS — Z85.3 PERSONAL HISTORY OF BREAST CANCER: ICD-10-CM

## 2024-01-08 DIAGNOSIS — F51.01 PRIMARY INSOMNIA: ICD-10-CM

## 2024-01-08 DIAGNOSIS — I10 PRIMARY HYPERTENSION: Primary | ICD-10-CM

## 2024-01-08 DIAGNOSIS — F33.9 RECURRENT DEPRESSION (HCC): ICD-10-CM

## 2024-01-08 PROBLEM — R56.9 SEIZURE (HCC): Status: ACTIVE | Noted: 2023-10-31

## 2024-01-08 ASSESSMENT — PATIENT HEALTH QUESTIONNAIRE - PHQ9
SUM OF ALL RESPONSES TO PHQ QUESTIONS 1-9: 21
SUM OF ALL RESPONSES TO PHQ9 QUESTIONS 1 & 2: 6
3. TROUBLE FALLING OR STAYING ASLEEP: 3
6. FEELING BAD ABOUT YOURSELF - OR THAT YOU ARE A FAILURE OR HAVE LET YOURSELF OR YOUR FAMILY DOWN: 3
4. FEELING TIRED OR HAVING LITTLE ENERGY: 3
8. MOVING OR SPEAKING SO SLOWLY THAT OTHER PEOPLE COULD HAVE NOTICED. OR THE OPPOSITE, BEING SO FIGETY OR RESTLESS THAT YOU HAVE BEEN MOVING AROUND A LOT MORE THAN USUAL: 0
5. POOR APPETITE OR OVEREATING: 2
2. FEELING DOWN, DEPRESSED OR HOPELESS: 3
10. IF YOU CHECKED OFF ANY PROBLEMS, HOW DIFFICULT HAVE THESE PROBLEMS MADE IT FOR YOU TO DO YOUR WORK, TAKE CARE OF THINGS AT HOME, OR GET ALONG WITH OTHER PEOPLE: 2
9. THOUGHTS THAT YOU WOULD BE BETTER OFF DEAD, OR OF HURTING YOURSELF: 3
7. TROUBLE CONCENTRATING ON THINGS, SUCH AS READING THE NEWSPAPER OR WATCHING TELEVISION: 1
SUM OF ALL RESPONSES TO PHQ QUESTIONS 1-9: 18
SUM OF ALL RESPONSES TO PHQ QUESTIONS 1-9: 21
SUM OF ALL RESPONSES TO PHQ QUESTIONS 1-9: 21
1. LITTLE INTEREST OR PLEASURE IN DOING THINGS: 3

## 2024-01-08 ASSESSMENT — COLUMBIA-SUICIDE SEVERITY RATING SCALE - C-SSRS
1. WITHIN THE PAST MONTH, HAVE YOU WISHED YOU WERE DEAD OR WISHED YOU COULD GO TO SLEEP AND NOT WAKE UP?: NO
6. HAVE YOU EVER DONE ANYTHING, STARTED TO DO ANYTHING, OR PREPARED TO DO ANYTHING TO END YOUR LIFE?: NO
2. HAVE YOU ACTUALLY HAD ANY THOUGHTS OF KILLING YOURSELF?: NO

## 2024-01-08 NOTE — TELEPHONE ENCOUNTER
----- Message from Salma Ny sent at 1/8/2024  1:02 PM EST -----  Subject: Message to Provider    QUESTIONS  Information for Provider? Patient called at 12:50 pm on 01/08/24 to cancel   today's appointment at 01/08/24 at 2:45 PM. System would not allow [facility] to   cancel. Patient needs to reschedule AWV. Please call.   ---------------------------------------------------------------------------  --------------  CALL BACK INFO  [phone]; OK to leave message on voicemail  ---------------------------------------------------------------------------  --------------  SCRIPT ANSWERS  Relationship to Patient? Self

## 2024-01-08 NOTE — TELEPHONE ENCOUNTER
Please call and see if the patient can see me in the office tomorrow at 4:00 since she is not able to get on MyChart.  Thanks.  NOEMI

## 2024-01-22 ENCOUNTER — OFFICE VISIT (OUTPATIENT)
Dept: INTERNAL MEDICINE CLINIC | Facility: CLINIC | Age: 79
End: 2024-01-22
Payer: MEDICARE

## 2024-01-22 VITALS
WEIGHT: 137 LBS | SYSTOLIC BLOOD PRESSURE: 152 MMHG | HEART RATE: 111 BPM | DIASTOLIC BLOOD PRESSURE: 95 MMHG | HEIGHT: 62 IN | RESPIRATION RATE: 16 BRPM | BODY MASS INDEX: 25.21 KG/M2

## 2024-01-22 DIAGNOSIS — K59.00 CONSTIPATION, UNSPECIFIED CONSTIPATION TYPE: Primary | ICD-10-CM

## 2024-01-22 DIAGNOSIS — F41.0 PANIC DISORDER: ICD-10-CM

## 2024-01-22 DIAGNOSIS — F33.9 RECURRENT DEPRESSION (HCC): ICD-10-CM

## 2024-01-22 DIAGNOSIS — G47.9 SLEEP DISTURBANCES: ICD-10-CM

## 2024-01-22 DIAGNOSIS — I10 ESSENTIAL HYPERTENSION, BENIGN: ICD-10-CM

## 2024-01-22 DIAGNOSIS — F41.9 ANXIETY: ICD-10-CM

## 2024-01-22 PROCEDURE — 1090F PRES/ABSN URINE INCON ASSESS: CPT | Performed by: INTERNAL MEDICINE

## 2024-01-22 PROCEDURE — G8484 FLU IMMUNIZE NO ADMIN: HCPCS | Performed by: INTERNAL MEDICINE

## 2024-01-22 PROCEDURE — G8417 CALC BMI ABV UP PARAM F/U: HCPCS | Performed by: INTERNAL MEDICINE

## 2024-01-22 PROCEDURE — 3077F SYST BP >= 140 MM HG: CPT | Performed by: INTERNAL MEDICINE

## 2024-01-22 PROCEDURE — 99214 OFFICE O/P EST MOD 30 MIN: CPT | Performed by: INTERNAL MEDICINE

## 2024-01-22 PROCEDURE — 3080F DIAST BP >= 90 MM HG: CPT | Performed by: INTERNAL MEDICINE

## 2024-01-22 PROCEDURE — G8399 PT W/DXA RESULTS DOCUMENT: HCPCS | Performed by: INTERNAL MEDICINE

## 2024-01-22 PROCEDURE — 1123F ACP DISCUSS/DSCN MKR DOCD: CPT | Performed by: INTERNAL MEDICINE

## 2024-01-22 PROCEDURE — 1036F TOBACCO NON-USER: CPT | Performed by: INTERNAL MEDICINE

## 2024-01-22 PROCEDURE — G8427 DOCREV CUR MEDS BY ELIG CLIN: HCPCS | Performed by: INTERNAL MEDICINE

## 2024-01-22 RX ORDER — DOCUSATE SODIUM 100 MG/1
100 CAPSULE, LIQUID FILLED ORAL 2 TIMES DAILY
Qty: 60 CAPSULE | Status: SHIPPED | OUTPATIENT
Start: 2024-01-22 | End: 2024-02-21

## 2024-01-22 RX ORDER — ENEMA 19; 7 G/133ML; G/133ML
1 ENEMA RECTAL
Refills: 0 | COMMUNITY
Start: 2024-01-22 | End: 2024-01-22

## 2024-01-22 RX ORDER — DOCUSATE SODIUM 100 MG/1
100 CAPSULE, LIQUID FILLED ORAL 2 TIMES DAILY
Qty: 60 CAPSULE | Refills: 0 | Status: SHIPPED | OUTPATIENT
Start: 2024-01-22 | End: 2024-01-22

## 2024-01-22 RX ORDER — BISACODYL 10 MG
10 SUPPOSITORY, RECTAL RECTAL DAILY
Qty: 30 SUPPOSITORY | Refills: 0 | Status: SHIPPED | OUTPATIENT
Start: 2024-01-22 | End: 2024-02-21

## 2024-01-22 RX ORDER — LORAZEPAM 1 MG/1
1 TABLET ORAL EVERY 12 HOURS
Qty: 60 TABLET | Refills: 5 | Status: SHIPPED | OUTPATIENT
Start: 2024-01-22 | End: 2024-07-20

## 2024-01-22 RX ORDER — ENEMA 19; 7 G/133ML; G/133ML
1 ENEMA RECTAL
Qty: 2 EACH | Refills: 1 | Status: SHIPPED | OUTPATIENT
Start: 2024-01-22 | End: 2024-01-22

## 2024-01-22 RX ORDER — ESCITALOPRAM OXALATE 10 MG/1
10 TABLET ORAL DAILY
Qty: 30 TABLET | Refills: 5 | Status: SHIPPED | OUTPATIENT
Start: 2024-01-22

## 2024-01-22 ASSESSMENT — PATIENT HEALTH QUESTIONNAIRE - PHQ9
SUM OF ALL RESPONSES TO PHQ9 QUESTIONS 1 & 2: 6
3. TROUBLE FALLING OR STAYING ASLEEP: 2
7. TROUBLE CONCENTRATING ON THINGS, SUCH AS READING THE NEWSPAPER OR WATCHING TELEVISION: 1
SUM OF ALL RESPONSES TO PHQ QUESTIONS 1-9: 14
8. MOVING OR SPEAKING SO SLOWLY THAT OTHER PEOPLE COULD HAVE NOTICED. OR THE OPPOSITE, BEING SO FIGETY OR RESTLESS THAT YOU HAVE BEEN MOVING AROUND A LOT MORE THAN USUAL: 1
2. FEELING DOWN, DEPRESSED OR HOPELESS: 3
9. THOUGHTS THAT YOU WOULD BE BETTER OFF DEAD, OR OF HURTING YOURSELF: 1
4. FEELING TIRED OR HAVING LITTLE ENERGY: 2
10. IF YOU CHECKED OFF ANY PROBLEMS, HOW DIFFICULT HAVE THESE PROBLEMS MADE IT FOR YOU TO DO YOUR WORK, TAKE CARE OF THINGS AT HOME, OR GET ALONG WITH OTHER PEOPLE: 3
5. POOR APPETITE OR OVEREATING: 1
1. LITTLE INTEREST OR PLEASURE IN DOING THINGS: 3
6. FEELING BAD ABOUT YOURSELF - OR THAT YOU ARE A FAILURE OR HAVE LET YOURSELF OR YOUR FAMILY DOWN: 1
SUM OF ALL RESPONSES TO PHQ QUESTIONS 1-9: 15

## 2024-01-22 ASSESSMENT — LIFESTYLE VARIABLES
HOW MANY STANDARD DRINKS CONTAINING ALCOHOL DO YOU HAVE ON A TYPICAL DAY: PATIENT DOES NOT DRINK
HOW OFTEN DO YOU HAVE A DRINK CONTAINING ALCOHOL: NEVER

## 2024-01-22 ASSESSMENT — COLUMBIA-SUICIDE SEVERITY RATING SCALE - C-SSRS
3. HAVE YOU BEEN THINKING ABOUT HOW YOU MIGHT KILL YOURSELF?: YES
2. HAVE YOU ACTUALLY HAD ANY THOUGHTS OF KILLING YOURSELF?: YES
7. DID THIS OCCUR IN THE LAST THREE MONTHS: YES
1. WITHIN THE PAST MONTH, HAVE YOU WISHED YOU WERE DEAD OR WISHED YOU COULD GO TO SLEEP AND NOT WAKE UP?: YES
4. HAVE YOU HAD THESE THOUGHTS AND HAD SOME INTENTION OF ACTING ON THEM?: YES
6. HAVE YOU EVER DONE ANYTHING, STARTED TO DO ANYTHING, OR PREPARED TO DO ANYTHING TO END YOUR LIFE?: YES
5. HAVE YOU STARTED TO WORK OUT OR WORKED OUT THE DETAILS OF HOW TO KILL YOURSELF? DO YOU INTEND TO CARRY OUT THIS PLAN?: YES

## 2024-01-22 ASSESSMENT — ENCOUNTER SYMPTOMS
CONSTIPATION: 1
ABDOMINAL PAIN: 1
BLOOD IN STOOL: 0
VOMITING: 0

## 2024-01-22 NOTE — PROGRESS NOTES
1/22/2024 7:09 PM  Location:[facility] PHYSICIAN SERVICES  [facility] [place]ICINE  [place]  Patient #:  [id number]  YOB: 1945            History of Present Illness     Chief Complaint   Patient presents with    6 Month Follow-Up     6 month f/u    Constipation     Complains with constipation    Medicare AWV     Medicare Wellness       Ms. Briseno is a 78 y.o. female  who presents for the above.   Notes that she doesn't eat.  Thinks the milk of mag was helping.  Had to take more and this caused some leakage.  Had to manually extract recently.  Is not taking a stool softener.  Notes that she has not improved emotionally.  Initially after hospitalization was just grateful to be alive.  Now she has trouble sleeping and feels depressed and anxious most of the time.  Denies suicidal thoughts or ideation.           Allergies   Allergen Reactions    Penicillins Other (See Comments)     unknown     Past Medical History:   Diagnosis Date    Abdominal pain, right upper quadrant     Abnormal weight gain     Acute sinusitis, unspecified     LALY (acute kidney injury) ([facility]) 10/22/2023    Benign neoplasm of colon 2/25/2015    Benign paroxysmal positional vertigo 2/25/2015    Breast cancer ([facility]) 05/2017    left    Calculus of gallbladder without mention of cholecystitis or obstruction 2/25/2015    Depressive disorder, not elsewhere classified 2/25/2015    Dysuria     Essential hypertension, benign 2/25/2015    Insomnia, unspecified 2/25/2015    Nausea alone     Neoplasm of uncertain behavior of skin     Obesity 05/28/2019    Other malaise and fatigue     Primary localized osteoarthrosis, unspecified site 2/25/2015    Radiation therapy complication     Seizure ([facility]) 10/31/2023    Urinary tract infection, site not specified     Vaginitis and vulvovaginitis, unspecified 2/25/2015     Social History     Socioeconomic History    Marital status:      Spouse name: None    Number of children: None    Years of

## 2024-01-24 ENCOUNTER — TELEPHONE (OUTPATIENT)
Dept: INTERNAL MEDICINE CLINIC | Facility: CLINIC | Age: 79
End: 2024-01-24

## 2024-01-24 DIAGNOSIS — K59.00 CONSTIPATION, UNSPECIFIED CONSTIPATION TYPE: ICD-10-CM

## 2024-01-24 NOTE — TELEPHONE ENCOUNTER
Abdominal x-ray reveals a moderate to large stool burden.  Start with the Dulcolax suppository and fleets enema.  If you do not get results from these after 6 hours then repeat each.  If you get results but they are inadequate, take the mag citrate that was prescribed.  I hope you are doing okay.

## 2024-02-19 ENCOUNTER — OFFICE VISIT (OUTPATIENT)
Dept: INTERNAL MEDICINE CLINIC | Facility: CLINIC | Age: 79
End: 2024-02-19
Payer: MEDICARE

## 2024-02-19 VITALS
HEIGHT: 62 IN | BODY MASS INDEX: 25.03 KG/M2 | HEART RATE: 100 BPM | WEIGHT: 136 LBS | DIASTOLIC BLOOD PRESSURE: 90 MMHG | RESPIRATION RATE: 16 BRPM | SYSTOLIC BLOOD PRESSURE: 141 MMHG

## 2024-02-19 DIAGNOSIS — I10 ESSENTIAL HYPERTENSION, BENIGN: Primary | ICD-10-CM

## 2024-02-19 DIAGNOSIS — K56.49 IMPACTION OF COLON (HCC): ICD-10-CM

## 2024-02-19 DIAGNOSIS — F41.0 PANIC DISORDER: ICD-10-CM

## 2024-02-19 DIAGNOSIS — G47.00 INSOMNIA, UNSPECIFIED TYPE: ICD-10-CM

## 2024-02-19 DIAGNOSIS — F33.9 RECURRENT DEPRESSION (HCC): ICD-10-CM

## 2024-02-19 DIAGNOSIS — K59.00 CONSTIPATION, UNSPECIFIED CONSTIPATION TYPE: ICD-10-CM

## 2024-02-19 PROCEDURE — 1090F PRES/ABSN URINE INCON ASSESS: CPT | Performed by: INTERNAL MEDICINE

## 2024-02-19 PROCEDURE — G8484 FLU IMMUNIZE NO ADMIN: HCPCS | Performed by: INTERNAL MEDICINE

## 2024-02-19 PROCEDURE — G8420 CALC BMI NORM PARAMETERS: HCPCS | Performed by: INTERNAL MEDICINE

## 2024-02-19 PROCEDURE — 3077F SYST BP >= 140 MM HG: CPT | Performed by: INTERNAL MEDICINE

## 2024-02-19 PROCEDURE — 1123F ACP DISCUSS/DSCN MKR DOCD: CPT | Performed by: INTERNAL MEDICINE

## 2024-02-19 PROCEDURE — 1036F TOBACCO NON-USER: CPT | Performed by: INTERNAL MEDICINE

## 2024-02-19 PROCEDURE — G8399 PT W/DXA RESULTS DOCUMENT: HCPCS | Performed by: INTERNAL MEDICINE

## 2024-02-19 PROCEDURE — 99213 OFFICE O/P EST LOW 20 MIN: CPT | Performed by: INTERNAL MEDICINE

## 2024-02-19 PROCEDURE — 3079F DIAST BP 80-89 MM HG: CPT | Performed by: INTERNAL MEDICINE

## 2024-02-19 PROCEDURE — G8427 DOCREV CUR MEDS BY ELIG CLIN: HCPCS | Performed by: INTERNAL MEDICINE

## 2024-02-19 RX ORDER — LORAZEPAM 0.5 MG/1
0.5 TABLET ORAL EVERY 12 HOURS PRN
COMMUNITY

## 2024-02-19 ASSESSMENT — ENCOUNTER SYMPTOMS
ABDOMINAL PAIN: 0
BLOOD IN STOOL: 0
CONSTIPATION: 1

## 2024-02-19 NOTE — PROGRESS NOTES
(136 lb)   BMI 24.87 kg/m²       Physical Exam    Physical Exam  Constitutional:       General: She is not in acute distress.     Appearance: Normal appearance.   HENT:      Head: Normocephalic.   Neurological:      Mental Status: She is alert and oriented to person, place, and time.   Psychiatric:         Mood and Affect: Mood normal.         Behavior: Behavior normal.           Assessment & Plan    Current Outpatient Medications   Medication Sig Dispense Refill    LORazepam (ATIVAN) 0.5 MG tablet Take 1 tablet by mouth every 12 hours as needed for Anxiety.      escitalopram (LEXAPRO) 10 MG tablet Take 1 tablet by mouth daily 1/2 tab daily 5-6 days 30 tablet 5    bisacodyl (DULCOLAX) 10 MG suppository Place 1 suppository rectally daily 30 suppository 0    docusate sodium (COLACE) 100 MG capsule Take 1 capsule by mouth 2 times daily 60 capsule PRN    magnesium citrate solution Take 296 mLs by mouth once for 1 dose 296 mL 0    amLODIPine (NORVASC) 5 MG tablet Take 1 tablet by mouth daily 30 tablet 3    magnesium hydroxide (MILK OF MAGNESIA) 400 MG/5ML suspension Take 30 mLs by mouth daily as needed for Constipation      levETIRAcetam (KEPPRA) 500 MG tablet Take 1 tablet by mouth 2 times daily      LORazepam (ATIVAN) 1 MG tablet Take 1 tablet by mouth in the morning and 1 tablet in the evening. Do all this for 180 days. Max Daily Amount: 2 mg. (Patient not taking: Reported on 2/19/2024) 60 tablet 5     Current Facility-Administered Medications   Medication Dose Route Frequency Provider Last Rate Last Admin    cloNIDine (CATAPRES) tablet 0.1 mg  0.1 mg Oral Once Caron Lorenzo, LAURA - CNP           Orders Placed This Encounter   Procedures    [facility] Psych[facility] (combinatorial pharmacogenomics test)     Standing Status:   Future     Standing Expiration Date:   2/19/2025    [facility]     Referral Priority:   Urgent     Referral Type:   Eval and Treat     Referral Reason:

## 2024-02-26 ENCOUNTER — PATIENT MESSAGE (OUTPATIENT)
Dept: INTERNAL MEDICINE CLINIC | Facility: CLINIC | Age: 79
End: 2024-02-26

## 2024-02-27 RX ORDER — LEVETIRACETAM 250 MG/1
250 TABLET ORAL 2 TIMES DAILY
Qty: 60 TABLET | Refills: 0 | Status: SHIPPED | OUTPATIENT
Start: 2024-02-27

## 2024-02-27 NOTE — TELEPHONE ENCOUNTER
From: Tiffanie Briseno  To: Dr. Chari Gu  Sent: 2/26/2024 9:09 AM EST  Subject: Anit-Seizure Meds    Hi, Dr. Gu. My mom is refusing to keep her appointment at the neurologist. She has cancelled it twice since January and refuses to go. She is on Keplar but only has 10 days of meds left. I feel like they will take her off of it but I am not sure what to do if she won't go. I thought it may be ok to wean her off but I don't know how to do that. My dad is stressing and we need some guidance on this. Would you please let me know what we need to do? Or should she stay on the meds? I know without seeing her, they won't keep prescribing and you have seen her recently. Let me know what you want us to do. Thank you!

## 2024-02-28 ENCOUNTER — OFFICE VISIT (OUTPATIENT)
Dept: GASTROENTEROLOGY | Age: 79
End: 2024-02-28
Payer: MEDICARE

## 2024-02-28 VITALS
BODY MASS INDEX: 24.69 KG/M2 | TEMPERATURE: 98.4 F | OXYGEN SATURATION: 96 % | DIASTOLIC BLOOD PRESSURE: 81 MMHG | WEIGHT: 135 LBS | SYSTOLIC BLOOD PRESSURE: 139 MMHG | HEART RATE: 106 BPM

## 2024-02-28 DIAGNOSIS — K59.00 CONSTIPATION, UNSPECIFIED CONSTIPATION TYPE: Primary | ICD-10-CM

## 2024-02-28 PROCEDURE — G8484 FLU IMMUNIZE NO ADMIN: HCPCS | Performed by: STUDENT IN AN ORGANIZED HEALTH CARE EDUCATION/TRAINING PROGRAM

## 2024-02-28 PROCEDURE — 3079F DIAST BP 80-89 MM HG: CPT | Performed by: STUDENT IN AN ORGANIZED HEALTH CARE EDUCATION/TRAINING PROGRAM

## 2024-02-28 PROCEDURE — 1123F ACP DISCUSS/DSCN MKR DOCD: CPT | Performed by: STUDENT IN AN ORGANIZED HEALTH CARE EDUCATION/TRAINING PROGRAM

## 2024-02-28 PROCEDURE — 1090F PRES/ABSN URINE INCON ASSESS: CPT | Performed by: STUDENT IN AN ORGANIZED HEALTH CARE EDUCATION/TRAINING PROGRAM

## 2024-02-28 PROCEDURE — G8427 DOCREV CUR MEDS BY ELIG CLIN: HCPCS | Performed by: STUDENT IN AN ORGANIZED HEALTH CARE EDUCATION/TRAINING PROGRAM

## 2024-02-28 PROCEDURE — G8420 CALC BMI NORM PARAMETERS: HCPCS | Performed by: STUDENT IN AN ORGANIZED HEALTH CARE EDUCATION/TRAINING PROGRAM

## 2024-02-28 PROCEDURE — 99203 OFFICE O/P NEW LOW 30 MIN: CPT | Performed by: STUDENT IN AN ORGANIZED HEALTH CARE EDUCATION/TRAINING PROGRAM

## 2024-02-28 PROCEDURE — 3075F SYST BP GE 130 - 139MM HG: CPT | Performed by: STUDENT IN AN ORGANIZED HEALTH CARE EDUCATION/TRAINING PROGRAM

## 2024-02-28 PROCEDURE — 1036F TOBACCO NON-USER: CPT | Performed by: STUDENT IN AN ORGANIZED HEALTH CARE EDUCATION/TRAINING PROGRAM

## 2024-02-28 PROCEDURE — G8399 PT W/DXA RESULTS DOCUMENT: HCPCS | Performed by: STUDENT IN AN ORGANIZED HEALTH CARE EDUCATION/TRAINING PROGRAM

## 2024-02-28 NOTE — PATIENT INSTRUCTIONS
First you will do a bowel cleansing with miralax.   Then after bowel cleansing start taking Linzess 290 mcg daily.   Along with linzess I also want you to take 2 caps of miralax twice daily.

## 2024-02-28 NOTE — PROGRESS NOTES
Tiffanie Briseno is 78 y.o. y/o female here for initial evaluation.     X-ray on 1/24/2024 showed moderate stool burden.    She reports that she has had colonoscopy within the last 5 years that was unremarkable.  Reports that she has been depressed/anxious for the last 6 years and whenever she gets these change in mood she would not be able to have regular bowel movements, she reports that for the last 6 weeks she has not had regular bowel movements.  She has tried Dulcolax, milk of magnesia, various laxatives without any improvement in her symptoms.  Denies any blood in the stool.    Past Medical History:   Diagnosis Date    Abdominal pain, right upper quadrant     Abnormal weight gain     Acute sinusitis, unspecified     LALY (acute kidney injury) ([facility]) 10/22/2023    Benign neoplasm of colon 2/25/2015    Benign paroxysmal positional vertigo 2/25/2015    Breast cancer ([facility]) 05/2017    left    Calculus of gallbladder without mention of cholecystitis or obstruction 2/25/2015    Depressive disorder, not elsewhere classified 2/25/2015    Dysuria     Essential hypertension, benign 2/25/2015    Insomnia, unspecified 2/25/2015    Nausea alone     Neoplasm of uncertain behavior of skin     Obesity 05/28/2019    Other malaise and fatigue     Primary localized osteoarthrosis, unspecified site 2/25/2015    Radiation therapy complication     Seizure ([facility]) 10/31/2023    Urinary tract infection, site not specified     Vaginitis and vulvovaginitis, unspecified 2/25/2015     Past Surgical History:   Procedure Laterality Date    BREAST BIOPSY Left 04/03/2019    US Core Bx (minimal intraductal hyperplasia    BREAST BIOPSY Left 04/10/2017    US Core BX  (positive)    BREAST BIOPSY Left 05/05/2017    Needle Local    BREAST LUMPECTOMY Left 5/5/2017    LEFT BREAST NEEDLE LOCALIZED LUMPECTOMY performed by Varinder See MD at [facility] MAIN OR    BREAST LUMPECTOMY Right     benign per pt    US BREAST BIOPSY W LOC DEVICE 1ST LESION LEFT

## 2024-03-28 ENCOUNTER — TELEPHONE (OUTPATIENT)
Dept: INTERNAL MEDICINE CLINIC | Facility: CLINIC | Age: 79
End: 2024-03-28

## 2024-03-28 RX ORDER — DESVENLAFAXINE SUCCINATE 50 MG/1
50 TABLET, EXTENDED RELEASE ORAL DAILY
Qty: 30 TABLET | Refills: 5 | Status: SHIPPED | OUTPATIENT
Start: 2024-03-28

## 2024-03-28 RX ORDER — DESVENLAFAXINE 25 MG/1
25 TABLET, EXTENDED RELEASE ORAL DAILY
Qty: 14 TABLET | Refills: 0 | Status: SHIPPED | OUTPATIENT
Start: 2024-03-28

## 2024-03-28 RX ORDER — LEVETIRACETAM 250 MG/1
250 TABLET ORAL 2 TIMES DAILY
Qty: 60 TABLET | Refills: 11 | Status: SHIPPED | OUTPATIENT
Start: 2024-03-28

## 2024-03-28 NOTE — TELEPHONE ENCOUNTER
GeneSHeroku revealed that you may do well on Pristiq if this is something you'd like to try switching to.  Are you feeling any better?  Are you bowels moving ok?

## 2024-03-28 NOTE — TELEPHONE ENCOUNTER
While on the first week of pristiq, take half of the lexapro.  After 7 days of 5 mg lexapro, stop this medication.  We will discuss this more next week at your visit.  Thanks.  NOEMI

## 2024-03-28 NOTE — TELEPHONE ENCOUNTER
Spoke with pt - she states her bowels are moving - she would like to try the Pristiq  She states she is doing the same as before.

## 2024-04-01 ENCOUNTER — OFFICE VISIT (OUTPATIENT)
Dept: INTERNAL MEDICINE CLINIC | Facility: CLINIC | Age: 79
End: 2024-04-01
Payer: MEDICARE

## 2024-04-01 VITALS
BODY MASS INDEX: 24.18 KG/M2 | HEART RATE: 89 BPM | TEMPERATURE: 98.1 F | OXYGEN SATURATION: 94 % | SYSTOLIC BLOOD PRESSURE: 126 MMHG | DIASTOLIC BLOOD PRESSURE: 78 MMHG | HEIGHT: 62 IN | WEIGHT: 131.4 LBS

## 2024-04-01 DIAGNOSIS — F33.9 RECURRENT DEPRESSION (HCC): ICD-10-CM

## 2024-04-01 DIAGNOSIS — F41.0 PANIC DISORDER: ICD-10-CM

## 2024-04-01 DIAGNOSIS — I10 ESSENTIAL HYPERTENSION, BENIGN: ICD-10-CM

## 2024-04-01 DIAGNOSIS — F41.9 ANXIETY: Primary | ICD-10-CM

## 2024-04-01 DIAGNOSIS — K59.00 CONSTIPATION, UNSPECIFIED CONSTIPATION TYPE: ICD-10-CM

## 2024-04-01 PROCEDURE — G8399 PT W/DXA RESULTS DOCUMENT: HCPCS | Performed by: INTERNAL MEDICINE

## 2024-04-01 PROCEDURE — 3078F DIAST BP <80 MM HG: CPT | Performed by: INTERNAL MEDICINE

## 2024-04-01 PROCEDURE — G8420 CALC BMI NORM PARAMETERS: HCPCS | Performed by: INTERNAL MEDICINE

## 2024-04-01 PROCEDURE — 1036F TOBACCO NON-USER: CPT | Performed by: INTERNAL MEDICINE

## 2024-04-01 PROCEDURE — 99213 OFFICE O/P EST LOW 20 MIN: CPT | Performed by: INTERNAL MEDICINE

## 2024-04-01 PROCEDURE — G8427 DOCREV CUR MEDS BY ELIG CLIN: HCPCS | Performed by: INTERNAL MEDICINE

## 2024-04-01 PROCEDURE — 1090F PRES/ABSN URINE INCON ASSESS: CPT | Performed by: INTERNAL MEDICINE

## 2024-04-01 PROCEDURE — 1123F ACP DISCUSS/DSCN MKR DOCD: CPT | Performed by: INTERNAL MEDICINE

## 2024-04-01 PROCEDURE — 3074F SYST BP LT 130 MM HG: CPT | Performed by: INTERNAL MEDICINE

## 2024-04-01 NOTE — PROGRESS NOTES
Patient: Arturo Waters MRN: [id number]  SSN: [id number]    YOB: 1945  Age: 71 y.o. Sex: female      DIAGNOSIS: Left breast invasive ductal carcinoma, [id number], Stage IA. ER 97%, MN 86%, HER2 Positive.       PREVIOUS TREATMENT:  1) 5/5/17:  Left breast lumpectomy. TREATMENT SITE:  Left breast    DOSE and FRACTIONATION:  16/16 fractions, 4256 cGy of 4256 cGy, 3/5 boost.      INTERVAL HISTORY:  Arturo Waters is a 71 y.o. female being treated for breast cancer. She was doing well without complaints week 1-3. OBJECTIVE:  No findings week 1. Mild pink color week 2. There were no vitals taken for this visit. Lab Results   Component Value Date/Time    Sodium 138 07/03/2017 10:02 AM    Potassium 3.4 07/03/2017 10:02 AM    Chloride 98 07/03/2017 10:02 AM    CO2 32 07/03/2017 10:02 AM    Anion gap 8 07/03/2017 10:02 AM    Glucose 114 07/03/2017 10:02 AM    BUN 23 07/03/2017 10:02 AM    Creatinine 1.13 07/03/2017 10:02 AM    GFR est AA >60 07/03/2017 10:02 AM    GFR est non-AA 50 07/03/2017 10:02 AM    Calcium 9.8 07/03/2017 10:02 AM    Albumin 3.7 07/03/2017 10:02 AM    Protein, total 7.6 07/03/2017 10:02 AM    Globulin 3.9 07/03/2017 10:02 AM    A-G Ratio 0.9 07/03/2017 10:02 AM    AST (SGOT) 20 07/03/2017 10:02 AM    ALT (SGPT) 30 07/03/2017 10:02 AM     Lab Results   Component Value Date/Time    WBC 9.9 07/03/2017 10:02 AM    HGB 13.3 07/03/2017 10:02 AM    HCT 39.1 07/03/2017 10:02 AM    PLATELET 530 57/25/0720 10:02 AM       ASSESSMENT and PLAN:  Arturo Waters is tolerating radiation as anticipated for the current dose and fraction. We will continue on as planned with with treatment to be completed Wednesday. Will plan for 1 month follow up. She is again contemplating chemo so will set her up with Dr. Daron Nazario.         Song Bell MD   August 14, 2017 Normal

## 2024-04-01 NOTE — PROGRESS NOTES
4/1/2024 10:43 PM  Location:[facility] PHYSICIAN SERVICES  [facility] [place] MEDICINE  [place]  Patient #:  [id number]  YOB: 1945            History of Present Illness     Chief Complaint   Patient presents with    Follow-up     6 week follow up. Pt is not checking her BP at home and states her depression has not improved. She reports for the past 3 days she has been taking Lexapro 5 mg and Pristiq 25 mg daily.        Ms. Briseno is a 78 y.o. female  who presents for the above.   Bowels are moving now but the patient notes inability to tell any difference with the Pristiq.             Allergies   Allergen Reactions    Penicillins Other (See Comments)     unknown     Past Medical History:   Diagnosis Date    Abdominal pain, right upper quadrant     Abnormal weight gain     Acute sinusitis, unspecified     LALY (acute kidney injury) ([facility]) 10/22/2023    Benign neoplasm of colon 2/25/2015    Benign paroxysmal positional vertigo 2/25/2015    Breast cancer ([facility]) 05/2017    left    Calculus of gallbladder without mention of cholecystitis or obstruction 2/25/2015    Depressive disorder, not elsewhere classified 2/25/2015    Dysuria     Essential hypertension, benign 2/25/2015    Insomnia, unspecified 2/25/2015    Nausea alone     Neoplasm of uncertain behavior of skin     Obesity 05/28/2019    Other malaise and fatigue     Primary localized osteoarthrosis, unspecified site 2/25/2015    Radiation therapy complication     Seizure ([facility]) 10/31/2023    Urinary tract infection, site not specified     Vaginitis and vulvovaginitis, unspecified 2/25/2015     Social History     Socioeconomic History    Marital status:      Spouse name: None    Number of children: None    Years of education: None    Highest education level: None   Tobacco Use    Smoking status: Never     Passive exposure: Never    Smokeless tobacco: Never   Vaping Use    Vaping Use: Never used   Substance and Sexual Activity    Alcohol use: No

## 2024-04-02 ASSESSMENT — ENCOUNTER SYMPTOMS: CONSTIPATION: 0

## 2024-04-10 RX ORDER — ESCITALOPRAM OXALATE 10 MG/1
TABLET ORAL
Qty: 90 TABLET | Refills: 3 | OUTPATIENT
Start: 2024-04-10

## 2024-04-17 DIAGNOSIS — F41.0 PANIC DISORDER: ICD-10-CM

## 2024-04-17 DIAGNOSIS — F33.9 RECURRENT DEPRESSION (HCC): ICD-10-CM

## 2024-04-20 DIAGNOSIS — I10 PRIMARY HYPERTENSION: ICD-10-CM

## 2024-04-22 RX ORDER — AMLODIPINE BESYLATE 5 MG/1
5 TABLET ORAL DAILY
Qty: 90 TABLET | Refills: 3 | Status: SHIPPED | OUTPATIENT
Start: 2024-04-22

## 2024-04-22 RX ORDER — DESVENLAFAXINE SUCCINATE 50 MG/1
50 TABLET, EXTENDED RELEASE ORAL DAILY
Qty: 90 TABLET | Refills: 3 | Status: SHIPPED | OUTPATIENT
Start: 2024-04-22

## 2024-05-02 ENCOUNTER — OFFICE VISIT (OUTPATIENT)
Dept: INTERNAL MEDICINE CLINIC | Facility: CLINIC | Age: 79
End: 2024-05-02
Payer: MEDICARE

## 2024-05-02 VITALS
DIASTOLIC BLOOD PRESSURE: 81 MMHG | RESPIRATION RATE: 16 BRPM | WEIGHT: 129 LBS | HEART RATE: 105 BPM | HEIGHT: 62 IN | BODY MASS INDEX: 23.74 KG/M2 | SYSTOLIC BLOOD PRESSURE: 130 MMHG

## 2024-05-02 DIAGNOSIS — I10 ESSENTIAL HYPERTENSION, BENIGN: ICD-10-CM

## 2024-05-02 DIAGNOSIS — I10 PRIMARY HYPERTENSION: Primary | ICD-10-CM

## 2024-05-02 DIAGNOSIS — K59.00 CONSTIPATION, UNSPECIFIED CONSTIPATION TYPE: ICD-10-CM

## 2024-05-02 DIAGNOSIS — F33.9 RECURRENT DEPRESSION (HCC): ICD-10-CM

## 2024-05-02 DIAGNOSIS — F41.9 ANXIETY: ICD-10-CM

## 2024-05-02 DIAGNOSIS — R56.9 SEIZURE (HCC): ICD-10-CM

## 2024-05-02 PROBLEM — E66.9 OBESITY: Status: RESOLVED | Noted: 2019-05-28 | Resolved: 2024-05-02

## 2024-05-02 PROCEDURE — 3075F SYST BP GE 130 - 139MM HG: CPT | Performed by: INTERNAL MEDICINE

## 2024-05-02 PROCEDURE — 1090F PRES/ABSN URINE INCON ASSESS: CPT | Performed by: INTERNAL MEDICINE

## 2024-05-02 PROCEDURE — G8399 PT W/DXA RESULTS DOCUMENT: HCPCS | Performed by: INTERNAL MEDICINE

## 2024-05-02 PROCEDURE — G8427 DOCREV CUR MEDS BY ELIG CLIN: HCPCS | Performed by: INTERNAL MEDICINE

## 2024-05-02 PROCEDURE — 99214 OFFICE O/P EST MOD 30 MIN: CPT | Performed by: INTERNAL MEDICINE

## 2024-05-02 PROCEDURE — 3079F DIAST BP 80-89 MM HG: CPT | Performed by: INTERNAL MEDICINE

## 2024-05-02 PROCEDURE — 1036F TOBACCO NON-USER: CPT | Performed by: INTERNAL MEDICINE

## 2024-05-02 PROCEDURE — 1123F ACP DISCUSS/DSCN MKR DOCD: CPT | Performed by: INTERNAL MEDICINE

## 2024-05-02 PROCEDURE — G8420 CALC BMI NORM PARAMETERS: HCPCS | Performed by: INTERNAL MEDICINE

## 2024-05-02 ASSESSMENT — ENCOUNTER SYMPTOMS
VOMITING: 0
COUGH: 0
SHORTNESS OF BREATH: 0
DIARRHEA: 0
CONSTIPATION: 0
BLOOD IN STOOL: 0
WHEEZING: 0
NAUSEA: 0

## 2024-05-02 NOTE — PROGRESS NOTES
5/2/2024 3:05 PM  Location:[facility] PHYSICIAN SERVICES  [facility] [place]ICINE  [place]  Patient #:  [id number]  YOB: 1945            History of Present Illness     Chief Complaint   Patient presents with    Anxiety    Constipation       Ms. Briseno is a 79 y.o. female  who presents for the above.   Notes that she is no longer constipated and although she is not sleeping great she is doing ok emotionally.             Allergies   Allergen Reactions    Penicillins Other (See Comments)     unknown     Past Medical History:   Diagnosis Date    Abdominal pain, right upper quadrant     Abnormal weight gain     Acute sinusitis, unspecified     LALY (acute kidney injury) ([facility]) 10/22/2023    Benign neoplasm of colon 2/25/2015    Benign paroxysmal positional vertigo 2/25/2015    Breast cancer ([facility]) 05/2017    left    Calculus of gallbladder without mention of cholecystitis or obstruction 2/25/2015    Depressive disorder, not elsewhere classified 2/25/2015    Dysuria     Essential hypertension, benign 2/25/2015    Insomnia, unspecified 2/25/2015    Nausea alone     Neoplasm of uncertain behavior of skin     Obesity 05/28/2019    Other malaise and fatigue     Primary localized osteoarthrosis, unspecified site 2/25/2015    Radiation therapy complication     Seizure ([facility]) 10/31/2023    Urinary tract infection, site not specified     Vaginitis and vulvovaginitis, unspecified 2/25/2015     Social History     Socioeconomic History    Marital status:      Spouse name: None    Number of children: None    Years of education: None    Highest education level: None   Tobacco Use    Smoking status: Never     Passive exposure: Never    Smokeless tobacco: Never   Vaping Use    Vaping Use: Never used   Substance and Sexual Activity    Alcohol use: No    Drug use: No    Sexual activity: Not Currently     Partners: Male     Social Determinants of Health     Financial Resource Strain: Low Risk  (11/16/2023)

## 2024-08-05 ENCOUNTER — OFFICE VISIT (OUTPATIENT)
Dept: INTERNAL MEDICINE CLINIC | Facility: CLINIC | Age: 79
End: 2024-08-05
Payer: MEDICARE

## 2024-08-05 VITALS
DIASTOLIC BLOOD PRESSURE: 88 MMHG | WEIGHT: 124 LBS | HEART RATE: 96 BPM | BODY MASS INDEX: 22.82 KG/M2 | RESPIRATION RATE: 16 BRPM | SYSTOLIC BLOOD PRESSURE: 141 MMHG | HEIGHT: 62 IN

## 2024-08-05 DIAGNOSIS — N18.30 STAGE 3 CHRONIC KIDNEY DISEASE, UNSPECIFIED WHETHER STAGE 3A OR 3B CKD (HCC): ICD-10-CM

## 2024-08-05 DIAGNOSIS — F33.9 RECURRENT MAJOR DEPRESSION RESISTANT TO TREATMENT (HCC): Primary | ICD-10-CM

## 2024-08-05 DIAGNOSIS — K59.00 CONSTIPATION, UNSPECIFIED CONSTIPATION TYPE: ICD-10-CM

## 2024-08-05 PROBLEM — J96.01 ACUTE HYPOXEMIC RESPIRATORY FAILURE (HCC): Status: RESOLVED | Noted: 2023-10-22 | Resolved: 2024-08-05

## 2024-08-05 PROBLEM — T50.901A: Status: RESOLVED | Noted: 2023-10-22 | Resolved: 2024-08-05

## 2024-08-05 PROBLEM — T50.902A INTENTIONAL DRUG OVERDOSE (HCC): Status: RESOLVED | Noted: 2023-10-23 | Resolved: 2024-08-05

## 2024-08-05 PROBLEM — R56.9 SEIZURE (HCC): Status: RESOLVED | Noted: 2023-10-31 | Resolved: 2024-08-05

## 2024-08-05 PROCEDURE — 99214 OFFICE O/P EST MOD 30 MIN: CPT | Performed by: INTERNAL MEDICINE

## 2024-08-05 PROCEDURE — G8427 DOCREV CUR MEDS BY ELIG CLIN: HCPCS | Performed by: INTERNAL MEDICINE

## 2024-08-05 PROCEDURE — 3079F DIAST BP 80-89 MM HG: CPT | Performed by: INTERNAL MEDICINE

## 2024-08-05 PROCEDURE — 3077F SYST BP >= 140 MM HG: CPT | Performed by: INTERNAL MEDICINE

## 2024-08-05 PROCEDURE — 1123F ACP DISCUSS/DSCN MKR DOCD: CPT | Performed by: INTERNAL MEDICINE

## 2024-08-05 PROCEDURE — 1090F PRES/ABSN URINE INCON ASSESS: CPT | Performed by: INTERNAL MEDICINE

## 2024-08-05 PROCEDURE — 1036F TOBACCO NON-USER: CPT | Performed by: INTERNAL MEDICINE

## 2024-08-05 PROCEDURE — G8399 PT W/DXA RESULTS DOCUMENT: HCPCS | Performed by: INTERNAL MEDICINE

## 2024-08-05 PROCEDURE — G8420 CALC BMI NORM PARAMETERS: HCPCS | Performed by: INTERNAL MEDICINE

## 2024-08-05 RX ORDER — PLECANATIDE 3 MG/1
3 TABLET ORAL DAILY
Qty: 30 TABLET | Refills: 5 | Status: SHIPPED | OUTPATIENT
Start: 2024-08-05

## 2024-08-05 ASSESSMENT — ENCOUNTER SYMPTOMS
BLOOD IN STOOL: 0
COUGH: 0
SHORTNESS OF BREATH: 0
WHEEZING: 0
VOMITING: 0
NAUSEA: 0
DIARRHEA: 1
CONSTIPATION: 1

## 2024-08-05 NOTE — PROGRESS NOTES
8/5/2024 6:31 PM  Location:[facility] PHYSICIAN SERVICES  [facility] [place]INE  [place]  Patient #:  [id number]  YOB: 1945            History of Present Illness     Chief Complaint   Patient presents with    Follow-up    Diarrhea     Complains with loose stool.     Depression     States she is very depressed - is off her medications.       Ms. Briseno is a 79 y.o. female  who presents for the above.   Stopped all her medications.  Felt good for one day on Pristiq and then did not have an effect.  Notes that she has been through this so many times that she just stopped everything.  Has fecal leakage at night.  Does not have regular, adequate bowel movements.  GI doc recommended linzess in the past.   is with her and is very attentive.           Allergies   Allergen Reactions    Penicillins Swelling     unknown     Past Medical History:   Diagnosis Date    Abdominal pain, right upper quadrant     Abnormal weight gain     Acute sinusitis, unspecified     LALY (acute kidney injury) ([facility]) 10/22/2023    Benign neoplasm of colon 2/25/2015    Benign paroxysmal positional vertigo 2/25/2015    Breast cancer ([facility]) 05/2017    left    Calculus of gallbladder without mention of cholecystitis or obstruction 2/25/2015    Depressive disorder, not elsewhere classified 2/25/2015    Dysuria     Essential hypertension, benign 2/25/2015    Insomnia, unspecified 2/25/2015    Nausea alone     Neoplasm of uncertain behavior of skin     Obesity 05/28/2019    Other malaise and fatigue     Polysubstance overdose, accidental or unintentional, initial encounter 10/22/2023    Primary localized osteoarthrosis, unspecified site 2/25/2015    Radiation therapy complication     Seizure ([facility]) 10/31/2023    Urinary tract infection, site not specified     Vaginitis and vulvovaginitis, unspecified 2/25/2015     Social History     Socioeconomic History    Marital status:      Spouse name: None    Number of children: None

## 2024-09-17 ENCOUNTER — TELEPHONE (OUTPATIENT)
Dept: INTERNAL MEDICINE CLINIC | Facility: CLINIC | Age: 79
End: 2024-09-17

## 2024-11-13 ENCOUNTER — TELEPHONE (OUTPATIENT)
Dept: INTERNAL MEDICINE CLINIC | Facility: CLINIC | Age: 79
End: 2024-11-13

## 2024-11-13 NOTE — TELEPHONE ENCOUNTER
Elisa VM asking pt to return my call. I need to find out if pt returned her monitor.    Pt's  was in for lab work today.   He wanted to mention that pt continues to have loose stools.  She has diarrhea several times daily and is afraid to leave her house because of this. He has noticed this is worse or more frequent when she is anxious.   Is there anything they can do for this?

## 2024-11-13 NOTE — TELEPHONE ENCOUNTER
I recommend getting back in to see Dr. Alexander.  Happy to refer her back there.  Thanks.  [facility]

## 2024-11-14 NOTE — TELEPHONE ENCOUNTER
May I come see you in your home 11/19/24?  Maybe around 5:30?  If so, go ahead and put her on my schedule as a home visit. Thanks.  NOEMI

## 2024-11-14 NOTE — TELEPHONE ENCOUNTER
states she is not comfortable leaving the house at this time due to the diarrhea. She is afraid she will have an accident while out.   Pt wanted to wait on the referral at this time.

## 2025-04-02 ENCOUNTER — TELEPHONE (OUTPATIENT)
Dept: INTERNAL MEDICINE CLINIC | Facility: CLINIC | Age: 80
End: 2025-04-02

## 2025-04-02 NOTE — TELEPHONE ENCOUNTER
I have talked with Ms. Briseno to schedule her AWV. She has stated that there is just not a good time right now to schedule this for her.

## 2025-04-11 DIAGNOSIS — I10 PRIMARY HYPERTENSION: ICD-10-CM

## 2025-04-11 RX ORDER — AMLODIPINE BESYLATE 5 MG/1
5 TABLET ORAL DAILY
Qty: 90 TABLET | Refills: 0 | Status: SHIPPED | OUTPATIENT
Start: 2025-04-11

## 2025-05-22 ENCOUNTER — OFFICE VISIT (OUTPATIENT)
Dept: INTERNAL MEDICINE CLINIC | Facility: CLINIC | Age: 80
End: 2025-05-22
Payer: MEDICARE

## 2025-05-22 VITALS — SYSTOLIC BLOOD PRESSURE: 142 MMHG | DIASTOLIC BLOOD PRESSURE: 82 MMHG | HEART RATE: 72 BPM

## 2025-05-22 DIAGNOSIS — F99 INSOMNIA DUE TO OTHER MENTAL DISORDER: ICD-10-CM

## 2025-05-22 DIAGNOSIS — F51.05 INSOMNIA DUE TO OTHER MENTAL DISORDER: ICD-10-CM

## 2025-05-22 DIAGNOSIS — R63.6 UNDERWEIGHT: ICD-10-CM

## 2025-05-22 DIAGNOSIS — F41.9 ANXIETY: ICD-10-CM

## 2025-05-22 DIAGNOSIS — R62.7 FAILURE TO THRIVE IN ADULT: ICD-10-CM

## 2025-05-22 DIAGNOSIS — K52.9 CHRONIC DIARRHEA: ICD-10-CM

## 2025-05-22 DIAGNOSIS — F40.01 AGORAPHOBIA WITH PANIC ATTACKS: ICD-10-CM

## 2025-05-22 DIAGNOSIS — I10 ESSENTIAL HYPERTENSION, BENIGN: Primary | ICD-10-CM

## 2025-05-22 DIAGNOSIS — F33.9 RECURRENT MAJOR DEPRESSION RESISTANT TO TREATMENT: ICD-10-CM

## 2025-05-22 PROCEDURE — 99349 HOME/RES VST EST MOD MDM 40: CPT | Performed by: INTERNAL MEDICINE

## 2025-05-22 PROCEDURE — 1036F TOBACCO NON-USER: CPT | Performed by: INTERNAL MEDICINE

## 2025-05-22 PROCEDURE — 3079F DIAST BP 80-89 MM HG: CPT | Performed by: INTERNAL MEDICINE

## 2025-05-22 PROCEDURE — 3077F SYST BP >= 140 MM HG: CPT | Performed by: INTERNAL MEDICINE

## 2025-05-22 PROCEDURE — G8420 CALC BMI NORM PARAMETERS: HCPCS | Performed by: INTERNAL MEDICINE

## 2025-05-22 PROCEDURE — 1123F ACP DISCUSS/DSCN MKR DOCD: CPT | Performed by: INTERNAL MEDICINE

## 2025-05-22 PROCEDURE — 1090F PRES/ABSN URINE INCON ASSESS: CPT | Performed by: INTERNAL MEDICINE

## 2025-05-22 RX ORDER — ESCITALOPRAM OXALATE 10 MG/1
10 TABLET ORAL DAILY
Qty: 30 TABLET | Refills: 5 | Status: SHIPPED | OUTPATIENT
Start: 2025-05-22

## 2025-05-22 RX ORDER — LORAZEPAM 1 MG/1
1 TABLET ORAL EVERY 12 HOURS PRN
Qty: 60 TABLET | Refills: 5 | Status: SHIPPED | OUTPATIENT
Start: 2025-05-22 | End: 2025-11-18

## 2025-05-22 RX ORDER — LOPERAMIDE HYDROCHLORIDE 2 MG/1
2 TABLET ORAL 4 TIMES DAILY PRN
COMMUNITY
Start: 2025-05-22 | End: 2025-06-01

## 2025-05-22 ASSESSMENT — ENCOUNTER SYMPTOMS
DIARRHEA: 1
BLOOD IN STOOL: 0
VOMITING: 0
CONSTIPATION: 0
ABDOMINAL PAIN: 0
NAUSEA: 1
SHORTNESS OF BREATH: 0

## 2025-05-22 NOTE — PROGRESS NOTES
5/22/2025 4:57 PM  Location:[facility] PHYSICIAN SERVICES  [facility] MEDICINE  [place]  Patient #:  [id number]  YOB: 1945            History of Present Illness     Chief Complaint   Patient presents with    Follow-up    Diarrhea    Depression       Ms. Briseno is a 80 y.o. female  who presents for the above.   Patient allowed me to see her in her home.  Is not on any depression medications and has a long standing history of clinical depression.  Has loose bowel movements and fecal incontinence throughout the day.  This has been going on for months.  Essentially is living off of ensure and milk.      Diarrhea   This is a chronic problem. The current episode started more than 1 month ago. The problem occurs more than 10 times per day. The problem has been gradually worsening. Pertinent negatives include no abdominal pain, chills, fever or vomiting.   DepressionPatient presents with the following symptoms: nervousness/anxiety.  Patient is not experiencing: shortness of breath and suicidal ideas.             Allergies   Allergen Reactions    Penicillins Swelling     unknown     Past Medical History:   Diagnosis Date    Abdominal pain, right upper quadrant     Abnormal weight gain     Acute sinusitis, unspecified     LALY (acute kidney injury) 10/22/2023    Benign neoplasm of colon 2/25/2015    Benign paroxysmal positional vertigo 2/25/2015    Breast cancer ([facility]) 05/2017    left    Calculus of gallbladder without mention of cholecystitis or obstruction 2/25/2015    Depressive disorder, not elsewhere classified 2/25/2015    Dysuria     Essential hypertension, benign 2/25/2015    Insomnia, unspecified 2/25/2015    Nausea alone     Neoplasm of uncertain behavior of skin     Obesity 05/28/2019    Other malaise and fatigue     Polysubstance overdose, accidental or unintentional, initial encounter 10/22/2023    Primary localized osteoarthrosis, unspecified site 2/25/2015    Radiation therapy

## 2025-05-29 ENCOUNTER — TELEPHONE (OUTPATIENT)
Dept: INTERNAL MEDICINE CLINIC | Facility: CLINIC | Age: 80
End: 2025-05-29

## 2025-05-29 NOTE — TELEPHONE ENCOUNTER
Please call and determine if her diarrhea is better.  Are you able to advance your diet at all?  Sleeping any better?  Ask if she is taking the lexapro and imodium and lorazepam. Thanks.  [facility]

## 2025-05-29 NOTE — TELEPHONE ENCOUNTER
Spoke with pt - she is not doing any better.   She still has diarrhea - still not sleeping well and her appetite has not improved.  She is taking her medications as prescribed.

## 2025-06-05 ENCOUNTER — TELEPHONE (OUTPATIENT)
Dept: INTERNAL MEDICINE CLINIC | Facility: CLINIC | Age: 80
End: 2025-06-05

## 2025-06-05 NOTE — TELEPHONE ENCOUNTER
Spoke with pt - she still has some diarrhea and no appetite -no change, still the same.   she states home health is not coming out to her house - she refused services.

## 2025-06-05 NOTE — TELEPHONE ENCOUNTER
Is your diarrhea any better?  Appetite improving at all?  Has home health been able to assist you in any way?

## 2025-06-16 RX ORDER — ESCITALOPRAM OXALATE 10 MG/1
10 TABLET ORAL DAILY
Qty: 90 TABLET | Refills: 2 | Status: SHIPPED | OUTPATIENT
Start: 2025-06-16

## 2025-06-16 NOTE — TELEPHONE ENCOUNTER
Pt is scheduled for a home visit on Thursday 7/19/2025.  Dr Gu wanted to change that to tomorrow 6/17/2025 after work around 5:30    No answer.

## 2025-06-18 ENCOUNTER — TELEPHONE (OUTPATIENT)
Dept: INTERNAL MEDICINE CLINIC | Facility: CLINIC | Age: 80
End: 2025-06-18

## 2025-06-18 NOTE — TELEPHONE ENCOUNTER
[place]. Trejo [place] been [place]. Her mom had refused home health. Natalie , daughter has called and wants   a new referral put in for Home Health . Her mom cancelled the last one and refused. Natalie states that her and her dad wants to be called and not Ms. Briseno because she does refuse to be seen.   Please have hh call Natalie at [phone]. Natalie also states that she will be at the house tomorrow when Dr. Gu comes to see Ms. Briseno.

## 2025-06-19 ENCOUNTER — TELEPHONE (OUTPATIENT)
Dept: INTERNAL MEDICINE CLINIC | Facility: CLINIC | Age: 80
End: 2025-06-19

## 2025-06-19 ENCOUNTER — OFFICE VISIT (OUTPATIENT)
Dept: INTERNAL MEDICINE CLINIC | Facility: CLINIC | Age: 80
End: 2025-06-19

## 2025-06-19 VITALS — SYSTOLIC BLOOD PRESSURE: 138 MMHG | DIASTOLIC BLOOD PRESSURE: 78 MMHG | HEART RATE: 82 BPM

## 2025-06-19 DIAGNOSIS — F40.01 AGORAPHOBIA WITH PANIC ATTACKS: ICD-10-CM

## 2025-06-19 DIAGNOSIS — F51.05 INSOMNIA DUE TO OTHER MENTAL DISORDER: Primary | ICD-10-CM

## 2025-06-19 DIAGNOSIS — I10 ESSENTIAL HYPERTENSION, BENIGN: ICD-10-CM

## 2025-06-19 DIAGNOSIS — K52.9 CHRONIC DIARRHEA: ICD-10-CM

## 2025-06-19 DIAGNOSIS — F41.9 ANXIETY: ICD-10-CM

## 2025-06-19 DIAGNOSIS — F99 INSOMNIA DUE TO OTHER MENTAL DISORDER: Primary | ICD-10-CM

## 2025-06-19 RX ORDER — LORAZEPAM 0.5 MG/1
0.5 TABLET ORAL EVERY 12 HOURS PRN
Qty: 60 TABLET | Refills: 5 | Status: SHIPPED | OUTPATIENT
Start: 2025-06-19 | End: 2025-12-16

## 2025-06-19 RX ORDER — QUETIAPINE FUMARATE 50 MG/1
50 TABLET, FILM COATED ORAL
Qty: 90 TABLET | Refills: 1 | Status: SHIPPED | OUTPATIENT
Start: 2025-06-19

## 2025-06-19 ASSESSMENT — ENCOUNTER SYMPTOMS
ABDOMINAL PAIN: 0
CONSTIPATION: 0
VOMITING: 0
SHORTNESS OF BREATH: 0
DIARRHEA: 1
NAUSEA: 1
BLOOD IN STOOL: 0

## 2025-06-19 NOTE — TELEPHONE ENCOUNTER
Please call and let patient know that I sent in quetiapine for her to take at night to rest as well as lorazepam to take for panic.  Please put stool cup and hat as well as gloves up front for her  to  for her to collect stool sample.  I'll plan to do a home visit 7/30 at 2:00 if that will work for them.  Please schedule.

## 2025-06-19 NOTE — PROGRESS NOTES
(sleep) for up to 180 days. Max Daily Amount: 1 mg 60 tablet 5    escitalopram (LEXAPRO) 10 MG tablet Take 1 tablet by mouth daily 1/2 tab daily 5-6 days 90 tablet 2    amLODIPine (NORVASC) 5 MG tablet TAKE 1 TABLET BY MOUTH EVERY DAY 90 tablet 0     No current facility-administered medications for this visit.       Orders Placed This Encounter   Procedures    Culture, Stool     Standing Status:   Future     Expected Date:   6/19/2025     Expiration Date:   6/19/2026    Giardia antigen     Standing Status:   Future     Expected Date:   6/19/2025     Expiration Date:   6/19/2026    C. difficile toxin Molecular     Standing Status:   Future     Expected Date:   6/19/2025     Expiration Date:   6/19/2026    Calprotectin Stool     Standing Status:   Future     Expected Date:   6/19/2025     Expiration Date:   6/19/2026       Orders Placed This Encounter   Medications    QUEtiapine (SEROQUEL) 50 MG tablet     Sig: Take 1 tablet by mouth nightly     Dispense:  90 tablet     Refill:  1    LORazepam (ATIVAN) 0.5 MG tablet     Sig: Take 1 tablet by mouth every 12 hours as needed for Anxiety (sleep) for up to 180 days. Max Daily Amount: 1 mg     Dispense:  60 tablet     Refill:  5       Medications Discontinued During This Encounter   Medication Reason    LORazepam (ATIVAN) 1 MG tablet REORDER        Diagnosis Orders   1. Insomnia due to other mental disorder        2. Essential hypertension, benign        3. Agoraphobia with panic attacks        4. Anxiety  LORazepam (ATIVAN) 0.5 MG tablet      5. Chronic diarrhea  Culture, Stool    Giardia antigen    C. difficile toxin Molecular    Calprotectin Stool        Vitals look good outside of weight.  Will discuss labs over the phone.    Try the above.  Will check on patient via [employer] in one week to ensure improving.  Knows to keep a low threshold for contacting the office with worsening symptoms. This patient has been seen today and has medical problems which are chronic, and

## 2025-07-12 DIAGNOSIS — I10 PRIMARY HYPERTENSION: ICD-10-CM

## 2025-07-14 RX ORDER — AMLODIPINE BESYLATE 5 MG/1
5 TABLET ORAL DAILY
Qty: 90 TABLET | Refills: 3 | Status: SHIPPED | OUTPATIENT
Start: 2025-07-14

## 2025-07-31 ENCOUNTER — TELEPHONE (OUTPATIENT)
Dept: INTERNAL MEDICINE CLINIC | Facility: CLINIC | Age: 80
End: 2025-07-31

## 2025-07-31 NOTE — TELEPHONE ENCOUNTER
Please reschedule pt's appointment for today -- see below from Dr Gu.     I'd like to switch her visit to 8/14 if that will work for them? have them  hat for her to collect her stool before then  ( per Dr Gu)

## 2025-08-14 ENCOUNTER — PATIENT MESSAGE (OUTPATIENT)
Dept: INTERNAL MEDICINE CLINIC | Facility: CLINIC | Age: 80
End: 2025-08-14

## 2025-08-14 ENCOUNTER — OFFICE VISIT (OUTPATIENT)
Dept: INTERNAL MEDICINE CLINIC | Facility: CLINIC | Age: 80
End: 2025-08-14

## 2025-08-14 ENCOUNTER — TELEPHONE (OUTPATIENT)
Dept: INTERNAL MEDICINE CLINIC | Facility: CLINIC | Age: 80
End: 2025-08-14

## 2025-08-14 VITALS — SYSTOLIC BLOOD PRESSURE: 146 MMHG | DIASTOLIC BLOOD PRESSURE: 80 MMHG

## 2025-08-14 DIAGNOSIS — F40.01 AGORAPHOBIA WITH PANIC ATTACKS: ICD-10-CM

## 2025-08-14 DIAGNOSIS — R63.6 UNDERWEIGHT: ICD-10-CM

## 2025-08-14 DIAGNOSIS — K52.9 CHRONIC DIARRHEA: ICD-10-CM

## 2025-08-14 DIAGNOSIS — I10 ESSENTIAL HYPERTENSION, BENIGN: ICD-10-CM

## 2025-08-14 DIAGNOSIS — F33.9 RECURRENT MAJOR DEPRESSION RESISTANT TO TREATMENT: Primary | ICD-10-CM

## 2025-08-14 DIAGNOSIS — R62.7 FAILURE TO THRIVE IN ADULT: ICD-10-CM

## 2025-08-14 RX ORDER — DIPHENOXYLATE HYDROCHLORIDE AND ATROPINE SULFATE 2.5; .025 MG/1; MG/1
1 TABLET ORAL 4 TIMES DAILY PRN
Qty: 30 TABLET | Refills: 1 | Status: SHIPPED | OUTPATIENT
Start: 2025-08-14 | End: 2025-08-29

## 2025-08-14 ASSESSMENT — ENCOUNTER SYMPTOMS
DIARRHEA: 1
BLOOD IN STOOL: 0

## 2025-08-18 ENCOUNTER — TELEPHONE (OUTPATIENT)
Dept: INTERNAL MEDICINE CLINIC | Facility: CLINIC | Age: 80
End: 2025-08-18

## 2025-08-25 ENCOUNTER — PATIENT MESSAGE (OUTPATIENT)
Dept: INTERNAL MEDICINE CLINIC | Facility: CLINIC | Age: 80
End: 2025-08-25

## (undated) DEVICE — SUTURE VCRL SZ 3-0 L27IN ABSRB UD L26MM SH 1/2 CIR J416H

## (undated) DEVICE — NEEDLE HYPO 21GA L1.5IN INTRAMUSCULAR S STL LATCH BVL UP

## (undated) DEVICE — SUT SLK 2-0SH 30IN BLK --

## (undated) DEVICE — CONTAINER,SPECIMEN,O.R.STRL,4.5OZ: Brand: MEDLINE

## (undated) DEVICE — (D)PREP SKN CHLRAPRP APPL 26ML -- CONVERT TO ITEM 371833

## (undated) DEVICE — (D)SYR 10ML 1/5ML GRAD NSAF -- PKGING CHANGE USE ITEM 338027

## (undated) DEVICE — CLIP LIG ADH PD HORZ MED BLU --

## (undated) DEVICE — (D)STRIP SKN CLSR 0.5X4IN WHT --

## (undated) DEVICE — REM POLYHESIVE ADULT PATIENT RETURN ELECTRODE: Brand: VALLEYLAB

## (undated) DEVICE — SUTURE VCRL SZ 3-0 L18IN ABSRB UD W/O NDL POLYGLACTIN 910 J110T

## (undated) DEVICE — BUTTON SWITCH PENCIL BLADE ELECTRODE, HOLSTER: Brand: EDGE

## (undated) DEVICE — MEDI-VAC YANKAUER SUCTION HANDLE W/BULBOUS TIP: Brand: CARDINAL HEALTH

## (undated) DEVICE — CONTAINER COLL/TRNSPRT BX BRST -- E-Z-EM CAT 8390

## (undated) DEVICE — KENDALL SCD EXPRESS SLEEVES, KNEE LENGTH, MEDIUM: Brand: KENDALL SCD

## (undated) DEVICE — SURGICAL PROCEDURE PACK BASIC ST FRANCIS

## (undated) DEVICE — 2000CC GUARDIAN II: Brand: GUARDIAN

## (undated) DEVICE — SUTURE MCRYL SZ 4-0 L27IN ABSRB UD L19MM PS-2 1/2 CIR PRIM Y426H

## (undated) DEVICE — 48" PROBE COVER W/GEL, ULTRASOUND, STERILE: Brand: SITE-RITE

## (undated) DEVICE — DRAPE,TOP,102X53,STERILE: Brand: MEDLINE

## (undated) DEVICE — SHEET, DRAPE, SPLIT, STERILE: Brand: MEDLINE

## (undated) DEVICE — SOLUTION IV 1000ML 0.9% SOD CHL

## (undated) DEVICE — COVER PRB L11.9CM TAPR L3.8X61CM TRNSPAR SFT PLIABLE

## (undated) DEVICE — MASTISOL ADHESIVE LIQ 2/3ML